# Patient Record
Sex: FEMALE | Race: WHITE | Employment: OTHER | URBAN - METROPOLITAN AREA
[De-identification: names, ages, dates, MRNs, and addresses within clinical notes are randomized per-mention and may not be internally consistent; named-entity substitution may affect disease eponyms.]

---

## 2017-02-28 PROBLEM — Z90.12 H/O TOTAL MASTECTOMY OF LEFT BREAST: Status: ACTIVE | Noted: 2017-02-28

## 2019-08-12 ENCOUNTER — HOSPITAL ENCOUNTER (OUTPATIENT)
Dept: CT IMAGING | Age: 59
Discharge: HOME | End: 2019-08-12
Payer: COMMERCIAL

## 2019-08-12 ENCOUNTER — HOSPITAL ENCOUNTER (OUTPATIENT)
Age: 59
Discharge: HOME | End: 2019-08-12
Payer: COMMERCIAL

## 2019-08-12 VITALS
DIASTOLIC BLOOD PRESSURE: 72 MMHG | OXYGEN SATURATION: 96 % | SYSTOLIC BLOOD PRESSURE: 156 MMHG | RESPIRATION RATE: 20 BRPM | HEART RATE: 70 BPM

## 2019-08-12 DIAGNOSIS — R29.91 MUSCULOSKELETAL ABNORMAL FINDING ON EXAMINATION: ICD-10-CM

## 2019-08-12 DIAGNOSIS — C50.012 CARCINOMA OF NIPPLE AND AREOLA OF FEMALE BREAST, LEFT (HCC): ICD-10-CM

## 2019-08-12 LAB
INR BLD: 0.98 (ref 0.86–1.14)
PLATELET # BLD: 154 K/UL (ref 135–450)
PROTHROMBIN TIME: 11.2 SEC (ref 9.8–13)

## 2019-08-12 PROCEDURE — 88342 IMHCHEM/IMCYTCHM 1ST ANTB: CPT

## 2019-08-12 PROCEDURE — 88311 DECALCIFY TISSUE: CPT

## 2019-08-12 PROCEDURE — 85049 AUTOMATED PLATELET COUNT: CPT

## 2019-08-12 PROCEDURE — 6360000002 HC RX W HCPCS: Performed by: RADIOLOGY

## 2019-08-12 PROCEDURE — 2709999900 CT BIOPSY DEEP BONE PERCUTANEOUS

## 2019-08-12 PROCEDURE — 88307 TISSUE EXAM BY PATHOLOGIST: CPT

## 2019-08-12 PROCEDURE — 77012 CT SCAN FOR NEEDLE BIOPSY: CPT

## 2019-08-12 PROCEDURE — 85610 PROTHROMBIN TIME: CPT

## 2019-08-12 PROCEDURE — 88360 TUMOR IMMUNOHISTOCHEM/MANUAL: CPT

## 2019-08-12 PROCEDURE — 88341 IMHCHEM/IMCYTCHM EA ADD ANTB: CPT

## 2019-08-12 PROCEDURE — 36415 COLL VENOUS BLD VENIPUNCTURE: CPT

## 2019-08-12 RX ORDER — MIDAZOLAM HYDROCHLORIDE 5 MG/ML
INJECTION INTRAMUSCULAR; INTRAVENOUS
Status: COMPLETED | OUTPATIENT
Start: 2019-08-12 | End: 2019-08-12

## 2019-08-12 RX ORDER — FENTANYL CITRATE 50 UG/ML
INJECTION, SOLUTION INTRAMUSCULAR; INTRAVENOUS
Status: COMPLETED | OUTPATIENT
Start: 2019-08-12 | End: 2019-08-12

## 2019-08-12 RX ADMIN — MIDAZOLAM HYDROCHLORIDE 1 MG: 5 INJECTION, SOLUTION INTRAMUSCULAR; INTRAVENOUS at 09:15

## 2019-08-12 RX ADMIN — FENTANYL CITRATE 50 MCG: 50 INJECTION INTRAMUSCULAR; INTRAVENOUS at 09:15

## 2019-08-12 RX ADMIN — MIDAZOLAM HYDROCHLORIDE 1 MG: 5 INJECTION, SOLUTION INTRAMUSCULAR; INTRAVENOUS at 09:28

## 2022-01-31 ENCOUNTER — HOSPITAL ENCOUNTER (OUTPATIENT)
Dept: CT IMAGING | Age: 62
Discharge: HOME | End: 2022-01-31
Payer: COMMERCIAL

## 2022-01-31 DIAGNOSIS — C50.012 MALIGNANT NEOPLASM OF NIPPLE OF LEFT BREAST IN FEMALE, UNSPECIFIED ESTROGEN RECEPTOR STATUS (HCC): ICD-10-CM

## 2022-01-31 PROCEDURE — 74177 CT ABD & PELVIS W/CONTRAST: CPT

## 2022-01-31 PROCEDURE — 6360000004 HC RX CONTRAST MEDICATION: Performed by: NURSE PRACTITIONER

## 2022-01-31 RX ADMIN — IOPAMIDOL 75 ML: 755 INJECTION, SOLUTION INTRAVENOUS at 12:08

## 2022-01-31 RX ADMIN — IOHEXOL 50 ML: 240 INJECTION, SOLUTION INTRATHECAL; INTRAVASCULAR; INTRAVENOUS; ORAL at 12:23

## 2022-05-23 ENCOUNTER — PRE-ADMISSION TESTING (OUTPATIENT)
Dept: PREADMISSION TESTING | Facility: HOSPITAL | Age: 62
End: 2022-05-23

## 2022-05-23 ENCOUNTER — HOSPITAL ENCOUNTER (OUTPATIENT)
Dept: GENERAL RADIOLOGY | Facility: HOSPITAL | Age: 62
Discharge: HOME | End: 2022-05-23

## 2022-05-23 VITALS — BODY MASS INDEX: 29.17 KG/M2 | WEIGHT: 185.85 LBS | HEIGHT: 67 IN

## 2022-05-23 LAB
ANION GAP SERPL CALCULATED.3IONS-SCNC: 11 MMOL/L (ref 5–15)
BUN SERPL-MCNC: 14 MG/DL (ref 8–23)
BUN/CREAT SERPL: 13.5 (ref 7–25)
CALCIUM SPEC-SCNC: 8.5 MG/DL (ref 8.6–10.5)
CHLORIDE SERPL-SCNC: 104 MMOL/L (ref 98–107)
CO2 SERPL-SCNC: 23 MMOL/L (ref 22–29)
CREAT SERPL-MCNC: 1.04 MG/DL (ref 0.57–1)
DEPRECATED RDW RBC AUTO: 49.7 FL (ref 37–54)
EGFRCR SERPLBLD CKD-EPI 2021: 61.7 ML/MIN/1.73
ERYTHROCYTE [DISTWIDTH] IN BLOOD BY AUTOMATED COUNT: 13 % (ref 12.3–15.4)
GLUCOSE SERPL-MCNC: 102 MG/DL (ref 65–99)
HBA1C MFR BLD: 4.5 % (ref 4.8–5.6)
HCT VFR BLD AUTO: 31 % (ref 34–46.6)
HGB BLD-MCNC: 11.1 G/DL (ref 12–15.9)
MCH RBC QN AUTO: 38.1 PG (ref 26.6–33)
MCHC RBC AUTO-ENTMCNC: 35.8 G/DL (ref 31.5–35.7)
MCV RBC AUTO: 106.5 FL (ref 79–97)
PLATELET # BLD AUTO: 134 10*3/MM3 (ref 140–450)
PMV BLD AUTO: 10 FL (ref 6–12)
POTASSIUM SERPL-SCNC: 4.2 MMOL/L (ref 3.5–5.2)
RBC # BLD AUTO: 2.91 10*6/MM3 (ref 3.77–5.28)
SARS-COV-2 RNA PNL SPEC NAA+PROBE: NOT DETECTED
SODIUM SERPL-SCNC: 138 MMOL/L (ref 136–145)
WBC NRBC COR # BLD: 2.4 10*3/MM3 (ref 3.4–10.8)

## 2022-05-23 PROCEDURE — 85027 COMPLETE CBC AUTOMATED: CPT

## 2022-05-23 PROCEDURE — U0004 COV-19 TEST NON-CDC HGH THRU: HCPCS

## 2022-05-23 PROCEDURE — C9803 HOPD COVID-19 SPEC COLLECT: HCPCS

## 2022-05-23 PROCEDURE — 71046 X-RAY EXAM CHEST 2 VIEWS: CPT

## 2022-05-23 PROCEDURE — 80048 BASIC METABOLIC PNL TOTAL CA: CPT

## 2022-05-23 PROCEDURE — 83036 HEMOGLOBIN GLYCOSYLATED A1C: CPT

## 2022-05-23 PROCEDURE — 36415 COLL VENOUS BLD VENIPUNCTURE: CPT

## 2022-05-23 RX ORDER — CITALOPRAM 40 MG/1
40 TABLET ORAL DAILY
COMMUNITY

## 2022-05-23 RX ORDER — LETROZOLE 2.5 MG/1
2.5 TABLET, FILM COATED ORAL DAILY
COMMUNITY

## 2022-05-23 RX ORDER — METOPROLOL SUCCINATE 100 MG/1
100 TABLET, EXTENDED RELEASE ORAL DAILY
COMMUNITY

## 2022-05-23 RX ORDER — LOSARTAN POTASSIUM 50 MG/1
50 TABLET ORAL DAILY
COMMUNITY

## 2022-05-23 RX ORDER — CHOLECALCIFEROL (VITAMIN D3) 125 MCG
1 CAPSULE ORAL DAILY
COMMUNITY

## 2022-05-25 ENCOUNTER — ANESTHESIA EVENT (OUTPATIENT)
Dept: PERIOP | Facility: HOSPITAL | Age: 62
End: 2022-05-25

## 2022-05-25 ENCOUNTER — ANESTHESIA (OUTPATIENT)
Dept: PERIOP | Facility: HOSPITAL | Age: 62
End: 2022-05-25

## 2022-05-25 ENCOUNTER — ANESTHESIA EVENT CONVERTED (OUTPATIENT)
Dept: ANESTHESIOLOGY | Facility: HOSPITAL | Age: 62
End: 2022-05-25

## 2022-05-25 ENCOUNTER — APPOINTMENT (OUTPATIENT)
Dept: GENERAL RADIOLOGY | Facility: HOSPITAL | Age: 62
End: 2022-05-25

## 2022-05-25 ENCOUNTER — HOSPITAL ENCOUNTER (OUTPATIENT)
Facility: HOSPITAL | Age: 62
Discharge: HOME | End: 2022-05-26
Attending: ORTHOPAEDIC SURGERY | Admitting: ORTHOPAEDIC SURGERY

## 2022-05-25 DIAGNOSIS — Z85.3 HISTORY OF BREAST CANCER: ICD-10-CM

## 2022-05-25 PROBLEM — S42.402A CLOSED FRACTURE OF LEFT DISTAL HUMERUS: Status: ACTIVE | Noted: 2022-05-25

## 2022-05-25 PROCEDURE — 73070 X-RAY EXAM OF ELBOW: CPT

## 2022-05-25 PROCEDURE — 25010000002 VANCOMYCIN 1 G RECONSTITUTED SOLUTION: Performed by: ORTHOPAEDIC SURGERY

## 2022-05-25 PROCEDURE — 88311 DECALCIFY TISSUE: CPT | Performed by: ORTHOPAEDIC SURGERY

## 2022-05-25 PROCEDURE — 24363 REPLACE ELBOW JOINT: CPT | Performed by: PHYSICIAN ASSISTANT

## 2022-05-25 PROCEDURE — 88342 IMHCHEM/IMCYTCHM 1ST ANTB: CPT | Performed by: ORTHOPAEDIC SURGERY

## 2022-05-25 PROCEDURE — 88307 TISSUE EXAM BY PATHOLOGIST: CPT | Performed by: ORTHOPAEDIC SURGERY

## 2022-05-25 PROCEDURE — 25010000002 ROPIVACAINE PER 1 MG

## 2022-05-25 PROCEDURE — 25010000002 PROPOFOL 10 MG/ML EMULSION: Performed by: NURSE ANESTHETIST, CERTIFIED REGISTERED

## 2022-05-25 PROCEDURE — C1776 JOINT DEVICE (IMPLANTABLE): HCPCS | Performed by: ORTHOPAEDIC SURGERY

## 2022-05-25 PROCEDURE — 25010000002 CEFAZOLIN PER 500 MG: Performed by: ORTHOPAEDIC SURGERY

## 2022-05-25 PROCEDURE — C1713 ANCHOR/SCREW BN/BN,TIS/BN: HCPCS | Performed by: ORTHOPAEDIC SURGERY

## 2022-05-25 PROCEDURE — 25010000002 FENTANYL CITRATE (PF) 50 MCG/ML SOLUTION: Performed by: NURSE ANESTHETIST, CERTIFIED REGISTERED

## 2022-05-25 PROCEDURE — 25010000002 VANCOMYCIN 10 G RECONSTITUTED SOLUTION: Performed by: ORTHOPAEDIC SURGERY

## 2022-05-25 PROCEDURE — 25010000002 ONDANSETRON PER 1 MG: Performed by: NURSE ANESTHETIST, CERTIFIED REGISTERED

## 2022-05-25 PROCEDURE — 76942 ECHO GUIDE FOR BIOPSY: CPT | Performed by: ORTHOPAEDIC SURGERY

## 2022-05-25 PROCEDURE — 25010000002 NEOSTIGMINE 10 MG/10ML SOLUTION: Performed by: NURSE ANESTHETIST, CERTIFIED REGISTERED

## 2022-05-25 PROCEDURE — 25010000002 DEXAMETHASONE PER 1 MG: Performed by: NURSE ANESTHETIST, CERTIFIED REGISTERED

## 2022-05-25 PROCEDURE — 25010000002 FENTANYL CITRATE (PF) 50 MCG/ML SOLUTION

## 2022-05-25 PROCEDURE — 88360 TUMOR IMMUNOHISTOCHEM/MANUAL: CPT | Performed by: ORTHOPAEDIC SURGERY

## 2022-05-25 DEVICE — IMPLANTABLE DEVICE: Type: IMPLANTABLE DEVICE | Site: ELBOW | Status: FUNCTIONAL

## 2022-05-25 DEVICE — SMARTSET HIGH PERFORMANCE MV MEDIUM VISCOSITY BONE CEMENT 40G
Type: IMPLANTABLE DEVICE | Site: ELBOW | Status: FUNCTIONAL
Brand: SMARTSET

## 2022-05-25 RX ORDER — MIDAZOLAM HYDROCHLORIDE 1 MG/ML
1 INJECTION INTRAMUSCULAR; INTRAVENOUS
Status: DISCONTINUED | OUTPATIENT
Start: 2022-05-25 | End: 2022-05-25 | Stop reason: HOSPADM

## 2022-05-25 RX ORDER — ROCURONIUM BROMIDE 10 MG/ML
INJECTION, SOLUTION INTRAVENOUS AS NEEDED
Status: DISCONTINUED | OUTPATIENT
Start: 2022-05-25 | End: 2022-05-25 | Stop reason: SURG

## 2022-05-25 RX ORDER — ACETAMINOPHEN 500 MG
1000 TABLET ORAL ONCE
Status: COMPLETED | OUTPATIENT
Start: 2022-05-25 | End: 2022-05-25

## 2022-05-25 RX ORDER — FAMOTIDINE 20 MG/1
20 TABLET, FILM COATED ORAL ONCE
Status: COMPLETED | OUTPATIENT
Start: 2022-05-25 | End: 2022-05-25

## 2022-05-25 RX ORDER — ACETAMINOPHEN 500 MG
1000 TABLET ORAL ONCE
Status: DISCONTINUED | OUTPATIENT
Start: 2022-05-25 | End: 2022-05-25 | Stop reason: HOSPADM

## 2022-05-25 RX ORDER — SODIUM CHLORIDE 0.9 % (FLUSH) 0.9 %
10 SYRINGE (ML) INJECTION AS NEEDED
Status: CANCELLED | OUTPATIENT
Start: 2022-05-25

## 2022-05-25 RX ORDER — SODIUM CHLORIDE 0.9 % (FLUSH) 0.9 %
10 SYRINGE (ML) INJECTION EVERY 12 HOURS SCHEDULED
Status: CANCELLED | OUTPATIENT
Start: 2022-05-25

## 2022-05-25 RX ORDER — ROPIVACAINE HYDROCHLORIDE 2 MG/ML
6 INJECTION, SOLUTION EPIDURAL; INFILTRATION CONTINUOUS
Status: DISCONTINUED | OUTPATIENT
Start: 2022-05-25 | End: 2022-05-26

## 2022-05-25 RX ORDER — DEXAMETHASONE SODIUM PHOSPHATE 4 MG/ML
INJECTION, SOLUTION INTRA-ARTICULAR; INTRALESIONAL; INTRAMUSCULAR; INTRAVENOUS; SOFT TISSUE AS NEEDED
Status: DISCONTINUED | OUTPATIENT
Start: 2022-05-25 | End: 2022-05-25 | Stop reason: SURG

## 2022-05-25 RX ORDER — ONDANSETRON 4 MG/1
4 TABLET, FILM COATED ORAL EVERY 6 HOURS PRN
Status: DISCONTINUED | OUTPATIENT
Start: 2022-05-25 | End: 2022-05-26 | Stop reason: HOSPADM

## 2022-05-25 RX ORDER — ONDANSETRON 2 MG/ML
4 INJECTION INTRAMUSCULAR; INTRAVENOUS ONCE AS NEEDED
Status: DISCONTINUED | OUTPATIENT
Start: 2022-05-25 | End: 2022-05-25 | Stop reason: HOSPADM

## 2022-05-25 RX ORDER — TRANEXAMIC ACID 10 MG/ML
1000 INJECTION, SOLUTION INTRAVENOUS ONCE
Status: DISCONTINUED | OUTPATIENT
Start: 2022-05-25 | End: 2022-05-25 | Stop reason: HOSPADM

## 2022-05-25 RX ORDER — GLYCOPYRROLATE 0.2 MG/ML
INJECTION INTRAMUSCULAR; INTRAVENOUS AS NEEDED
Status: DISCONTINUED | OUTPATIENT
Start: 2022-05-25 | End: 2022-05-25 | Stop reason: SURG

## 2022-05-25 RX ORDER — HYDROMORPHONE HYDROCHLORIDE 1 MG/ML
0.5 INJECTION, SOLUTION INTRAMUSCULAR; INTRAVENOUS; SUBCUTANEOUS
Status: DISCONTINUED | OUTPATIENT
Start: 2022-05-25 | End: 2022-05-25 | Stop reason: HOSPADM

## 2022-05-25 RX ORDER — FENTANYL CITRATE 50 UG/ML
INJECTION, SOLUTION INTRAMUSCULAR; INTRAVENOUS
Status: COMPLETED
Start: 2022-05-25 | End: 2022-05-25

## 2022-05-25 RX ORDER — ONDANSETRON 2 MG/ML
INJECTION INTRAMUSCULAR; INTRAVENOUS AS NEEDED
Status: DISCONTINUED | OUTPATIENT
Start: 2022-05-25 | End: 2022-05-25 | Stop reason: SURG

## 2022-05-25 RX ORDER — HYDRALAZINE HYDROCHLORIDE 20 MG/ML
5 INJECTION INTRAMUSCULAR; INTRAVENOUS
Status: DISCONTINUED | OUTPATIENT
Start: 2022-05-25 | End: 2022-05-25 | Stop reason: HOSPADM

## 2022-05-25 RX ORDER — FENTANYL CITRATE 50 UG/ML
50 INJECTION, SOLUTION INTRAMUSCULAR; INTRAVENOUS
Status: DISCONTINUED | OUTPATIENT
Start: 2022-05-25 | End: 2022-05-25 | Stop reason: HOSPADM

## 2022-05-25 RX ORDER — EPHEDRINE SULFATE 50 MG/ML
INJECTION, SOLUTION INTRAVENOUS AS NEEDED
Status: DISCONTINUED | OUTPATIENT
Start: 2022-05-25 | End: 2022-05-25 | Stop reason: SURG

## 2022-05-25 RX ORDER — SODIUM CHLORIDE, SODIUM LACTATE, POTASSIUM CHLORIDE, CALCIUM CHLORIDE 600; 310; 30; 20 MG/100ML; MG/100ML; MG/100ML; MG/100ML
9 INJECTION, SOLUTION INTRAVENOUS CONTINUOUS
Status: DISCONTINUED | OUTPATIENT
Start: 2022-05-25 | End: 2022-05-26 | Stop reason: HOSPADM

## 2022-05-25 RX ORDER — VANCOMYCIN HYDROCHLORIDE 1 G/20ML
INJECTION, POWDER, LYOPHILIZED, FOR SOLUTION INTRAVENOUS AS NEEDED
Status: DISCONTINUED | OUTPATIENT
Start: 2022-05-25 | End: 2022-05-25 | Stop reason: HOSPADM

## 2022-05-25 RX ORDER — HYDROMORPHONE HYDROCHLORIDE 1 MG/ML
0.5 INJECTION, SOLUTION INTRAMUSCULAR; INTRAVENOUS; SUBCUTANEOUS
Status: DISCONTINUED | OUTPATIENT
Start: 2022-05-25 | End: 2022-05-26 | Stop reason: HOSPADM

## 2022-05-25 RX ORDER — LABETALOL HYDROCHLORIDE 5 MG/ML
5 INJECTION, SOLUTION INTRAVENOUS
Status: DISCONTINUED | OUTPATIENT
Start: 2022-05-25 | End: 2022-05-25 | Stop reason: HOSPADM

## 2022-05-25 RX ORDER — FAMOTIDINE 10 MG/ML
20 INJECTION, SOLUTION INTRAVENOUS ONCE
Status: CANCELLED | OUTPATIENT
Start: 2022-05-25 | End: 2022-05-25

## 2022-05-25 RX ORDER — BUPIVACAINE HYDROCHLORIDE 2.5 MG/ML
INJECTION, SOLUTION EPIDURAL; INFILTRATION; INTRACAUDAL
Status: COMPLETED | OUTPATIENT
Start: 2022-05-25 | End: 2022-05-25

## 2022-05-25 RX ORDER — ROPIVACAINE HYDROCHLORIDE 2 MG/ML
INJECTION, SOLUTION EPIDURAL; INFILTRATION; PERINEURAL
Status: COMPLETED
Start: 2022-05-25 | End: 2022-05-25

## 2022-05-25 RX ORDER — LIDOCAINE HYDROCHLORIDE 10 MG/ML
0.5 INJECTION, SOLUTION EPIDURAL; INFILTRATION; INTRACAUDAL; PERINEURAL ONCE AS NEEDED
Status: COMPLETED | OUTPATIENT
Start: 2022-05-25 | End: 2022-05-25

## 2022-05-25 RX ORDER — ONDANSETRON 2 MG/ML
4 INJECTION INTRAMUSCULAR; INTRAVENOUS EVERY 6 HOURS PRN
Status: DISCONTINUED | OUTPATIENT
Start: 2022-05-25 | End: 2022-05-26 | Stop reason: HOSPADM

## 2022-05-25 RX ORDER — NEOSTIGMINE METHYLSULFATE 1 MG/ML
INJECTION, SOLUTION INTRAVENOUS AS NEEDED
Status: DISCONTINUED | OUTPATIENT
Start: 2022-05-25 | End: 2022-05-25 | Stop reason: SURG

## 2022-05-25 RX ORDER — CEFAZOLIN SODIUM IN 0.9 % NACL 2 G/100 ML
2 PLASTIC BAG, INJECTION (ML) INTRAVENOUS ONCE
Status: COMPLETED | OUTPATIENT
Start: 2022-05-25 | End: 2022-05-25

## 2022-05-25 RX ORDER — NALOXONE HCL 0.4 MG/ML
0.1 VIAL (ML) INJECTION
Status: DISCONTINUED | OUTPATIENT
Start: 2022-05-25 | End: 2022-05-26 | Stop reason: HOSPADM

## 2022-05-25 RX ORDER — LIDOCAINE HYDROCHLORIDE 10 MG/ML
INJECTION, SOLUTION EPIDURAL; INFILTRATION; INTRACAUDAL; PERINEURAL AS NEEDED
Status: DISCONTINUED | OUTPATIENT
Start: 2022-05-25 | End: 2022-05-25 | Stop reason: SURG

## 2022-05-25 RX ORDER — PROPOFOL 10 MG/ML
VIAL (ML) INTRAVENOUS AS NEEDED
Status: DISCONTINUED | OUTPATIENT
Start: 2022-05-25 | End: 2022-05-25 | Stop reason: SURG

## 2022-05-25 RX ORDER — OXYCODONE HYDROCHLORIDE 5 MG/1
5 TABLET ORAL EVERY 4 HOURS PRN
Status: DISCONTINUED | OUTPATIENT
Start: 2022-05-25 | End: 2022-05-26 | Stop reason: HOSPADM

## 2022-05-25 RX ORDER — TRANEXAMIC ACID 10 MG/ML
1000 INJECTION, SOLUTION INTRAVENOUS ONCE
Status: COMPLETED | OUTPATIENT
Start: 2022-05-25 | End: 2022-05-25

## 2022-05-25 RX ORDER — ALBUTEROL SULFATE 2.5 MG/3ML
2.5 SOLUTION RESPIRATORY (INHALATION) ONCE AS NEEDED
Status: DISCONTINUED | OUTPATIENT
Start: 2022-05-25 | End: 2022-05-25 | Stop reason: HOSPADM

## 2022-05-25 RX ORDER — FENTANYL CITRATE 50 UG/ML
INJECTION, SOLUTION INTRAMUSCULAR; INTRAVENOUS
Status: COMPLETED | OUTPATIENT
Start: 2022-05-25 | End: 2022-05-25

## 2022-05-25 RX ADMIN — ONDANSETRON 4 MG: 2 INJECTION INTRAMUSCULAR; INTRAVENOUS at 17:50

## 2022-05-25 RX ADMIN — BUPIVACAINE HYDROCHLORIDE 20 ML: 2.5 INJECTION, SOLUTION EPIDURAL; INFILTRATION; INTRACAUDAL at 12:49

## 2022-05-25 RX ADMIN — LIDOCAINE HYDROCHLORIDE 0.5 ML: 10 INJECTION, SOLUTION EPIDURAL; INFILTRATION; INTRACAUDAL; PERINEURAL at 12:26

## 2022-05-25 RX ADMIN — Medication 1250 MG: at 16:20

## 2022-05-25 RX ADMIN — PROPOFOL 20 MCG/KG/MIN: 10 INJECTION, EMULSION INTRAVENOUS at 17:14

## 2022-05-25 RX ADMIN — FENTANYL CITRATE 50 MCG: 50 INJECTION, SOLUTION INTRAMUSCULAR; INTRAVENOUS at 19:00

## 2022-05-25 RX ADMIN — FAMOTIDINE 20 MG: 20 TABLET ORAL at 12:26

## 2022-05-25 RX ADMIN — SODIUM CHLORIDE, POTASSIUM CHLORIDE, SODIUM LACTATE AND CALCIUM CHLORIDE 9 ML/HR: 600; 310; 30; 20 INJECTION, SOLUTION INTRAVENOUS at 12:26

## 2022-05-25 RX ADMIN — LIDOCAINE HYDROCHLORIDE 50 MG: 10 INJECTION, SOLUTION EPIDURAL; INFILTRATION; INTRACAUDAL; PERINEURAL at 16:09

## 2022-05-25 RX ADMIN — TRANEXAMIC ACID 1000 MG: 10 INJECTION, SOLUTION INTRAVENOUS at 17:39

## 2022-05-25 RX ADMIN — FENTANYL CITRATE 50 MCG: 50 INJECTION, SOLUTION INTRAMUSCULAR; INTRAVENOUS at 18:37

## 2022-05-25 RX ADMIN — EPHEDRINE SULFATE 10 MG: 50 INJECTION, SOLUTION INTRAVENOUS at 16:58

## 2022-05-25 RX ADMIN — FENTANYL CITRATE 100 MCG: 50 INJECTION, SOLUTION INTRAMUSCULAR; INTRAVENOUS at 12:49

## 2022-05-25 RX ADMIN — GLYCOPYRROLATE 0.8 MG: 0.2 INJECTION INTRAMUSCULAR; INTRAVENOUS at 17:55

## 2022-05-25 RX ADMIN — ACETAMINOPHEN 1000 MG: 500 TABLET ORAL at 12:26

## 2022-05-25 RX ADMIN — SODIUM CHLORIDE, POTASSIUM CHLORIDE, SODIUM LACTATE AND CALCIUM CHLORIDE: 600; 310; 30; 20 INJECTION, SOLUTION INTRAVENOUS at 17:54

## 2022-05-25 RX ADMIN — PROPOFOL 200 MG: 10 INJECTION, EMULSION INTRAVENOUS at 16:09

## 2022-05-25 RX ADMIN — ROPIVACAINE HYDROCHLORIDE 6 ML/HR: 2 INJECTION, SOLUTION EPIDURAL; INFILTRATION at 18:50

## 2022-05-25 RX ADMIN — ROCURONIUM BROMIDE 50 MG: 10 INJECTION INTRAVENOUS at 16:09

## 2022-05-25 RX ADMIN — NEOSTIGMINE METHYLSULFATE 4 MG: 0.5 INJECTION INTRAVENOUS at 17:55

## 2022-05-25 RX ADMIN — TRANEXAMIC ACID 1000 MG: 10 INJECTION, SOLUTION INTRAVENOUS at 16:20

## 2022-05-25 RX ADMIN — DEXAMETHASONE SODIUM PHOSPHATE 8 MG: 4 INJECTION INTRA-ARTICULAR; INTRALESIONAL; INTRAMUSCULAR; INTRAVENOUS; SOFT TISSUE at 17:10

## 2022-05-25 RX ADMIN — CEFAZOLIN 2 G: 10 INJECTION, POWDER, FOR SOLUTION INTRAVENOUS at 15:56

## 2022-05-26 VITALS
RESPIRATION RATE: 16 BRPM | BODY MASS INDEX: 29.03 KG/M2 | HEIGHT: 67 IN | HEART RATE: 83 BPM | OXYGEN SATURATION: 97 % | DIASTOLIC BLOOD PRESSURE: 68 MMHG | SYSTOLIC BLOOD PRESSURE: 134 MMHG | TEMPERATURE: 98 F | WEIGHT: 185 LBS

## 2022-05-26 PROBLEM — F39 MOOD DISORDER: Status: ACTIVE | Noted: 2022-05-26

## 2022-05-26 PROBLEM — R79.89 ELEVATED SERUM CREATININE: Status: ACTIVE | Noted: 2022-05-26

## 2022-05-26 PROBLEM — C50.919 BREAST CANCER: Status: ACTIVE | Noted: 2022-05-26

## 2022-05-26 PROBLEM — K21.9 GERD (GASTROESOPHAGEAL REFLUX DISEASE): Status: ACTIVE | Noted: 2022-05-26

## 2022-05-26 PROBLEM — I10 ESSENTIAL HYPERTENSION: Status: ACTIVE | Noted: 2022-05-26

## 2022-05-26 PROBLEM — M81.0 OSTEOPOROSIS: Status: ACTIVE | Noted: 2022-05-26

## 2022-05-26 PROBLEM — D61.818 PANCYTOPENIA: Status: ACTIVE | Noted: 2022-05-26

## 2022-05-26 LAB
ANION GAP SERPL CALCULATED.3IONS-SCNC: 14 MMOL/L (ref 5–15)
BASOPHILS # BLD AUTO: 0 10*3/MM3 (ref 0–0.2)
BASOPHILS NFR BLD AUTO: 0 % (ref 0–1.5)
BUN SERPL-MCNC: 10 MG/DL (ref 8–23)
BUN/CREAT SERPL: 10 (ref 7–25)
CA-I SERPL ISE-MCNC: 1.2 MMOL/L (ref 1.12–1.32)
CALCIUM SPEC-SCNC: 8.3 MG/DL (ref 8.6–10.5)
CHLORIDE SERPL-SCNC: 103 MMOL/L (ref 98–107)
CO2 SERPL-SCNC: 20 MMOL/L (ref 22–29)
CREAT SERPL-MCNC: 1 MG/DL (ref 0.57–1)
CREAT UR-MCNC: 72.6 MG/DL
DEPRECATED RDW RBC AUTO: 49.5 FL (ref 37–54)
EGFRCR SERPLBLD CKD-EPI 2021: 64.6 ML/MIN/1.73
EOSINOPHIL # BLD AUTO: 0 10*3/MM3 (ref 0–0.4)
EOSINOPHIL NFR BLD AUTO: 0 % (ref 0.3–6.2)
ERYTHROCYTE [DISTWIDTH] IN BLOOD BY AUTOMATED COUNT: 12.9 % (ref 12.3–15.4)
GLUCOSE SERPL-MCNC: 140 MG/DL (ref 65–99)
HCT VFR BLD AUTO: 26.9 % (ref 34–46.6)
HGB BLD-MCNC: 9.7 G/DL (ref 12–15.9)
IMM GRANULOCYTES # BLD AUTO: 0.02 10*3/MM3 (ref 0–0.05)
IMM GRANULOCYTES NFR BLD AUTO: 0.9 % (ref 0–0.5)
LYMPHOCYTES # BLD AUTO: 0.27 10*3/MM3 (ref 0.7–3.1)
LYMPHOCYTES NFR BLD AUTO: 11.5 % (ref 19.6–45.3)
MCH RBC QN AUTO: 38.2 PG (ref 26.6–33)
MCHC RBC AUTO-ENTMCNC: 36.1 G/DL (ref 31.5–35.7)
MCV RBC AUTO: 105.9 FL (ref 79–97)
MONOCYTES # BLD AUTO: 0.1 10*3/MM3 (ref 0.1–0.9)
MONOCYTES NFR BLD AUTO: 4.3 % (ref 5–12)
NEUTROPHILS NFR BLD AUTO: 1.96 10*3/MM3 (ref 1.7–7)
NEUTROPHILS NFR BLD AUTO: 83.3 % (ref 42.7–76)
NRBC BLD AUTO-RTO: 0 /100 WBC (ref 0–0.2)
PLATELET # BLD AUTO: 88 10*3/MM3 (ref 140–450)
PMV BLD AUTO: 9.5 FL (ref 6–12)
POTASSIUM SERPL-SCNC: 4 MMOL/L (ref 3.5–5.2)
RBC # BLD AUTO: 2.54 10*6/MM3 (ref 3.77–5.28)
SODIUM SERPL-SCNC: 137 MMOL/L (ref 136–145)
WBC NRBC COR # BLD: 2.35 10*3/MM3 (ref 3.4–10.8)

## 2022-05-26 PROCEDURE — 97530 THERAPEUTIC ACTIVITIES: CPT | Performed by: OCCUPATIONAL THERAPIST

## 2022-05-26 PROCEDURE — 97535 SELF CARE MNGMENT TRAINING: CPT | Performed by: OCCUPATIONAL THERAPIST

## 2022-05-26 PROCEDURE — 63710000001 OXYCODONE 5 MG TABLET: Performed by: ORTHOPAEDIC SURGERY

## 2022-05-26 PROCEDURE — 25010000002 VANCOMYCIN 10 G RECONSTITUTED SOLUTION: Performed by: ORTHOPAEDIC SURGERY

## 2022-05-26 PROCEDURE — A9270 NON-COVERED ITEM OR SERVICE: HCPCS | Performed by: NURSE PRACTITIONER

## 2022-05-26 PROCEDURE — A9270 NON-COVERED ITEM OR SERVICE: HCPCS | Performed by: ORTHOPAEDIC SURGERY

## 2022-05-26 PROCEDURE — 63710000001 LETROZOLE 2.5 MG TABLET: Performed by: NURSE PRACTITIONER

## 2022-05-26 PROCEDURE — 80048 BASIC METABOLIC PNL TOTAL CA: CPT | Performed by: ORTHOPAEDIC SURGERY

## 2022-05-26 PROCEDURE — 85025 COMPLETE CBC W/AUTO DIFF WBC: CPT | Performed by: ORTHOPAEDIC SURGERY

## 2022-05-26 PROCEDURE — 82330 ASSAY OF CALCIUM: CPT | Performed by: NURSE PRACTITIONER

## 2022-05-26 PROCEDURE — 25010000002 ROPIVACAINE PER 1 MG: Performed by: NURSE ANESTHETIST, CERTIFIED REGISTERED

## 2022-05-26 PROCEDURE — 63710000001 METOPROLOL SUCCINATE XL 100 MG TABLET SUSTAINED-RELEASE 24 HOUR: Performed by: NURSE PRACTITIONER

## 2022-05-26 PROCEDURE — 63710000001 CITALOPRAM 20 MG TABLET: Performed by: NURSE PRACTITIONER

## 2022-05-26 PROCEDURE — 82570 ASSAY OF URINE CREATININE: CPT | Performed by: NURSE PRACTITIONER

## 2022-05-26 PROCEDURE — 99213 OFFICE O/P EST LOW 20 MIN: CPT | Performed by: INTERNAL MEDICINE

## 2022-05-26 PROCEDURE — 63710000001 LOSARTAN 50 MG TABLET: Performed by: NURSE PRACTITIONER

## 2022-05-26 PROCEDURE — 97165 OT EVAL LOW COMPLEX 30 MIN: CPT | Performed by: OCCUPATIONAL THERAPIST

## 2022-05-26 PROCEDURE — 99204 OFFICE O/P NEW MOD 45 MIN: CPT | Performed by: NURSE PRACTITIONER

## 2022-05-26 PROCEDURE — 86335 IMMUNFIX E-PHORSIS/URINE/CSF: CPT | Performed by: NURSE PRACTITIONER

## 2022-05-26 RX ORDER — LETROZOLE 2.5 MG/1
2.5 TABLET, FILM COATED ORAL DAILY
Status: DISCONTINUED | OUTPATIENT
Start: 2022-05-26 | End: 2022-05-26 | Stop reason: HOSPADM

## 2022-05-26 RX ORDER — METOPROLOL SUCCINATE 100 MG/1
100 TABLET, EXTENDED RELEASE ORAL DAILY
Status: DISCONTINUED | OUTPATIENT
Start: 2022-05-26 | End: 2022-05-26 | Stop reason: HOSPADM

## 2022-05-26 RX ORDER — LOSARTAN POTASSIUM 50 MG/1
50 TABLET ORAL DAILY
Status: DISCONTINUED | OUTPATIENT
Start: 2022-05-26 | End: 2022-05-26 | Stop reason: HOSPADM

## 2022-05-26 RX ORDER — OXYCODONE HYDROCHLORIDE 5 MG/1
5 TABLET ORAL EVERY 4 HOURS PRN
Qty: 25 TABLET | Refills: 0 | Status: SHIPPED | OUTPATIENT
Start: 2022-05-26 | End: 2022-06-01

## 2022-05-26 RX ORDER — CITALOPRAM 20 MG/1
40 TABLET ORAL DAILY
Status: DISCONTINUED | OUTPATIENT
Start: 2022-05-26 | End: 2022-05-26 | Stop reason: HOSPADM

## 2022-05-26 RX ADMIN — VANCOMYCIN HYDROCHLORIDE 1250 MG: 10 INJECTION, POWDER, LYOPHILIZED, FOR SOLUTION INTRAVENOUS at 05:13

## 2022-05-26 RX ADMIN — METOPROLOL SUCCINATE 100 MG: 100 TABLET, EXTENDED RELEASE ORAL at 08:23

## 2022-05-26 RX ADMIN — LETROZOLE 2.5 MG: 2.5 TABLET ORAL at 08:23

## 2022-05-26 RX ADMIN — OXYCODONE 5 MG: 5 TABLET ORAL at 05:13

## 2022-05-26 RX ADMIN — LOSARTAN POTASSIUM 50 MG: 50 TABLET, FILM COATED ORAL at 08:23

## 2022-05-26 RX ADMIN — CITALOPRAM HYDROBROMIDE 40 MG: 20 TABLET ORAL at 08:23

## 2022-05-26 RX ADMIN — ROPIVACAINE HYDROCHLORIDE 6 ML/HR: 5 INJECTION, SOLUTION EPIDURAL; INFILTRATION; PERINEURAL at 11:21

## 2022-05-26 RX ADMIN — OXYCODONE 5 MG: 5 TABLET ORAL at 11:35

## 2022-05-31 LAB — INTERPRETATION UR IFE-IMP: NORMAL

## 2022-06-02 LAB
CYTO UR: NORMAL
LAB AP CASE REPORT: NORMAL
LAB AP CLINICAL INFORMATION: NORMAL
LAB AP DIAGNOSIS COMMENT: NORMAL
LAB AP SPECIAL STAINS: NORMAL
PATH REPORT.FINAL DX SPEC: NORMAL
PATH REPORT.GROSS SPEC: NORMAL

## 2022-08-30 ENCOUNTER — HOSPITAL ENCOUNTER (OUTPATIENT)
Dept: NUCLEAR MEDICINE | Age: 62
Discharge: HOME | End: 2022-08-30
Payer: COMMERCIAL

## 2022-08-30 DIAGNOSIS — C50.012 MALIGNANT NEOPLASM OF NIPPLE OR AREOLA OF FEMALE BREAST, LEFT (HCC): ICD-10-CM

## 2022-08-30 PROCEDURE — 78306 BONE IMAGING WHOLE BODY: CPT | Performed by: INTERNAL MEDICINE

## 2022-08-30 PROCEDURE — 3430000000 HC RX DIAGNOSTIC RADIOPHARMACEUTICAL: Performed by: INTERNAL MEDICINE

## 2022-08-30 PROCEDURE — A9503 TC99M MEDRONATE: HCPCS | Performed by: INTERNAL MEDICINE

## 2022-08-30 RX ORDER — TC 99M MEDRONATE 20 MG/10ML
26.1 INJECTION, POWDER, LYOPHILIZED, FOR SOLUTION INTRAVENOUS
Status: COMPLETED | OUTPATIENT
Start: 2022-08-30 | End: 2022-08-30

## 2022-08-30 RX ADMIN — TC 99M MEDRONATE 26.1 MILLICURIE: 20 INJECTION, POWDER, LYOPHILIZED, FOR SOLUTION INTRAVENOUS at 10:36

## 2023-01-17 ENCOUNTER — HOSPITAL ENCOUNTER (OUTPATIENT)
Dept: NUCLEAR MEDICINE | Age: 63
Discharge: HOME | End: 2023-01-17

## 2023-01-17 ENCOUNTER — HOSPITAL ENCOUNTER (OUTPATIENT)
Dept: NUCLEAR MEDICINE | Age: 63
Discharge: HOME | End: 2023-01-17
Payer: MEDICARE

## 2023-01-17 ENCOUNTER — HOSPITAL ENCOUNTER (OUTPATIENT)
Dept: CT IMAGING | Age: 63
Discharge: HOME | End: 2023-01-17
Payer: MEDICARE

## 2023-01-17 ENCOUNTER — HOSPITAL ENCOUNTER (OUTPATIENT)
Age: 63
Discharge: HOME | End: 2023-01-17
Payer: MEDICARE

## 2023-01-17 DIAGNOSIS — C79.51 BONE METASTASIS (HCC): ICD-10-CM

## 2023-01-17 DIAGNOSIS — C50.012 MALIGNANT NEOPLASM OF NIPPLE AND AREOLA OF FEMALE BREAST, LEFT (HCC): ICD-10-CM

## 2023-01-17 DIAGNOSIS — C50.311 MALIGNANT NEOPLASM OF LOWER-INNER QUADRANT OF RIGHT FEMALE BREAST, UNSPECIFIED ESTROGEN RECEPTOR STATUS (HCC): ICD-10-CM

## 2023-01-17 DIAGNOSIS — C50.012 CARCINOMA OF NIPPLE AND AREOLA OF FEMALE BREAST, LEFT (HCC): ICD-10-CM

## 2023-01-17 DIAGNOSIS — C50.311: ICD-10-CM

## 2023-01-17 LAB
CREAT SERPL-MCNC: 1.6 MG/DL (ref 0.6–1.2)
GFR SERPL CREATININE-BSD FRML MDRD: 36 ML/MIN/{1.73_M2}

## 2023-01-17 PROCEDURE — 82565 ASSAY OF CREATININE: CPT

## 2023-01-17 PROCEDURE — 74176 CT ABD & PELVIS W/O CONTRAST: CPT

## 2023-01-17 PROCEDURE — 36415 COLL VENOUS BLD VENIPUNCTURE: CPT

## 2023-01-17 PROCEDURE — 3430000000 HC RX DIAGNOSTIC RADIOPHARMACEUTICAL: Performed by: NURSE PRACTITIONER

## 2023-01-17 PROCEDURE — 78306 BONE IMAGING WHOLE BODY: CPT | Performed by: NURSE PRACTITIONER

## 2023-01-17 PROCEDURE — A9503 TC99M MEDRONATE: HCPCS | Performed by: NURSE PRACTITIONER

## 2023-01-17 RX ORDER — TC 99M MEDRONATE 20 MG/10ML
27 INJECTION, POWDER, LYOPHILIZED, FOR SOLUTION INTRAVENOUS
Status: COMPLETED | OUTPATIENT
Start: 2023-01-17 | End: 2023-01-17

## 2023-01-17 RX ADMIN — TC 99M MEDRONATE 27 MILLICURIE: 20 INJECTION, POWDER, LYOPHILIZED, FOR SOLUTION INTRAVENOUS at 10:42

## 2023-02-02 ENCOUNTER — HOSPITAL ENCOUNTER (OUTPATIENT)
Dept: OCCUPATIONAL THERAPY | Age: 63
Setting detail: THERAPIES SERIES
Discharge: HOME | End: 2023-02-02
Payer: COMMERCIAL

## 2023-02-02 PROCEDURE — 97165 OT EVAL LOW COMPLEX 30 MIN: CPT

## 2023-02-02 PROCEDURE — 97110 THERAPEUTIC EXERCISES: CPT

## 2023-02-02 PROCEDURE — 97140 MANUAL THERAPY 1/> REGIONS: CPT

## 2023-02-02 NOTE — PROGRESS NOTES
The following patient has been evaluated for occupational therapy services. Please review the attached evaluation and/or summary of the patient's plan of care, and verify that you agree by signing below and sending it back to our office.  Thank you for this referral.    Physician signature_______________________ Date________________    Fax to:   [facility] phone: [phone] Fax: [phone]      LYMPHEDEMA EVALUATION  Evaluation Date:  2/2/2023         Patient Name:  Scarlet Herron       YOB: 1960       Medical Diagnosis:         ICD 10:      Treatment Diagnosis:        Onset Date:      Referral Date:       Referring Physician:         Visits Allowed/Insurance/Certification Information:       Precautions/ Contra-indications:   Latex Allergy:  [x]NO      []YES      SUBJECTIVE FINDINGS      Preferred Language for Healthcare:   [x]English       []other:    Pt Occupation/Job Duties:      Social support/Environment:       Health History reviewed with pt:   []   Yes   []   No        Medical Hx:    Surgeries:     Medications:    C-SSRS Triggered by Intake questionnaire (Past 2 wk assessment):   [x] No, Questionnaire did not trigger screening.   [] Yes, Patient intake triggered further evaluation      [] C-SSRS Screening completed  [] PCP notified via Plan of Care  [] Emergency services notified    History of Present Illness:      Triggering event and start date of swelling/lymphedema:    Chief complaint:     Date of last physical:    Cellulitis: [] Yes [] No [] Current  Notes:_________    Family history of Lymphedema or swelling?:  [] Yes [] No  Notes: _________       Chemotherapy:  [] Yes [] No [] Current  [] Completed Date:_________    Radiation:  [] Yes [] No [] Current  [] Completed Date:_________       Reconstructive Surgery: [] Yes [] No     Axillary Node Dissection: [] Yes [] No  # of Nodes Removed: Left:    / Right:    [place] Node Dissection:  [] Yes [] No # of Nodes Removed: Left:    / Right:    Inguinal Node Dissection [] Yes [] No # of Nodes Removed: Left:    / Right:    Previous treatment for swelling/lymphedema? [] Yes [] No    [] Manual Lymph Drainage (MLD)    [] Compression pump  [] Compression garments  [] Compression bandaging    [] Flexitouch  [] Lymphedema exercise    [] low level laser  Notes:     Does Pt. Currently wear a compression sleeve or stocking:  [] Yes [] No    Does Pt. Exercise regularly: [] Yes [] No       Current Functional Limitations:   []   Yes   []   No  Functional Complaints:      PLOF:   []   No functional limitations   []   Pt with previous functional limitations    Difficulties with any of the following?:  [] Walking   [] Reaching feet and toes  []  Preparing meals  [] Dressing  [] Bathing/showering   [] sleeping in bed  [] Other:     Pt's sleep is affected?    []   Yes   []   No      Patient goal for therapy:  \"****\"    OBJECTIVE FINDINGS:    Pain       Patient describes pain to be   Patient reports pain is  /10 pain at present and  /10 pain at its worst.  Worsened by    Improved by    Vitals: Blood pressure______  O2 sat______  Pulse______    Pitting  {pittin}  Color  {color:48615}  Skin Texture  {skin texture:92184}  Skin Temperature  {skin temperature:42880}  Edema Rebound*:  {Edema rebound:19004}  *pressure applied x10 seconds    Signs of Constriction:  Condition of Nailbeds {condition of nailbeds:10050}     Skin Breakdown (indicate size & number-also note location on body drawings):  Tinea (fungus):  Papilloma-- benign tumor arising from an epithelial layer:  Fibrotic areas:  Warts-- a local growth of the outer layer of skin:  Ulcers:  SCARS:    Definitions:  Papilloma=benign tumor arising from an epithelial layer  Wart=a local growth of the outer layer of skin  Brawny=does not indent    STEMMER SIGN; (positive/negative)    Stage of Lymphedema (Golematis)  Mild:  (less than 2 cm difference  Moderate (2-4 cm difference  Moderate/Severe ( 4-5cm difference  Severe (greater than 5 cm difference)    [] Lymphedema:   [] Lipedema:  [] CVI  [] Cardiac edema      UE Range of motion_____________________________________________________    []  WNL  [] WFL      LE Range of motion_____________________________________________________    []  WNL  [] WFL     GIRTH MEASUREMENT FLOW SHEET    Upper/ Extremity L   Date 2/2        6   Inches above bend of elbow                           34.4         3  Inches above bend of elbow 32.3           Bend of elbow 29.3      3     Inches below bend of elbow 28.5       6    Inches below bend of elbow 22.4   Wrist-Styloids                                   19.5   Distal Palmar Crease 17.5   Thumbs Proximal Phalanx 6.4   Base of 3rd digit 5.9       TOTAL GIRTH           Functional Outcome Measure:       Functional Scale/Score:    UEFI  Date     2/2/2023   Intake Score:     61/80      ASSESSMENT       Impairments:  []   Unable to manage lymphedema  []   Difficulties with ADLs: reaching, grooming  []   Other:    Problems          []   Decreased cervical ROM  []   Decreased UE/LE ROM limited to ***, reducing tolerance for ***  []   Increased pain  []   Decreased flexibility  []   Increased risk for fall due to   []   Standing tolerance *** minutes  []   Increased swelling  []   Poor posture  []   Limited or no knowledge of lymphedema treatment and/or precautions  []   Limited or no knowledge of skin care and infection prevention  []   Patient has no / inadequate compression garment to control lymphedema  []   Other    Rehabilitation Potential:  Good for goals listed below.     Strengths for achieving goals include:  []   Pt motivated   []   PLOF   []   Family support   Limitations for achieving goals include:  []   None   []   Severity of condition     []   Cognitive   []   Lack of family support   []   Lack of resources  []   Co-morbidities     []   Other:         Prognosis:   []   Good   []   Fair   [] Poor    GOALS    Short-Term Goals at ___________ weeks:   []  Patient and/or care giver will understand lymphedema precautions to decrease the risk of infection and acerbation of the lymphedema. []  Patient will develop a tolerance for wearing multi-layer, short-stretch bandages between treatment sessions to facilitate limb decongestion. []  Patient will experience a decrease in pitting edema which will improve tissue health and decrease the risk of infection. []  Body weight will be compared to changes in edema to monitor for appropriate elimination of fluid to reduce the risk of cardiac overload (in cases of severe LE edema). [] Patient will perform HEP with minimal assistance to help improve lymphatic flow and venous return. [] Patient will perform a self-MLD protocol with minimal assistance to help reduce swelling and thus improve ROM and mobility. Long-Term Goals at ____________ weeks:   [] Patient and/or care giver will be independent with short-stretch compression bandaging for continued volume reduction and prevention of re-accumulation of edema fluid. [] Patient will experience increased range of motion and mobility to enable improved transfer (in and out of bed, car, etc.). [] Patient will be independent with donning and doffing of compression garments which will enable regular daily garment wear.    [] Patient will be independent with nighttime compression application/s in order to prevent the re-accumulation of edema fluid. [] Treatment will achieve maximum edema and/or lymphedema reduction to enable functional improvements such as fitting into standard-size clothing and shoes, a return to a prior level of function, improved balance, and reduced risk of falling. [] Patient and/or care giver will be independent with HEP and lymphedema management to help prevent edema relapse and reduce risk of infection.     PLAN OF CARE     To see patient   x/week for   weeks for the following treatment interventions:    []   Therapeutic Exercise  []   Modalities of Choice:   []   Vasopneumatic pump    [] Lymphatouch     []   Manual Therapy  []   Neuromuscular Re-Education  []   Gait Training   []   Therapeutic Activity  []   Lymphedema precautions and prevention  []   Use of compression garment  []   Other     Treatment Performed this visit :   0 minutes     Pt response to Tx:      Plan for Next Visit:           Timed Code Treatment Minutes:     minutes   Total Treatment Time:    minutes    Plan of care sent to provider:      [x]Faxed  []Co-signature    (attempts: 1[x] 2 []3[])         Medicare Cap total YTD:    [x]N/A  Workers Comp Time Stamp  [x]N/A   Time In:   Time Out:    Thank you for the referral of this patient.       Neo Cardenas, OT PT  License #

## 2023-05-26 ENCOUNTER — HOSPITAL ENCOUNTER (OUTPATIENT)
Dept: CT IMAGING | Age: 63
Discharge: HOME | End: 2023-05-26
Payer: COMMERCIAL

## 2023-05-26 ENCOUNTER — HOSPITAL ENCOUNTER (OUTPATIENT)
Dept: NUCLEAR MEDICINE | Age: 63
Discharge: HOME | End: 2023-05-26
Payer: COMMERCIAL

## 2023-05-26 ENCOUNTER — HOSPITAL ENCOUNTER (OUTPATIENT)
Age: 63
Discharge: HOME | End: 2023-05-26
Payer: COMMERCIAL

## 2023-05-26 DIAGNOSIS — C79.51 SECONDARY MALIGNANT NEOPLASM OF BONE AND BONE MARROW (HCC): ICD-10-CM

## 2023-05-26 DIAGNOSIS — C78.02 SECONDARY MALIGNANT NEOPLASM OF LEFT LUNG (HCC): ICD-10-CM

## 2023-05-26 DIAGNOSIS — C78.01 SECONDARY MALIGNANT NEOPLASM OF RIGHT LUNG (HCC): ICD-10-CM

## 2023-05-26 DIAGNOSIS — C50.012 CARCINOMA OF NIPPLE AND AREOLA OF FEMALE BREAST, LEFT (HCC): ICD-10-CM

## 2023-05-26 DIAGNOSIS — C79.52 SECONDARY MALIGNANT NEOPLASM OF BONE AND BONE MARROW (HCC): ICD-10-CM

## 2023-05-26 DIAGNOSIS — C78.7 SECONDARY MALIGNANT NEOPLASM OF LIVER (HCC): ICD-10-CM

## 2023-05-26 LAB
CREAT SERPL-MCNC: 1.5 MG/DL (ref 0.6–1.2)
GFR SERPLBLD CREATININE-BSD FMLA CKD-EPI: 39 ML/MIN/{1.73_M2}

## 2023-05-26 PROCEDURE — 78306 BONE IMAGING WHOLE BODY: CPT

## 2023-05-26 PROCEDURE — 6360000004 HC RX CONTRAST MEDICATION: Performed by: NURSE PRACTITIONER

## 2023-05-26 PROCEDURE — 82565 ASSAY OF CREATININE: CPT

## 2023-05-26 PROCEDURE — 36415 COLL VENOUS BLD VENIPUNCTURE: CPT

## 2023-05-26 PROCEDURE — 70470 CT HEAD/BRAIN W/O & W/DYE: CPT

## 2023-05-26 PROCEDURE — 71260 CT THORAX DX C+: CPT

## 2023-05-26 PROCEDURE — A9503 TC99M MEDRONATE: HCPCS | Performed by: NURSE PRACTITIONER

## 2023-05-26 PROCEDURE — 3430000000 HC RX DIAGNOSTIC RADIOPHARMACEUTICAL: Performed by: NURSE PRACTITIONER

## 2023-05-26 RX ORDER — TC 99M MEDRONATE 20 MG/10ML
26.3 INJECTION, POWDER, LYOPHILIZED, FOR SOLUTION INTRAVENOUS ONCE
Status: COMPLETED | OUTPATIENT
Start: 2023-05-26 | End: 2023-05-26

## 2023-05-26 RX ADMIN — IOPAMIDOL 75 ML: 755 INJECTION, SOLUTION INTRAVENOUS at 09:47

## 2023-05-26 RX ADMIN — TC 99M MEDRONATE 26.3 MILLICURIE: 20 INJECTION, POWDER, LYOPHILIZED, FOR SOLUTION INTRAVENOUS at 09:15

## 2023-10-27 ENCOUNTER — HOSPITAL ENCOUNTER (OUTPATIENT)
Dept: NUCLEAR MEDICINE | Age: 63
Discharge: HOME | End: 2023-10-27
Attending: INTERNAL MEDICINE
Payer: MEDICARE

## 2023-10-27 ENCOUNTER — APPOINTMENT (OUTPATIENT)
Dept: NUCLEAR MEDICINE | Age: 63
End: 2023-10-27
Attending: INTERNAL MEDICINE
Payer: MEDICARE

## 2023-10-27 ENCOUNTER — HOSPITAL ENCOUNTER (OUTPATIENT)
Dept: CT IMAGING | Age: 63
Discharge: HOME | End: 2023-10-27
Attending: INTERNAL MEDICINE
Payer: MEDICARE

## 2023-10-27 DIAGNOSIS — C50.012 CARCINOMA OF NIPPLE AND AREOLA OF FEMALE BREAST, LEFT (HCC): ICD-10-CM

## 2023-10-27 PROCEDURE — 74176 CT ABD & PELVIS W/O CONTRAST: CPT

## 2023-10-27 PROCEDURE — A9503 TC99M MEDRONATE: HCPCS | Performed by: INTERNAL MEDICINE

## 2023-10-27 PROCEDURE — 3430000000 HC RX DIAGNOSTIC RADIOPHARMACEUTICAL: Performed by: INTERNAL MEDICINE

## 2023-10-27 PROCEDURE — 78306 BONE IMAGING WHOLE BODY: CPT | Performed by: INTERNAL MEDICINE

## 2023-10-27 RX ORDER — TC 99M MEDRONATE 20 MG/10ML
24.5 INJECTION, POWDER, LYOPHILIZED, FOR SOLUTION INTRAVENOUS
Status: COMPLETED | OUTPATIENT
Start: 2023-10-27 | End: 2023-10-27

## 2023-10-27 RX ADMIN — TC 99M MEDRONATE 24.5 MILLICURIE: 20 INJECTION, POWDER, LYOPHILIZED, FOR SOLUTION INTRAVENOUS at 08:55

## 2024-03-15 ENCOUNTER — HOSPITAL ENCOUNTER (OUTPATIENT)
Dept: CT IMAGING | Age: 64
Discharge: HOME | End: 2024-03-15
Payer: COMMERCIAL

## 2024-03-15 DIAGNOSIS — C50.012 MALIGNANT NEOPLASM OF NIPPLE OF LEFT BREAST IN FEMALE, UNSPECIFIED ESTROGEN RECEPTOR STATUS (HCC): ICD-10-CM

## 2024-03-15 PROCEDURE — 74176 CT ABD & PELVIS W/O CONTRAST: CPT

## 2024-05-08 ENCOUNTER — HOSPITAL ENCOUNTER (OUTPATIENT)
Dept: CT IMAGING | Age: 64
Discharge: HOME | End: 2024-05-08
Payer: COMMERCIAL

## 2024-05-08 DIAGNOSIS — R29.91 MUSCULOSKELETAL SYMPTOM: ICD-10-CM

## 2024-05-08 DIAGNOSIS — C50.012 CARCINOMA OF NIPPLE AND AREOLA OF FEMALE BREAST, LEFT (HCC): ICD-10-CM

## 2024-05-08 PROCEDURE — 73200 CT UPPER EXTREMITY W/O DYE: CPT

## 2024-05-30 ENCOUNTER — HOSPITAL ENCOUNTER (OUTPATIENT)
Dept: CT IMAGING | Age: 64
Discharge: HOME | End: 2024-05-30
Payer: MEDICARE

## 2024-05-30 ENCOUNTER — HOSPITAL ENCOUNTER (OUTPATIENT)
Dept: CT IMAGING | Age: 64
Discharge: HOME | End: 2024-05-30

## 2024-05-30 DIAGNOSIS — C50.012 CARCINOMA OF NIPPLE AND AREOLA OF FEMALE BREAST, LEFT (HCC): ICD-10-CM

## 2024-05-30 PROCEDURE — 73200 CT UPPER EXTREMITY W/O DYE: CPT

## 2024-10-08 ENCOUNTER — HOSPITAL ENCOUNTER (OUTPATIENT)
Dept: CT IMAGING | Age: 64
Discharge: HOME | End: 2024-10-08
Attending: INTERNAL MEDICINE
Payer: COMMERCIAL

## 2024-10-08 DIAGNOSIS — C50.012 MALIGNANT NEOPLASM OF NIPPLE OF LEFT BREAST IN FEMALE, UNSPECIFIED ESTROGEN RECEPTOR STATUS (HCC): ICD-10-CM

## 2024-10-08 LAB
PERFORMED ON: ABNORMAL
POC CREATININE: 1.4 MG/DL (ref 0.6–1.2)
POC SAMPLE TYPE: ABNORMAL

## 2024-10-08 PROCEDURE — 6360000004 HC RX CONTRAST MEDICATION: Performed by: INTERNAL MEDICINE

## 2024-10-08 PROCEDURE — 74177 CT ABD & PELVIS W/CONTRAST: CPT

## 2024-10-08 PROCEDURE — 82565 ASSAY OF CREATININE: CPT

## 2024-10-08 RX ORDER — DIATRIZOATE MEGLUMINE AND DIATRIZOATE SODIUM 660; 100 MG/ML; MG/ML
12 SOLUTION ORAL; RECTAL
Status: DISCONTINUED | OUTPATIENT
Start: 2024-10-08 | End: 2024-10-09 | Stop reason: HOSPADM

## 2024-10-08 RX ORDER — IOPAMIDOL 755 MG/ML
75 INJECTION, SOLUTION INTRAVASCULAR
Status: COMPLETED | OUTPATIENT
Start: 2024-10-08 | End: 2024-10-08

## 2024-10-08 RX ADMIN — IOPAMIDOL 75 ML: 755 INJECTION, SOLUTION INTRAVENOUS at 16:40

## 2024-10-08 RX ADMIN — DIATRIZOATE MEGLUMINE AND DIATRIZOATE SODIUM 12 ML: 660; 100 LIQUID ORAL; RECTAL at 16:41

## 2025-01-28 ENCOUNTER — HOSPITAL ENCOUNTER (OUTPATIENT)
Dept: CT IMAGING | Age: 65
Discharge: HOME | End: 2025-01-28
Attending: INTERNAL MEDICINE
Payer: COMMERCIAL

## 2025-01-28 DIAGNOSIS — C50.012 MALIGNANT NEOPLASM OF NIPPLE OF LEFT BREAST IN FEMALE, UNSPECIFIED ESTROGEN RECEPTOR STATUS (HCC): ICD-10-CM

## 2025-01-28 PROCEDURE — 74176 CT ABD & PELVIS W/O CONTRAST: CPT

## 2025-04-06 ENCOUNTER — TELEPHONE (OUTPATIENT)
Dept: ORTHOPEDIC SURGERY | Facility: CLINIC | Age: 65
End: 2025-04-06
Payer: COMMERCIAL

## 2025-04-06 NOTE — TELEPHONE ENCOUNTER
Received phone call through [facility] while on practice call regarding this patient.  Patient has been having difficulty with her left elbow for 1 month.  Afebrile.  Nonseptic.  The ER physician, Dr. Palm, told me that the patient had elevated D-dimer and CRP and a CT scan had been performed as well.  I recommended that the ER doctor provide the patient with a copy of the labs and a disc of the CT scan and x-rays that may have been performed.  I advised him to tell the patient to call Dr. Sunshine's office first thing in the morning for an appointment for further evaluation and treatment.

## 2025-04-18 ENCOUNTER — LAB (OUTPATIENT)
Facility: HOSPITAL | Age: 65
End: 2025-04-18
Payer: MEDICARE

## 2025-04-18 ENCOUNTER — TRANSCRIBE ORDERS (OUTPATIENT)
Facility: HOSPITAL | Age: 65
End: 2025-04-18
Payer: COMMERCIAL

## 2025-04-18 DIAGNOSIS — C50.912 MALIGNANT NEOPLASM OF LEFT FEMALE BREAST, UNSPECIFIED ESTROGEN RECEPTOR STATUS, UNSPECIFIED SITE OF BREAST: Primary | ICD-10-CM

## 2025-04-18 DIAGNOSIS — M25.422 EFFUSION OF LEFT ELBOW: ICD-10-CM

## 2025-04-18 DIAGNOSIS — Z96.622 PRESENCE OF LEFT ARTIFICIAL ELBOW JOINT: Primary | ICD-10-CM

## 2025-04-18 DIAGNOSIS — Z96.622 PRESENCE OF LEFT ARTIFICIAL ELBOW JOINT: ICD-10-CM

## 2025-04-18 LAB
ANISOCYTOSIS BLD QL: NORMAL
BASOPHILS # BLD AUTO: 0.02 10*3/MM3 (ref 0–0.2)
BASOPHILS NFR BLD AUTO: 0.4 % (ref 0–1.5)
CRP SERPL-MCNC: 14.5 MG/DL (ref 0–0.5)
DEPRECATED RDW RBC AUTO: 52.6 FL (ref 37–54)
EOSINOPHIL # BLD AUTO: 0.15 10*3/MM3 (ref 0–0.4)
EOSINOPHIL NFR BLD AUTO: 3.3 % (ref 0.3–6.2)
ERYTHROCYTE [DISTWIDTH] IN BLOOD BY AUTOMATED COUNT: 20.3 % (ref 12.3–15.4)
ERYTHROCYTE [SEDIMENTATION RATE] IN BLOOD: 74 MM/HR (ref 0–30)
HCT VFR BLD AUTO: 28.9 % (ref 34–46.6)
HGB BLD-MCNC: 8.5 G/DL (ref 12–15.9)
IMM GRANULOCYTES # BLD AUTO: 0.02 10*3/MM3 (ref 0–0.05)
IMM GRANULOCYTES NFR BLD AUTO: 0.4 % (ref 0–0.5)
LYMPHOCYTES # BLD AUTO: 0.52 10*3/MM3 (ref 0.7–3.1)
LYMPHOCYTES NFR BLD AUTO: 11.5 % (ref 19.6–45.3)
MCH RBC QN AUTO: 20.6 PG (ref 26.6–33)
MCHC RBC AUTO-ENTMCNC: 29.4 G/DL (ref 31.5–35.7)
MCV RBC AUTO: 70.1 FL (ref 79–97)
MICROCYTES BLD QL: NORMAL
MONOCYTES # BLD AUTO: 0.47 10*3/MM3 (ref 0.1–0.9)
MONOCYTES NFR BLD AUTO: 10.4 % (ref 5–12)
NEUTROPHILS NFR BLD AUTO: 3.33 10*3/MM3 (ref 1.7–7)
NEUTROPHILS NFR BLD AUTO: 74 % (ref 42.7–76)
OVALOCYTES BLD QL SMEAR: NORMAL
PLATELET # BLD AUTO: 184 10*3/MM3 (ref 140–450)
PMV BLD AUTO: 8.6 FL (ref 6–12)
RBC # BLD AUTO: 4.12 10*6/MM3 (ref 3.77–5.28)
SMALL PLATELETS BLD QL SMEAR: ADEQUATE
WBC MORPH BLD: NORMAL
WBC NRBC COR # BLD AUTO: 4.51 10*3/MM3 (ref 3.4–10.8)

## 2025-04-18 PROCEDURE — 87040 BLOOD CULTURE FOR BACTERIA: CPT

## 2025-04-18 PROCEDURE — 85025 COMPLETE CBC W/AUTO DIFF WBC: CPT

## 2025-04-18 PROCEDURE — 36415 COLL VENOUS BLD VENIPUNCTURE: CPT

## 2025-04-18 PROCEDURE — 86140 C-REACTIVE PROTEIN: CPT

## 2025-04-18 PROCEDURE — 85652 RBC SED RATE AUTOMATED: CPT

## 2025-04-18 PROCEDURE — 85007 BL SMEAR W/DIFF WBC COUNT: CPT

## 2025-04-23 LAB — BACTERIA SPEC AEROBE CULT: NORMAL

## 2025-04-25 ENCOUNTER — ANESTHESIA EVENT (OUTPATIENT)
Dept: PERIOP | Facility: HOSPITAL | Age: 65
End: 2025-04-25
Payer: COMMERCIAL

## 2025-04-27 RX ORDER — FAMOTIDINE 10 MG/ML
20 INJECTION, SOLUTION INTRAVENOUS ONCE
Status: CANCELLED | OUTPATIENT
Start: 2025-04-27 | End: 2025-04-27

## 2025-04-28 ENCOUNTER — ANESTHESIA (OUTPATIENT)
Dept: PERIOP | Facility: HOSPITAL | Age: 65
End: 2025-04-28
Payer: COMMERCIAL

## 2025-04-28 ENCOUNTER — ANESTHESIA EVENT CONVERTED (OUTPATIENT)
Dept: ANESTHESIOLOGY | Facility: HOSPITAL | Age: 65
End: 2025-04-28
Payer: COMMERCIAL

## 2025-04-28 ENCOUNTER — APPOINTMENT (OUTPATIENT)
Dept: GENERAL RADIOLOGY | Facility: HOSPITAL | Age: 65
End: 2025-04-28
Payer: COMMERCIAL

## 2025-04-28 ENCOUNTER — HOSPITAL ENCOUNTER (OUTPATIENT)
Facility: HOSPITAL | Age: 65
Discharge: HOME | End: 2025-05-01
Attending: ORTHOPAEDIC SURGERY | Admitting: ORTHOPAEDIC SURGERY
Payer: COMMERCIAL

## 2025-04-28 DIAGNOSIS — Z96.619 INFECTION OF PROSTHETIC SHOULDER JOINT, INITIAL ENCOUNTER: Primary | ICD-10-CM

## 2025-04-28 DIAGNOSIS — T84.50XA PROSTHETIC JOINT INFECTION: ICD-10-CM

## 2025-04-28 DIAGNOSIS — T84.59XA INFECTION OF PROSTHETIC SHOULDER JOINT, INITIAL ENCOUNTER: Primary | ICD-10-CM

## 2025-04-28 PROCEDURE — 25010000002 ONDANSETRON PER 1 MG

## 2025-04-28 PROCEDURE — 87070 CULTURE OTHR SPECIMN AEROBIC: CPT | Performed by: ORTHOPAEDIC SURGERY

## 2025-04-28 PROCEDURE — 87015 SPECIMEN INFECT AGNT CONCNTJ: CPT | Performed by: ORTHOPAEDIC SURGERY

## 2025-04-28 PROCEDURE — 25810000003 SODIUM CHLORIDE 0.9 % SOLUTION 250 ML FLEX CONT: Performed by: ORTHOPAEDIC SURGERY

## 2025-04-28 PROCEDURE — 25010000002 VANCOMYCIN 1 G RECONSTITUTED SOLUTION 1 EACH VIAL: Performed by: ORTHOPAEDIC SURGERY

## 2025-04-28 PROCEDURE — 87206 SMEAR FLUORESCENT/ACID STAI: CPT | Performed by: ORTHOPAEDIC SURGERY

## 2025-04-28 PROCEDURE — C1776 JOINT DEVICE (IMPLANTABLE): HCPCS | Performed by: ORTHOPAEDIC SURGERY

## 2025-04-28 PROCEDURE — 87102 FUNGUS ISOLATION CULTURE: CPT | Performed by: ORTHOPAEDIC SURGERY

## 2025-04-28 PROCEDURE — 93005 ELECTROCARDIOGRAM TRACING: CPT | Performed by: ANESTHESIOLOGY

## 2025-04-28 PROCEDURE — 25010000002 SUGAMMADEX 200 MG/2ML SOLUTION

## 2025-04-28 PROCEDURE — 25010000002 BUPIVACAINE (PF) 0.25 % SOLUTION: Performed by: NURSE ANESTHETIST, CERTIFIED REGISTERED

## 2025-04-28 PROCEDURE — 87205 SMEAR GRAM STAIN: CPT | Performed by: ORTHOPAEDIC SURGERY

## 2025-04-28 PROCEDURE — C1755 CATHETER, INTRASPINAL: HCPCS | Performed by: ORTHOPAEDIC SURGERY

## 2025-04-28 PROCEDURE — 87116 MYCOBACTERIA CULTURE: CPT | Performed by: ORTHOPAEDIC SURGERY

## 2025-04-28 PROCEDURE — 25010000002 PROPOFOL 10 MG/ML EMULSION: Performed by: NURSE ANESTHETIST, CERTIFIED REGISTERED

## 2025-04-28 PROCEDURE — 25010000002 CEFAZOLIN PER 500 MG: Performed by: ORTHOPAEDIC SURGERY

## 2025-04-28 PROCEDURE — 87176 TISSUE HOMOGENIZATION CULTR: CPT | Performed by: ORTHOPAEDIC SURGERY

## 2025-04-28 PROCEDURE — 25010000002 DEXAMETHASONE PER 1 MG: Performed by: NURSE ANESTHETIST, CERTIFIED REGISTERED

## 2025-04-28 PROCEDURE — 25010000002 FENTANYL CITRATE (PF) 50 MCG/ML SOLUTION: Performed by: NURSE ANESTHETIST, CERTIFIED REGISTERED

## 2025-04-28 PROCEDURE — 25810000003 LACTATED RINGERS PER 1000 ML: Performed by: ANESTHESIOLOGY

## 2025-04-28 PROCEDURE — 73080 X-RAY EXAM OF ELBOW: CPT

## 2025-04-28 PROCEDURE — 25010000002 CEFTRIAXONE PER 250 MG: Performed by: INTERNAL MEDICINE

## 2025-04-28 PROCEDURE — 25010000002 LIDOCAINE PF 1% 1 % SOLUTION: Performed by: ANESTHESIOLOGY

## 2025-04-28 PROCEDURE — 25010000002 ROPIVACAINE HCL-NACL 0.2-0.9 % SOLUTION: Performed by: NURSE ANESTHETIST, CERTIFIED REGISTERED

## 2025-04-28 PROCEDURE — 25010000002 FENTANYL CITRATE (PF) 50 MCG/ML SOLUTION

## 2025-04-28 PROCEDURE — 87075 CULTR BACTERIA EXCEPT BLOOD: CPT | Performed by: ORTHOPAEDIC SURGERY

## 2025-04-28 PROCEDURE — 25810000003 SODIUM CHLORIDE 0.9 % SOLUTION: Performed by: ORTHOPAEDIC SURGERY

## 2025-04-28 DEVICE — IMPLANTABLE DEVICE: Type: IMPLANTABLE DEVICE | Site: ELBOW | Status: FUNCTIONAL

## 2025-04-28 RX ORDER — SODIUM CHLORIDE 0.9 % (FLUSH) 0.9 %
10 SYRINGE (ML) INJECTION AS NEEDED
Status: DISCONTINUED | OUTPATIENT
Start: 2025-04-28 | End: 2025-05-01 | Stop reason: HOSPADM

## 2025-04-28 RX ORDER — ROPIVACAINE HYDROCHLORIDE 2 MG/ML
INJECTION, SOLUTION EPIDURAL; INFILTRATION; PERINEURAL CONTINUOUS
Status: DISCONTINUED | OUTPATIENT
Start: 2025-04-28 | End: 2025-05-01 | Stop reason: HOSPADM

## 2025-04-28 RX ORDER — LABETALOL HYDROCHLORIDE 5 MG/ML
10 INJECTION, SOLUTION INTRAVENOUS EVERY 4 HOURS PRN
Status: DISCONTINUED | OUTPATIENT
Start: 2025-04-28 | End: 2025-05-01 | Stop reason: HOSPADM

## 2025-04-28 RX ORDER — FENTANYL CITRATE 50 UG/ML
50 INJECTION, SOLUTION INTRAMUSCULAR; INTRAVENOUS
Status: DISCONTINUED | OUTPATIENT
Start: 2025-04-28 | End: 2025-04-28 | Stop reason: HOSPADM

## 2025-04-28 RX ORDER — EXEMESTANE 25 MG/1
25 TABLET ORAL DAILY
COMMUNITY

## 2025-04-28 RX ORDER — SODIUM CHLORIDE 9 MG/ML
40 INJECTION, SOLUTION INTRAVENOUS AS NEEDED
Status: DISCONTINUED | OUTPATIENT
Start: 2025-04-28 | End: 2025-05-01 | Stop reason: HOSPADM

## 2025-04-28 RX ORDER — SODIUM CHLORIDE 0.9 % (FLUSH) 0.9 %
10 SYRINGE (ML) INJECTION EVERY 12 HOURS SCHEDULED
Status: DISCONTINUED | OUTPATIENT
Start: 2025-04-28 | End: 2025-05-01 | Stop reason: HOSPADM

## 2025-04-28 RX ORDER — TRANEXAMIC ACID 10 MG/ML
1000 INJECTION, SOLUTION INTRAVENOUS ONCE
Status: COMPLETED | OUTPATIENT
Start: 2025-04-28 | End: 2025-04-28

## 2025-04-28 RX ORDER — METOPROLOL SUCCINATE 100 MG/1
100 TABLET, EXTENDED RELEASE ORAL DAILY
Status: DISCONTINUED | OUTPATIENT
Start: 2025-04-29 | End: 2025-05-01 | Stop reason: HOSPADM

## 2025-04-28 RX ORDER — ONDANSETRON 2 MG/ML
INJECTION INTRAMUSCULAR; INTRAVENOUS AS NEEDED
Status: DISCONTINUED | OUTPATIENT
Start: 2025-04-28 | End: 2025-04-28 | Stop reason: SURG

## 2025-04-28 RX ORDER — FENTANYL CITRATE 50 UG/ML
INJECTION, SOLUTION INTRAMUSCULAR; INTRAVENOUS
Status: COMPLETED
Start: 2025-04-28 | End: 2025-04-28

## 2025-04-28 RX ORDER — MAGNESIUM HYDROXIDE 1200 MG/15ML
LIQUID ORAL AS NEEDED
Status: DISCONTINUED | OUTPATIENT
Start: 2025-04-28 | End: 2025-04-28 | Stop reason: HOSPADM

## 2025-04-28 RX ORDER — SODIUM CHLORIDE, SODIUM LACTATE, POTASSIUM CHLORIDE, CALCIUM CHLORIDE 600; 310; 30; 20 MG/100ML; MG/100ML; MG/100ML; MG/100ML
9 INJECTION, SOLUTION INTRAVENOUS CONTINUOUS
Status: ACTIVE | OUTPATIENT
Start: 2025-04-29 | End: 2025-04-29

## 2025-04-28 RX ORDER — SODIUM CHLORIDE 9 MG/ML
9 INJECTION, SOLUTION INTRAVENOUS CONTINUOUS
Status: DISCONTINUED | OUTPATIENT
Start: 2025-04-28 | End: 2025-04-29

## 2025-04-28 RX ORDER — LIDOCAINE HYDROCHLORIDE 10 MG/ML
0.5 INJECTION, SOLUTION EPIDURAL; INFILTRATION; INTRACAUDAL; PERINEURAL ONCE AS NEEDED
Status: COMPLETED | OUTPATIENT
Start: 2025-04-28 | End: 2025-04-28

## 2025-04-28 RX ORDER — PROPOFOL 10 MG/ML
VIAL (ML) INTRAVENOUS AS NEEDED
Status: DISCONTINUED | OUTPATIENT
Start: 2025-04-28 | End: 2025-04-28 | Stop reason: SURG

## 2025-04-28 RX ORDER — CITALOPRAM HYDROBROMIDE 40 MG/1
40 TABLET ORAL DAILY
Status: DISCONTINUED | OUTPATIENT
Start: 2025-04-29 | End: 2025-05-01 | Stop reason: HOSPADM

## 2025-04-28 RX ORDER — EVEROLIMUS 5 MG/1
5 TABLET ORAL DAILY
COMMUNITY

## 2025-04-28 RX ORDER — NALOXONE HCL 0.4 MG/ML
0.1 VIAL (ML) INJECTION
Status: DISCONTINUED | OUTPATIENT
Start: 2025-04-28 | End: 2025-05-01 | Stop reason: HOSPADM

## 2025-04-28 RX ORDER — ONDANSETRON 2 MG/ML
4 INJECTION INTRAMUSCULAR; INTRAVENOUS ONCE AS NEEDED
Status: DISCONTINUED | OUTPATIENT
Start: 2025-04-28 | End: 2025-04-28 | Stop reason: HOSPADM

## 2025-04-28 RX ORDER — SODIUM CHLORIDE 0.9 % (FLUSH) 0.9 %
10 SYRINGE (ML) INJECTION EVERY 12 HOURS SCHEDULED
Status: DISCONTINUED | OUTPATIENT
Start: 2025-04-28 | End: 2025-04-28 | Stop reason: HOSPADM

## 2025-04-28 RX ORDER — BUPIVACAINE HYDROCHLORIDE 2.5 MG/ML
INJECTION, SOLUTION EPIDURAL; INFILTRATION; INTRACAUDAL; PERINEURAL
Status: COMPLETED | OUTPATIENT
Start: 2025-04-28 | End: 2025-04-28

## 2025-04-28 RX ORDER — ROCURONIUM BROMIDE 10 MG/ML
INJECTION, SOLUTION INTRAVENOUS AS NEEDED
Status: DISCONTINUED | OUTPATIENT
Start: 2025-04-28 | End: 2025-04-28 | Stop reason: SURG

## 2025-04-28 RX ORDER — MIDAZOLAM HYDROCHLORIDE 1 MG/ML
1 INJECTION, SOLUTION INTRAMUSCULAR; INTRAVENOUS
Status: DISCONTINUED | OUTPATIENT
Start: 2025-04-28 | End: 2025-04-28 | Stop reason: HOSPADM

## 2025-04-28 RX ORDER — HYDROMORPHONE HYDROCHLORIDE 1 MG/ML
0.5 INJECTION, SOLUTION INTRAMUSCULAR; INTRAVENOUS; SUBCUTANEOUS
Status: DISCONTINUED | OUTPATIENT
Start: 2025-04-28 | End: 2025-04-28 | Stop reason: HOSPADM

## 2025-04-28 RX ORDER — SODIUM CHLORIDE 0.9 % (FLUSH) 0.9 %
10 SYRINGE (ML) INJECTION AS NEEDED
Status: DISCONTINUED | OUTPATIENT
Start: 2025-04-28 | End: 2025-04-28 | Stop reason: HOSPADM

## 2025-04-28 RX ORDER — FAMOTIDINE 20 MG/1
20 TABLET, FILM COATED ORAL ONCE
Status: COMPLETED | OUTPATIENT
Start: 2025-04-28 | End: 2025-04-28

## 2025-04-28 RX ORDER — FENTANYL CITRATE 50 UG/ML
INJECTION, SOLUTION INTRAMUSCULAR; INTRAVENOUS
Status: COMPLETED | OUTPATIENT
Start: 2025-04-28 | End: 2025-04-28

## 2025-04-28 RX ORDER — HYDROMORPHONE HYDROCHLORIDE 2 MG/ML
0.5 INJECTION, SOLUTION INTRAMUSCULAR; INTRAVENOUS; SUBCUTANEOUS
Status: DISCONTINUED | OUTPATIENT
Start: 2025-04-28 | End: 2025-05-01 | Stop reason: HOSPADM

## 2025-04-28 RX ORDER — DEXAMETHASONE SODIUM PHOSPHATE 4 MG/ML
INJECTION, SOLUTION INTRA-ARTICULAR; INTRALESIONAL; INTRAMUSCULAR; INTRAVENOUS; SOFT TISSUE AS NEEDED
Status: DISCONTINUED | OUTPATIENT
Start: 2025-04-28 | End: 2025-04-28 | Stop reason: SURG

## 2025-04-28 RX ADMIN — PROPOFOL 200 MG: 10 INJECTION, EMULSION INTRAVENOUS at 13:33

## 2025-04-28 RX ADMIN — SODIUM CHLORIDE, POTASSIUM CHLORIDE, SODIUM LACTATE AND CALCIUM CHLORIDE 9 ML/HR: 600; 310; 30; 20 INJECTION, SOLUTION INTRAVENOUS at 12:07

## 2025-04-28 RX ADMIN — FENTANYL CITRATE 50 MCG: 50 INJECTION, SOLUTION INTRAMUSCULAR; INTRAVENOUS at 16:11

## 2025-04-28 RX ADMIN — TRANEXAMIC ACID 1000 MG: 10 INJECTION, SOLUTION INTRAVENOUS at 13:38

## 2025-04-28 RX ADMIN — DEXAMETHASONE SODIUM PHOSPHATE 8 MG: 4 INJECTION INTRA-ARTICULAR; INTRALESIONAL; INTRAMUSCULAR; INTRAVENOUS; SOFT TISSUE at 13:39

## 2025-04-28 RX ADMIN — SODIUM CHLORIDE, POTASSIUM CHLORIDE, SODIUM LACTATE AND CALCIUM CHLORIDE: 600; 310; 30; 20 INJECTION, SOLUTION INTRAVENOUS at 14:55

## 2025-04-28 RX ADMIN — ONDANSETRON 4 MG: 2 INJECTION INTRAMUSCULAR; INTRAVENOUS at 15:44

## 2025-04-28 RX ADMIN — Medication 1000 MG: at 15:57

## 2025-04-28 RX ADMIN — BUPIVACAINE HYDROCHLORIDE 30 ML: 2.5 INJECTION, SOLUTION EPIDURAL; INFILTRATION; INTRACAUDAL; PERINEURAL at 12:12

## 2025-04-28 RX ADMIN — VANCOMYCIN HYDROCHLORIDE 1000 MG: 1 INJECTION, POWDER, LYOPHILIZED, FOR SOLUTION INTRAVENOUS at 13:18

## 2025-04-28 RX ADMIN — SUGAMMADEX 200 MG: 100 INJECTION, SOLUTION INTRAVENOUS at 15:44

## 2025-04-28 RX ADMIN — LIDOCAINE HYDROCHLORIDE 0.5 ML: 10 INJECTION, SOLUTION EPIDURAL; INFILTRATION; INTRACAUDAL; PERINEURAL at 12:07

## 2025-04-28 RX ADMIN — SODIUM CHLORIDE 9 ML/HR: 9 INJECTION, SOLUTION INTRAVENOUS at 13:19

## 2025-04-28 RX ADMIN — TRANEXAMIC ACID 1000 MG: 10 INJECTION, SOLUTION INTRAVENOUS at 15:44

## 2025-04-28 RX ADMIN — FAMOTIDINE 20 MG: 20 TABLET ORAL at 12:08

## 2025-04-28 RX ADMIN — SODIUM CHLORIDE 2000 MG: 900 INJECTION INTRAVENOUS at 12:41

## 2025-04-28 RX ADMIN — CEFTRIAXONE 2000 MG: 2 INJECTION, POWDER, FOR SOLUTION INTRAMUSCULAR; INTRAVENOUS at 23:33

## 2025-04-28 RX ADMIN — ROCURONIUM BROMIDE 50 MG: 10 INJECTION INTRAVENOUS at 13:33

## 2025-04-28 RX ADMIN — Medication 10 ML: at 21:24

## 2025-04-28 RX ADMIN — FENTANYL CITRATE 100 MCG: 50 INJECTION, SOLUTION INTRAMUSCULAR; INTRAVENOUS at 12:12

## 2025-04-28 RX ADMIN — ROCURONIUM BROMIDE 10 MG: 10 INJECTION INTRAVENOUS at 15:00

## 2025-04-28 NOTE — ANESTHESIA PREPROCEDURE EVALUATION
Anesthesia Evaluation     Patient summary reviewed and Nursing notes reviewed   NPO Solid Status: > 8 hours  NPO Liquid Status: > 2 hours           Airway   Mallampati: II  TM distance: >3 FB  Neck ROM: full  Possible difficult intubation and Narrow palate  Dental      Pulmonary     breath sounds clear to auscultation  Cardiovascular     ECG reviewed  Rhythm: regular  Rate: normal    (+) hypertension      Neuro/Psych  GI/Hepatic/Renal/Endo    (+) renal disease- CRI    Musculoskeletal     Abdominal    Substance History      OB/GYN          Other   arthritis, blood dyscrasia anemia,   history of cancer    ROS/Med Hx Other: Breast cancer; chronic anemia after chemo      Phys Exam Other: Prominent upper incisors            Anesthesia Plan    ASA 3     general with block     intravenous induction     Anesthetic plan, risks, benefits, and alternatives have been provided, discussed and informed consent has been obtained with: patient.    Plan discussed with CRNA.    CODE STATUS:

## 2025-04-28 NOTE — ANESTHESIA POSTPROCEDURE EVALUATION
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, initial encounter [[id number]])    Surgeons: Juan David Sunshine MD Provider: Bogdan Law Jr., MD    Anesthesia Type: general with block ASA Status: 3            Anesthesia Type: general with block    Vitals  Vitals Value Taken Time   /67 04/28/25 15:55   Temp 97.5 °F (36.4 °C) 04/28/25 15:55   Pulse 87 04/28/25 15:55   Resp 16 04/28/25 15:55   SpO2 100 % 04/28/25 15:55           Post Anesthesia Care and Evaluation    Patient location during evaluation: PACU  Patient participation: complete - patient participated  Level of consciousness: awake and alert  Pain management: adequate    Airway patency: patent  Anesthetic complications: No anesthetic complications  PONV Status: none  Cardiovascular status: hemodynamically stable and acceptable  Respiratory status: nonlabored ventilation, acceptable, nasal cannula and spontaneous ventilation  Hydration status: acceptable

## 2025-04-28 NOTE — H&P
Patient Name: Britney Raman  MRN: [id number]  : 1960  DOS: 2025    Attending: Juan David Sunshine MD    Primary Care Provider: Fatou Vera MD      Chief complaint: Left elbow prosthetic joint infection    Subjective   Patient is a pleasant 64 y.o. female presented for scheduled procedure by .    Patient underwent left total elbow replacement 3 years ago along with ulnar nerve transposition.  She has done well postoperatively with no complications early on.    She presented in April of this year with progressive discomfort in her left elbow and occasional episodes of this getting warm and hot.  She had hospitalizations at her local hospital.    Per 's note: ( This is a 64-year-old female who underwent a left total elbow arthroplasty for pathologic distal humerus fracture due to metastatic breast cancer. She was doing well until a few weeks ago when she began developing swelling and erythema about her elbow. She presented to the office with inflammatory findings and concern for septic arthritis. CT scan showed significant fluid. Risks and benefits of surgery as well as alternatives were discussed with plan for washout and bushing exchange with antibiotic suppression given her very well-fixed implants and comorbidities. She understood and wished to proceed.  )    Her workup was consistent with prosthetic joint infection and today she underwent revision left total elbow arthroplasty.    Surgery was done under general anesthesia on a block, was tolerated well, she is admitted for further management.    When seen in PACU, she is dealing with some postoperative pain.  She is drowsy due to pain medication but answers questions fairly well       Allergies   Allergen Reactions    Clindamycin GI Intolerance    Bactrim Ds [Sulfamethoxazole-Trimethoprim] Rash       Meds:  Medications Prior to Admission   Medication Sig Dispense Refill Last Dose/Taking    Acetaminophen (TYLENOL ARTHRITIS  PAIN PO) Take  by mouth As Needed.   4/27/2025    citalopram (CeleXA) 40 MG tablet Take 1 tablet by mouth Daily.   Past Week    exemestane (AROMASIN) 25 MG tablet Take 1 tablet by mouth Daily.   Past Week    metoprolol succinate XL (TOPROL-XL) 100 MG 24 hr tablet Take 1 tablet by mouth Daily.   4/27/2025    everolimus (AFINITOR) 5 MG tablet Take 1 tablet by mouth Daily.   4/25/2025          Past Medical History:   Diagnosis Date    Arthritis     Breast cancer 2018    Left - had mastectomy and subsequent chemo and radiation - new diagnosis (2022) of right breast CA and mets to spine    Cataract     MILD    GERD (gastroesophageal reflux disease)     Hx MRSA infection 2018    right arm x 2 and right leg; most recent treatment 2018    Knee pain     LEFT MAINLY    PONV (postoperative nausea and vomiting)     AFTER BREAST SURGERY- HAD WATER AND THREW IT UP IMMEDIATELY    Urinary incontinence in female     UTI (urinary tract infection)     Wears glasses      Past Surgical History:   Procedure Laterality Date    BREAST BIOPSY Bilateral     DIAGNOSTIC LAPAROSCOPY      ENDOSCOPY      EYE SURGERY Bilateral     AS CHILD    INJECTION OF MEDICATION      X7 LEFT KNEE    MASTECTOMY Left 2018    PORTACATH PLACEMENT      TOTAL ELBOW ARTHROPLASTY Left 5/25/2022    Procedure: TOTAL ELBOW ARTHROPLASTY FOR FRACTURE,  ULNAR NERVE TRANSPOSITION;  Surgeon: Juan David Sunshine MD;  Location: [facility];  Service: Orthopedics;  Laterality: Left;    TUBAL ABDOMINAL LIGATION       Family History   Problem Relation Age of Onset    Heart disease Mother     Heart disease Father      Social History     Tobacco Use    Smoking status: Former     Current packs/day: 0.50     Average packs/day: 0.5 packs/day for 5.0 years (2.5 ttl pk-yrs)     Types: Cigarettes    Smokeless tobacco: Never    Tobacco comments:     QUIT AGE 19   Vaping Use    Vaping status: Never Used   Substance Use Topics    Alcohol use: Never    Drug use: Never   .    Review of  "Systems  Pertinent items are noted in HPI    Vital Signs  /78   Pulse 93   Temp 98.7 °F (37.1 °C) (Oral)   Resp 18   Ht 170.2 cm (67\")   Wt 63.5 kg (140 lb)   SpO2 98%   BMI 21.93 kg/m²     Physical Exam:    General Appearance:    Alert, cooperative, in no acute distress   Head:    Normocephalic, without obvious abnormality, atraumatic   Eyes:            Lids and lashes normal, conjunctivae and sclerae normal, no   icterus, no pallor, corneas clear,    Ears:    Ears appear intact with no abnormalities noted   Throat:   No oral lesions, no thrush, oral mucosa moist   Neck:   No adenopathy, supple, trachea midline, no thyromegaly         Lungs:     Clear to auscultation,respirations regular, even and                   unlabored    Heart:    Regular rhythm and normal rate, normal S1 and S2, no            murmur, no gallop   Abdomen:     Normal bowel sounds, no masses, no organomegaly, soft,        nontender, nondistended, no guarding, no rebound                 tenderness   Genitalia:    Deferred   Extremities: Left upper extremity in a sling, dressing clean and intact.   Pulses:   Pulses palpable and equal bilaterally   Skin:   No bleeding, bruising or rash          I reviewed the patient's new clinical results.             Invalid input(s): \"NEUTOPHILPCT\"        Invalid input(s): \"LABALBU\", \"PROT\"  Lab Results   Component Value Date    HGBA1C 4.50 (L) 05/23/2022      Latest Reference Range & Units 04/18/25 13:08   C-Reactive Protein 0.00 - 0.50 mg/dL 14.50 (H)   WBC 3.40 - 10.80 10*3/mm3 4.51   RBC 3.77 - 5.28 10*6/mm3 4.12   Hemoglobin 12.0 - 15.9 g/dL 8.5 (L)   Hematocrit 34.0 - 46.6 % 28.9 (L)   Platelets 140 - 450 10*3/mm3 184   RDW 12.3 - 15.4 % 20.3 (H)   MCV 79.0 - 97.0 fL 70.1 (L)   MCH 26.6 - 33.0 pg 20.6 (L)   MCHC 31.5 - 35.7 g/dL 29.4 (L)   MPV 6.0 - 12.0 fL 8.6   RDW-SD 37.0 - 54.0 fl 52.6   Neutrophil Rel % 42.7 - 76.0 % 74.0   Lymphocyte Rel % 19.6 - 45.3 % 11.5 (L)   Monocyte Rel % 5.0 - " 12.0 % 10.4   Eosinophil Rel % 0.3 - 6.2 % 3.3   Basophil Rel % 0.0 - 1.5 % 0.4   Immature Granulocyte Rel % 0.0 - 0.5 % 0.4   Neutrophils Absolute 1.70 - 7.00 10*3/mm3 3.33   Lymphocytes Absolute 0.70 - 3.10 10*3/mm3 0.52 (L)   Monocytes Absolute 0.10 - 0.90 10*3/mm3 0.47   Eosinophils Absolute 0.00 - 0.40 10*3/mm3 0.15   (H): Data is abnormally high  (L): Data is abnormally low      Assessment and Plan:     Revision left total elbow arthroplasty.    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 PT/OT, left upper extremity weightbearing status per Dr. Sunshine  2. Pain control-prns, multimodal approach, peripheral nerve block catheter.  3. IS-encourage  4. DVT proph- mechanicals and aspirin  5. Bowel regimen  6. Resume home medications as appropriate  7. Monitor post-op labs  8. Discharge planning     - Hypertension:  Resume home medications as appropriate, formulary substitution when indicated.  Holding parameters.  PRN medications for elevated blood pressure.     -GERD:  Resume PPI.  Formulary substitution when indicated.    - Monitor operative cultures, perioperative antibiotics will be managed by Dr. Tavon Valentin with [facility] Jones disclaimer:  Part of this encounter note is an electronic transcription/translation of spoken language to printed text. The electronic translation of spoken language may permit erroneous, or at times, nonsensical words or phrases to be inadvertently transcribed; Although I have reviewed the note for such errors, some may still exist.    Freeman Perales MD  04/28/25  18:23 EDT

## 2025-04-28 NOTE — ANESTHESIA PROCEDURE NOTES
Airway  Reason: elective    Date/Time: 4/28/2025 1:34 PM  Airway not difficult    General Information and Staff    Patient location during procedure: OR  CRNA/CAA: Ernesto Saleem CRNA    Indications and Patient Condition  Indications for airway management: airway protection    Preoxygenated: yes  MILS not maintained throughout    Mask difficulty assessment: 1 - vent by mask    Final Airway Details    Final airway type: endotracheal airway      Successful airway: ETT  Cuffed: yes   Successful intubation technique: direct laryngoscopy  Adjuncts used in placement: intubating stylet and Bougie  Endotracheal tube insertion site: oral  Blade: Darryn  Blade size: 3  ETT size (mm): 7.0  Cormack-Lehane Classification: grade III - view of epiglottis only  Placement verified by: chest auscultation and capnometry   Measured from: lips  ETT/EBT  to lips (cm): 20  Number of attempts at approach: 1  Assessment: lips, teeth, and gum same as pre-op and atraumatic intubation    Additional Comments  Negative epigastric sounds, Breath sound equal bilaterally with symmetric chest rise and fall

## 2025-04-28 NOTE — CONSULTS
INFECTIOUS DISEASE CONSULT/INITIAL HOSPITAL VISIT    Britney Raman  1960  [id number]    Date of Consult: 4/28/2025    Admission Date: 4/28/2025      Requesting Provider: Juan David Sunshine MD  Evaluating Physician: Tavon Valentin MD    Reason for Consultation:  Left total elbow arthroplasty infection    History of present illness:    4/28/25: Patient is a 64 y.o. female  with metastatic breast cancer who is seen today for evaluation of a left total elbow arthroplasty infection.  She has a history of bilateral breast cancer with metastatic disease to her liver, spine, and lung.  She developed a pathologic distal humerus fracture and underwent left total elbow arthroplasty on 5/25/2022.   Approximately 2-3 months ago she noted the onset of slowly increasing left elbow pain.  2 weeks ago she noted the onset of left elbow swelling with worsening erythema.   CT scan revealed significant elbow fluid.  She has not been on antibiotic therapy and did  not undergo elbow aspiration.   She is scheduled for left elbow debridement with poly exchange today.  She denies fever, chills, sweats.  She has a history of prior MRSA axillary infection.  She states that she is allergic to clindamycin and Bactrim after developing a rash while on both of these medications.  She is uncertain  which antibiotic caused the rash.    she has received perioperative vancomycin and cefazolin prophylaxis.  I saw her in the preoperative area.  At the time of her surgery she was found to have purulent material with necrotic tissue.    Laboratory studies from 4/18 revealed a C-reactive protein of 14.5, sedimentation of 74, and white blood cell count of 4.5.   Past Medical History:   Diagnosis Date    Arthritis     Breast cancer 2018    Left - had mastectomy and subsequent chemo and radiation - new diagnosis (2022) of right breast CA and mets to spine    Cataract     MILD    GERD (gastroesophageal reflux disease)     Hx MRSA infection 2018     right arm x 2 and right leg; most recent treatment 2018    Knee pain     LEFT MAINLY    PONV (postoperative nausea and vomiting)     AFTER BREAST SURGERY- HAD WATER AND THREW IT UP IMMEDIATELY    Urinary incontinence in female     UTI (urinary tract infection)     Wears glasses        Past Surgical History:   Procedure Laterality Date    BREAST BIOPSY Bilateral     DIAGNOSTIC LAPAROSCOPY      ENDOSCOPY      EYE SURGERY Bilateral     AS CHILD    INJECTION OF MEDICATION      X7 LEFT KNEE    MASTECTOMY Left 2018    PORTACATH PLACEMENT      TOTAL ELBOW ARTHROPLASTY Left 5/25/2022    Procedure: TOTAL ELBOW ARTHROPLASTY FOR FRACTURE,  ULNAR NERVE TRANSPOSITION;  Surgeon: Juan David Sunshine MD;  Location: [facility];  Service: Orthopedics;  Laterality: Left;    TUBAL ABDOMINAL LIGATION         Family History   Problem Relation Age of Onset    Heart disease Mother     Heart disease Father        Social History     Socioeconomic History    Marital status:    Tobacco Use    Smoking status: Former     Current packs/day: 0.50     Average packs/day: 0.5 packs/day for 5.0 years (2.5 ttl pk-yrs)     Types: Cigarettes    Smokeless tobacco: Never    Tobacco comments:     QUIT AGE 19   Vaping Use    Vaping status: Never Used   Substance and Sexual Activity    Alcohol use: Never    Drug use: Never       Allergies   Allergen Reactions    Clindamycin GI Intolerance    Bactrim Ds [Sulfamethoxazole-Trimethoprim] Rash         Medication:  No current facility-administered medications for this encounter.    Antibiotics:  Anti-Infectives (From admission, onward)      None              Review of Systems:  Constitutional-- No Fever, chills or sweats.    HEENT--   No headache or sore throat  CV-- No chest pain,   Resp-- No SOB/cough  GI- No nausea, vomiting, or diarrhea  -- No dysuria, hematuria, or flank pain.   Heme- No active bruising or bleeding;   MS--  left elbow swelling and erythema.  Neuro-- No acute focal weakness or  "numbness in the arms or legs.   Skin--No rashes or lesions      Physical Exam:   Vital Signs  No data recorded.    No data recorded  No data recorded  No data recorded  No data recorded  No data recorded    GENERAL: Awake and alert, in no acute distress.   HEENT: Normocephalic, atraumatic.  PERRL. EOMI. No conjunctival injection. No icterus. Oropharynx clear without evidence of thrush or exudate.  NECK: Supple   HEART: RRR; No murmur, rubs, gallops.   LUNGS: Clear to auscultation bilaterally without wheezing, rales, rhonchi. Normal respiratory effort. Nonlabored. No dullness.  ABDOMEN: Soft, nontender, nondistended. No rebound or guarding. NO mass or HSM.  EXT:   left elbow swelling with posterior elbow erythema with nodularity and fluctuance.  :  Without Amaya catheter.  MSK: No joint effusions or erythema  SKIN: Warm and dry without cutaneous eruptions on Inspection/palpation.    NEURO: Oriented to PPT.  Motor 5/5 strength  PSYCHIATRIC: Normal insight and judgment. Cooperative with PE    Laboratory Data                                    CrCl cannot be calculated (Patient's most recent lab result is older than the maximum 30 days allowed.).      Microbiology:  No results found for: \"ACANTHNAEG\", \"AFBCX\", \"BPERTUSSISCX\", \"BLOODCX\"  No results found for: \"BCIDPCR\", \"CXREFLEX\", \"CSFCX\", \"CULTURETIS\"  No results found for: \"CULTURES\", \"HSVCX\", \"URCX\"  No results found for: \"EYECULTURE\", \"GCCX\", \"HSVCULTURE\", \"LABHSV\"  No results found for: \"LEGIONELLA\", \"MRSACX\", \"MUMPSCX\", \"MYCOPLASCX\"  No results found for: \"NOCARDIACX\", \"STOOLCX\"  No results found for: \"THROATCX\", \"UNSTIMCULT\", \"URINECX\", \"CULTURE\", \"VZVCULTUR\"  No results found for: \"VIRALCULTU\", \"WOUNDCX\"        Radiology:  Imaging Results (Last 72 Hours)       ** No results found for the last 72 hours. **              Impression:   1.  Left total elbow arthroplasty infection-scheduled for debridement with poly exchange today.  She will require at least 6 weeks " of intravenous antibiotic therapy followed by oral antibiotic suppression.  2.  Metastatic breast cancer  3.  History of Bactrim/clindamycin allergies  4.   History of MRSA axillary infection-remote    PLAN/RECOMMENDATIONS:   Thank you for asking us to see Britney Raman, I recommend the following:  --  Left total elbow arthroplasty debridement with poly exchange.  --  Left elbow cultures  --  Intravenous daptomycin  --  Intravenous Rocephin  --  PICC line placement  --  CBC, CMP, CPK, and CRP    I discussed her complex situation with Dr. Sunshine and Dr. Perales today.  I discussed her complex situation in detail with her and her  today.  She will be at high risk for persistent infection/relapse.  She will likely require 6 weeks of intravenous antibiotic therapy followed by chronic oral antibiotic suppression.  Her oral antibiotic suppression will be complicated by her potential Bactrim/clindamycin allergies.    This visit included the following complex service elements:  Complex medical decision-making associated with antimicrobial prescribing.  In-depth chart review with high level synthesis for complex diagnoses.  Managed infection treatment protocol associated with transitions of care for this complex patient.       Tavon Valentin MD  4/28/2025  10:48 EDT

## 2025-04-28 NOTE — INTERVAL H&P NOTE
[facility] Pre-op    Full history and physical note from office is attached.    BP (!) 136/103 (BP Location: Right arm, Patient Position: Sitting)   Pulse 85   Temp 97.8 °F (36.6 °C) (Temporal)   Resp 16     Review of Systems:  Constitutional-- No fever, chills or sweats. No fatigue.  CV-- No chest pain, palpitation or syncope  Resp-- No SOB, cough, hemoptysis  Skin--No rashes or lesions    Physical Exam:  Heart:   Regular rate and rhythm, S1 and S2 normal  Lungs: Clear to auscultation bilaterally, respirations unlabored    LAB Results:  Lab Results   Component Value Date    WBC 4.51 04/18/2025    HGB 8.5 (L) 04/18/2025    HCT 28.9 (L) 04/18/2025    MCV 70.1 (L) 04/18/2025     04/18/2025    NEUTROABS 3.33 04/18/2025    GLUCOSE 140 (H) 05/26/2022    BUN 10 05/26/2022    CREATININE 1.00 05/26/2022     05/26/2022    K 4.0 05/26/2022     05/26/2022    CO2 20.0 (L) 05/26/2022    CALCIUM 8.3 (L) 05/26/2022    INR 0.98 08/12/2019       Cancer Staging (if applicable)  Cancer Patient: __ yes __no __unknown__N/A; If yes, clinical stage T:__ N:__M:__, stage group or __N/A      Impression: Hx left elbow joint replacement    Plan: TOTAL ELBOW REVISION, IRRIGATION AND DEBRIDEMENT LEFT       DANIELLE Barney   4/28/2025   11:27 EDT

## 2025-04-28 NOTE — ANESTHESIA PROCEDURE NOTES
LEFT Infraclav cath      Patient reassessed immediately prior to procedure    Patient location during procedure: pre-op  Start time: 4/28/2025 12:12 PM  Reason for block: at surgeon's request and post-op pain management  Performed by  CRNA/CAA: Black Villarreal CRNA  Assisted by: Aliya Beverly RN  Preanesthetic Checklist  Completed: patient identified, IV checked, site marked, risks and benefits discussed, surgical consent, monitors and equipment checked, pre-op evaluation and timeout performed  Prep:  Pt Position: supine  Sterile barriers:cap, gloves, mask and washed/disinfected hands  Prep: ChloraPrep  Patient monitoring: blood pressure monitoring, continuous pulse oximetry and EKG  Procedure    Sedation: yes  Performed under: local infiltration  Guidance:ultrasound guided    ULTRASOUND INTERPRETATION.  Using ultrasound guidance a 20 G gauge needle was placed in close proximity to the brachial plexus nerve, at which point, under ultrasound guidance anesthetic was injected in the area of the nerve and spread of the anesthesia was seen on ultrasound in close proximity thereto.  There were no abnormalities seen on ultrasound; a digital image was taken; and the patient tolerated the procedure with no complications. Images:still images obtained, printed/placed on chart    Laterality:left  Block Type:infraclavicular  Injection Technique:catheter  Needle Type:Tuohy and echogenic  Needle Gauge:18 G  Resistance on Injection: none  Catheter Size:20 G  Cath Depth at skin: 7 cm    Medications Used: fentaNYL citrate (PF) (SUBLIMAZE) injection - Intravenous   100 mcg - 4/28/2025 12:12:00 PM  bupivacaine PF (MARCAINE) 0.25 % injection - Injection   30 mL - 4/28/2025 12:12:00 PM      Post Assessment  Injection Assessment: negative aspiration for heme, no paresthesia on injection and incremental injection  Patient Tolerance:comfortable throughout block  Complications:no  Additional Notes  CATHETER  The affected upper extremity was  "abducted and rotated 90 degrees at the shoulder, within the patients range of motion. A high-frequency linear transducer, with sterile cover, was placed just medial to the coracoid process, distal third of the clavicle, and inferior to the clavicle. Keeping the transducer is in a parasagittal plane (cephalad to caudad), scanning medially to laterally. The Pectoralis major and minor, brachial plexus, axillary artery and vein, rib and pleura where visualized. The insertion site was prepped and draped in sterile fashion. Skin and cutaneous tissue was infiltrated with 2-5 ml of 1% Lidocaine. Using ultrasound-guidance, an 18-gauge Contiplex Ultra 360 Touhy needle was advanced in plane lateral to the axillary artery and lateral cord of the brachial plexus. Preservative-free normal saline was utilized for hydro-dissection of tissue, advancement of Touhy needle, and to confirm final needle placement posterior to the axillary artery and posterior cord of the brachial plexus. Local anesthetic injection spread, in incremental 3-5 ml injections, was confirmed. Aspiration every 5 ml to prevent intravascular injection. Injection was completed with negative aspiration of blood and negative intravascular injection. Injection pressures were normal with minimal resistance. A 20-gauge Contiplex Echo catheter was placed through the needle and advance out the tip of the Touhy 1-3 cm. The Touhy needle was then removed, and final catheter position verified at the 5-6 o'clock position to the axillary artery and posterior cord. The catheter was secured in usual fashion with skin glue, benzoin, steri-strips, CHG tegaderm and Label noting \"Nerve Block Catheter\". Jerk tape applied at yellow connector and catheter connection.    Performed by: Black Villarreal, ZAFAR            "

## 2025-04-28 NOTE — OP NOTE
DATE OF OPERATION: 04/28/25    PREOPERATIVE DIAGNOSIS: Infection associated with other internal joint prosthesis, initial encounter [[id number]]       POSTOPERATIVE DIAGNOSES:  1. 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 Revision left total elbow arthroplasty.     SURGEON: Juan David Sunshine MD      ASSISTANT: Salbador Rueda MD, PGY-5  Bentley White DO, PGY-6     ANESTHESIA: General plus block.     SURGICAL APPROACH: Lateral paraolecranon              ESTIMATED BLOOD LOSS: Minimal.      COMPLICATIONS: None.      DISPOSITION: To the recovery room in stable condition.       TOURNIQUET TIME: 110 minutes at 250 mmHg.     IMPLANT: Adam Coonrad-Morrey prosthesis, bushing exchange     INDICATIONS: This is a 64-year-old female who underwent a left total elbow arthroplasty for pathologic distal humerus fracture due to metastatic breast cancer.  She was doing well until a few weeks ago when she began developing swelling and erythema about her elbow.  She presented to the office with inflammatory findings and concern for septic arthritis.  CT scan showed significant fluid.  Risks and benefits of surgery as well as alternatives were discussed with plan for washout and bushing exchange with antibiotic suppression given her very well-fixed implants and comorbidities.  She understood and wished to proceed.     DESCRIPTION OF PROCEDURE: On the day of surgery, the patient identified the left elbow as the correct operative extremity. This was initialed by the surgeon with the patient's acknowledgment. The patient then underwent placement of a regional block and was taken to the operating room and placed in the supine position. On induction of adequate anesthesia, the left upper extremity was prepped and draped in the usual sterile fashion. A sterile tourniquet was applied. Timeout confirmed the correct patient and operative extremity, and that antibiotics were on  board. The arm was elevated to exsanguinate and the tourniquet inflated to 250 mmHg.  Her previous posterior incision was made around the elbow and was carried sharply through the skin and subcutaneous tissue. Medial and lateral flaps were developed over the triceps fascia.  There was a rent in the triceps medially and copious purulent material reported forth.  Multiple soft tissue samples were obtained for tissue cultures.  Blunt dissection was used to fully evacuate the purulent material and necrotic tissue was debrided using a rongeur.  The ulnar nerve had previously been transposed and was not identified.  A pair olecranon triceps opening was made laterally and release was carried out medially.  Capsular release was carried out directly off the humerus both from the medial and lateral sides and a nonunion of her medial condyle fracture was excised as it was contributing to blocking flexion.  The joint was thoroughly debrided and no further pockets of infection could be identified.  The locking pin was removed and the polyethylene was also removed.  The joint was copiously irrigated with saline mixed with Betadine as well as plain saline using a Pulsavac irrigation.  Flexion is significantly improved to 130 degrees and extension was improved to approximately 30 degrees.  New locking pin in bushing was inserted and secured with a click.  Range of motion was secure and stable.  The para tricipital window was closed with 0 PDS, the subcutaneous tissue with 2-0 PDS and the skin with nylon suture. A sterile dressing was placed. The patient was placed into a Zapata dressing and the tourniquet was released. Anesthesia was reversed and the patient was taken to the recovery room in stable condition. All instrument, needle and sponge counts were correct. Therapy will see the patient tomorrow and we will begin gentle range of motion. She will be discharged home when comfortable and antibiotics arranged. All instrument,  needle and sponge counts were correct.         Juan David Sunshine MD

## 2025-04-29 PROBLEM — Z96.619 INFECTION OF PROSTHETIC SHOULDER JOINT: Status: ACTIVE | Noted: 2025-04-29

## 2025-04-29 PROBLEM — T84.59XA INFECTION OF PROSTHETIC SHOULDER JOINT: Status: ACTIVE | Noted: 2025-04-29

## 2025-04-29 LAB
ALBUMIN SERPL-MCNC: 3.8 G/DL (ref 3.5–5.2)
ALBUMIN/GLOB SERPL: 1.5 G/DL
ALP SERPL-CCNC: 60 U/L (ref 39–117)
ALT SERPL W P-5'-P-CCNC: 8 U/L (ref 1–33)
ANION GAP SERPL CALCULATED.3IONS-SCNC: 18 MMOL/L (ref 5–15)
AST SERPL-CCNC: 18 U/L (ref 1–32)
BILIRUB SERPL-MCNC: 0.2 MG/DL (ref 0–1.2)
BUN SERPL-MCNC: 17 MG/DL (ref 8–23)
BUN/CREAT SERPL: 9.2 (ref 7–25)
CALCIUM SPEC-SCNC: 8.2 MG/DL (ref 8.6–10.5)
CHLORIDE SERPL-SCNC: 98 MMOL/L (ref 98–107)
CK SERPL-CCNC: 76 U/L (ref 20–180)
CO2 SERPL-SCNC: 16 MMOL/L (ref 22–29)
CREAT SERPL-MCNC: 1.85 MG/DL (ref 0.57–1)
CRP SERPL-MCNC: 5.28 MG/DL (ref 0–0.5)
EGFRCR SERPLBLD CKD-EPI 2021: 30.3 ML/MIN/1.73
GLOBULIN UR ELPH-MCNC: 2.6 GM/DL
GLUCOSE SERPL-MCNC: 160 MG/DL (ref 65–99)
POTASSIUM SERPL-SCNC: 3.6 MMOL/L (ref 3.5–5.2)
PROT SERPL-MCNC: 6.4 G/DL (ref 6–8.5)
QT INTERVAL: 406 MS
QTC INTERVAL: 496 MS
SODIUM SERPL-SCNC: 132 MMOL/L (ref 136–145)

## 2025-04-29 PROCEDURE — 80053 COMPREHEN METABOLIC PANEL: CPT | Performed by: INTERNAL MEDICINE

## 2025-04-29 PROCEDURE — 86140 C-REACTIVE PROTEIN: CPT | Performed by: INTERNAL MEDICINE

## 2025-04-29 PROCEDURE — 97535 SELF CARE MNGMENT TRAINING: CPT

## 2025-04-29 PROCEDURE — 97110 THERAPEUTIC EXERCISES: CPT

## 2025-04-29 PROCEDURE — 25010000002 CEFTRIAXONE PER 250 MG: Performed by: INTERNAL MEDICINE

## 2025-04-29 PROCEDURE — 97165 OT EVAL LOW COMPLEX 30 MIN: CPT

## 2025-04-29 PROCEDURE — 97162 PT EVAL MOD COMPLEX 30 MIN: CPT

## 2025-04-29 PROCEDURE — 82550 ASSAY OF CK (CPK): CPT | Performed by: INTERNAL MEDICINE

## 2025-04-29 PROCEDURE — 63710000001 SODIUM BICARBONATE 650 MG TABLET: Performed by: INTERNAL MEDICINE

## 2025-04-29 PROCEDURE — 25810000003 SODIUM CHLORIDE 0.9 % SOLUTION: Performed by: INTERNAL MEDICINE

## 2025-04-29 PROCEDURE — 63710000001 CITALOPRAM 40 MG TABLET: Performed by: INTERNAL MEDICINE

## 2025-04-29 PROCEDURE — 63710000001 METOPROLOL SUCCINATE XL 100 MG TABLET SUSTAINED-RELEASE 24 HOUR: Performed by: INTERNAL MEDICINE

## 2025-04-29 PROCEDURE — 25010000002 DAPTOMYCIN PER 1 MG: Performed by: INTERNAL MEDICINE

## 2025-04-29 PROCEDURE — A9270 NON-COVERED ITEM OR SERVICE: HCPCS | Performed by: INTERNAL MEDICINE

## 2025-04-29 RX ORDER — OXYCODONE HYDROCHLORIDE 5 MG/1
5 TABLET ORAL EVERY 4 HOURS PRN
Qty: 25 TABLET | Refills: 0 | Status: SHIPPED | OUTPATIENT
Start: 2025-04-29

## 2025-04-29 RX ORDER — EVEROLIMUS 5 MG/1
5 TABLET ORAL DAILY
Status: DISCONTINUED | OUTPATIENT
Start: 2025-04-29 | End: 2025-05-01 | Stop reason: HOSPADM

## 2025-04-29 RX ORDER — SODIUM BICARBONATE 650 MG/1
1300 TABLET ORAL 2 TIMES DAILY
Status: DISCONTINUED | OUTPATIENT
Start: 2025-04-29 | End: 2025-05-01 | Stop reason: HOSPADM

## 2025-04-29 RX ORDER — EXEMESTANE 25 MG/1
25 TABLET ORAL DAILY
Status: DISCONTINUED | OUTPATIENT
Start: 2025-04-29 | End: 2025-05-01 | Stop reason: HOSPADM

## 2025-04-29 RX ORDER — OXYCODONE HYDROCHLORIDE 10 MG/1
TABLET, COATED ORAL
COMMUNITY

## 2025-04-29 RX ORDER — SODIUM CHLORIDE 9 MG/ML
100 INJECTION, SOLUTION INTRAVENOUS CONTINUOUS
Status: DISCONTINUED | OUTPATIENT
Start: 2025-04-29 | End: 2025-05-01 | Stop reason: HOSPADM

## 2025-04-29 RX ADMIN — EXEMESTANE 25 MG: 25 TABLET ORAL at 14:03

## 2025-04-29 RX ADMIN — EVEROLIMUS 5 MG: 5 TABLET ORAL at 14:03

## 2025-04-29 RX ADMIN — SODIUM BICARBONATE 650 MG TABLET 1300 MG: at 23:01

## 2025-04-29 RX ADMIN — DAPTOMYCIN 500 MG: 500 INJECTION, POWDER, LYOPHILIZED, FOR SOLUTION INTRAVENOUS at 01:07

## 2025-04-29 RX ADMIN — CEFTRIAXONE 2000 MG: 2 INJECTION, POWDER, FOR SOLUTION INTRAMUSCULAR; INTRAVENOUS at 23:02

## 2025-04-29 RX ADMIN — CITALOPRAM HYDROBROMIDE 40 MG: 40 TABLET ORAL at 09:08

## 2025-04-29 RX ADMIN — Medication 10 ML: at 23:01

## 2025-04-29 RX ADMIN — METOPROLOL SUCCINATE 100 MG: 100 TABLET, EXTENDED RELEASE ORAL at 09:08

## 2025-04-29 RX ADMIN — SODIUM CHLORIDE 100 ML/HR: 9 INJECTION, SOLUTION INTRAVENOUS at 16:03

## 2025-04-29 NOTE — THERAPY EVALUATION
Patient Name: Britney Raman  : 1960    MRN: [id number]                              Today's Date: 2025       Admit Date: 2025    Visit Dx:     ICD-10-CM ICD-9-CM   1. Infection of prosthetic shoulder joint, initial encounter  T84.59XA 996.66    Z96.619 V43.61   2. 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, initial encounter    Infection of prosthetic shoulder joint     Past Medical History:   Diagnosis Date    Arthritis     Breast cancer 2018    Left - had mastectomy and subsequent chemo and radiation - new diagnosis () of right breast CA and mets to spine    Cataract     MILD    GERD (gastroesophageal reflux disease)     Hx MRSA infection 2018    right arm x 2 and right leg; most recent treatment 2018    Knee pain     LEFT MAINLY    PONV (postoperative nausea and vomiting)     AFTER BREAST SURGERY- HAD WATER AND THREW IT UP IMMEDIATELY    Urinary incontinence in female     UTI (urinary tract infection)     Wears glasses      Past Surgical History:   Procedure Laterality Date    BREAST BIOPSY Bilateral     DIAGNOSTIC LAPAROSCOPY      ENDOSCOPY      EYE SURGERY Bilateral     AS CHILD    INJECTION OF MEDICATION      X7 LEFT KNEE    MASTECTOMY Left 2018    PORTACATH PLACEMENT      TOTAL ELBOW ARTHROPLASTY Left 2022    Procedure: TOTAL ELBOW ARTHROPLASTY FOR FRACTURE,  ULNAR NERVE TRANSPOSITION;  Surgeon: Juan David Sunshine MD;  Location: [facility] OR;  Service: Orthopedics;  Laterality: Left;    TOTAL ELBOW ARTHROPLASTY Left 2025    Procedure: TOTAL ELBOW REVISION, IRRIGATION AND DEBRIDEMENT LEFT;  Surgeon: Juan David Sunshine MD;  Location: [facility] OR;  Service: Orthopedics;   Laterality: Left;    TUBAL ABDOMINAL LIGATION        General Information       Row Name 04/29/25 0902          OT Time and Intention    Document Type evaluation  -     Mode of Treatment occupational therapy  -     Patient Effort good  -       Row Name 04/29/25 0902          General Information    Patient Profile Reviewed yes  -     Prior Level of Function independent:;feeding;grooming;dressing;transfer;all household mobility;min assist:;bathing  -     Existing Precautions/Restrictions fall;other (see comments)  Infraclavicular catheter, L UE NWB  -     Barriers to Rehab medically complex;previous functional deficit  -       Row Name 04/29/25 0902          Living Environment    Current Living Arrangements home  -     People in Home spouse  -       Row Name 04/29/25 0902          Home Main Entrance    Number of Stairs, Main Entrance none  -       Row Name 04/29/25 0902          Stairs Within Home, Primary    Number of Stairs, Within Home, Primary none  -       Row Name 04/29/25 0902          Cognition    Orientation Status (Cognition) oriented x 3  -       Row Name 04/29/25 0902          Safety Issues/Impairments Affecting Functional Mobility    Safety Issues Affecting Function (Mobility) awareness of need for assistance;insight into deficits/self-awareness;sequencing abilities  -     Impairments Affecting Function (Mobility) endurance/activity tolerance;balance;postural/trunk control;strength;sensation/sensory awareness  -               User Key  (r) = Recorded By, (t) = Taken By, (c) = Cosigned By      Initials Name Provider Type     Sita Sandra OT Occupational Therapist                     Mobility/ADL's       Row Name 04/29/25 0909          Bed Mobility    Bed Mobility scooting/bridging;supine-sit  -     Scooting/Bridging [place] (Bed Mobility) minimum assist (75% patient effort);verbal cues  -     Supine-Sit [place] (Bed Mobility) minimum assist (75% patient  effort);verbal cues  -[facility] Name 04/29/25 0909          Transfers    Transfers sit-stand transfer;bed-chair transfer;toilet transfer  -     Comment, (Transfers) VC's for R hand placement and sequencing. Educated pt. on infraclavicular catheter mgmt to prevent dislodgement.  -       Row Name 04/29/25 0909          Bed-Chair Transfer    Bed-Chair [place] (Transfers) contact guard;verbal cues  -     Assistive Device (Bed-Chair Transfers) other (see comments)  R UE support  -       Row Name 04/29/25 0909          Sit-Stand Transfer    Sit-Stand [place] (Transfers) contact guard;verbal cues  -[facility] Name 04/29/25 09          Toilet Transfer    Type (Toilet Transfer) sit-stand;stand-sit  -     [place] Level (Toilet Transfer) contact guard  -     Assistive Device (Toilet Transfer) commode;grab bars/safety frame  -LC       Row Name 04/29/25 0909          Functional Mobility    Functional Mobility- Ind. Level contact guard assist;verbal cues required  -     Functional Mobility-Distance (Feet) 30  -     Functional Mobility- Comment Ambulated in room to simulate household distances needed for ADL participation. CGA for safety  -[facility] Name 04/29/25 0909          Activities of Daily Living    BADL Assessment/Intervention bathing;grooming;toileting;upper body dressing  -[facility] Name 04/29/25 0909          Hygiene Care    Oral Care teeth brushed - regular toothbrush  -LC       Row Name 04/29/25 0909          Mobility    Extremity Weight-bearing Status left upper extremity  -     Left Upper Extremity (Weight-bearing Status) non weight-bearing (NWB)  -LC       Row Name 04/29/25 0909          Bathing Assessment/Intervention    Comment, (Bathing) educated pt. to not shower until infraclavicular nerve catheter is discontinued and cleared with MD. Verbalized understanding.  -[facility] Name 04/29/25 0909          Grooming Assessment/Training    [place] Level (Grooming)  hair care, combing/brushing;oral care regimen;wash face, hands;minimum assist (75% patient effort)  -     Position (Grooming) unsupported standing;sink side  -[facility] Name 04/29/25 0909          Toileting Assessment/Training    [place] Level (Toileting) adjust/manage clothing;perform perineal hygiene;contact guard assist  -     Assistive Devices (Toileting) commode;grab bar/safety frame  -     Position (Toileting) unsupported sitting;unsupported standing  -[facility] Name 04/29/25 0909          Upper Body Dressing Assessment/Training    [place] Level (Upper Body Dressing) maximum assist (25% patient effort);verbal cues  -     Position (Upper Body Dressing) unsupported sitting  -     Comment, (Upper Body Dressing) Educated pt. on brenda dressing technique, sling mgmt, and infraclavicular mgmt. to prevent dislodgement. Verbalized understanding. Don/Doffed to improve fit and positioning of L UE  -LC               User Key  (r) = Recorded By, (t) = Taken By, (c) = Cosigned By      Initials Name Provider Type     Sita Sandra OT Occupational Therapist                   Obj/Interventions       [facility] Name 04/29/25 1004          Sensory Assessment (Somatosensory)    Sensory Assessment (Somatosensory) left UE;right UE  -     Left UE Sensory Assessment general sensation;impaired  Numb from infraclavicular catheter  -     Right UE Sensory Assessment general sensation;intact  -[facility] Name 04/29/25 1004          Vision Assessment/Intervention    Visual Impairment/Limitations WFL  -[facility] Name 04/29/25 1004          Range of Motion Comprehensive    Comment, General Range of Motion R UE WFL, L UE deferred  -[facility] Name 04/29/25 1004          Strength Comprehensive (MMT)    Comment, General Manual Muscle Testing (MMT) Assessment R UE grossly 4/5, L UE deferred  -[facility] Name 04/29/25 1004          Elbow/Forearm (Therapeutic Exercise)    Elbow/Forearm (Therapeutic Exercise) PROM  (passive range of motion)  -     Elbow/Forearm PROM (Therapeutic Exercise) left;flexion;extension;10 repetitions  -       Row Name 04/29/25 1004          Wrist (Therapeutic Exercise)    Wrist (Therapeutic Exercise) PROM (passive range of motion)  -     Wrist PROM (Therapeutic Exercise) left;flexion;extension  -[facility] Name 04/29/25 1004          Hand (Therapeutic Exercise)    Hand (Therapeutic Exercise) PROM (passive range of motion)  -     Hand PROM (Therapeutic Exercise) left;finger flexion;finger extension;10 repetitions  -[facility] Name 04/29/25 1004          Motor Skills    Motor Skills functional endurance  -     Functional Endurance impaired activity tolerance  -     Therapeutic Exercise elbow/forearm;wrist;hand  Per MD parameters, L UE gentle AROM/PROM as tolerated  -[facility] Name 04/29/25 1004          Balance    Balance Assessment sitting static balance;sitting dynamic balance;standing static balance;standing dynamic balance  -     Static Sitting Balance standby assist  -     Dynamic Sitting Balance standby assist  -     Position, Sitting Balance unsupported;sitting in chair  -     Static Standing Balance contact guard  -     Dynamic Standing Balance contact guard;verbal cues  -     Position/Device Used, Standing Balance supported;other (see comments)  R UE support  -     Balance Interventions sitting;standing;sit to stand;supported;occupation based/functional task;weight shifting activity  -     Comment, Balance CGA to steady  -               User Key  (r) = Recorded By, (t) = Taken By, (c) = Cosigned By      Initials Name Provider Type     Sita Sandra OT Occupational Therapist                   Goals/Plan       [facility] Name 04/29/25 1010          Transfer Goal 1 (OT)    Activity/Assistive Device (Transfer Goal 1, OT) sit-to-stand/stand-to-sit;bed-to-chair/chair-to-bed;toilet  -     [place] Level/Cues Needed (Transfer Goal 1, OT) standby assist  -      Time Frame (Transfer Goal 1, OT) long term goal (LTG);5 days  -LC     Progress/Outcome (Transfer Goal 1, OT) goal ongoing  -       Row Name 04/29/25 1010          Dressing Goal 1 (OT)    Activity/Device (Dressing Goal 1, OT) upper body dressing  -     [place]/Cues Needed (Dressing Goal 1, OT) moderate assist (50-74% patient effort);verbal cues required  -     Time Frame (Dressing Goal 1, OT) short term goal (STG);3 days  -LC     Progress/Outcome (Dressing Goal 1, OT) goal ongoing  -[facility] Name 04/29/25 1010          Toileting Goal 1 (OT)    Activity/Device (Toileting Goal 1, OT) adjust/manage clothing;perform perineal hygiene;commode;grab bar/safety frame  -     [place] Level/Cues Needed (Toileting Goal 1, OT) standby assist  -     Time Frame (Toileting Goal 1, OT) long term goal (LTG);5 days  -LC     Progress/Outcome (Toileting Goal 1, OT) goal ongoing  -[facility] Name 04/29/25 1010          Therapy Assessment/Plan (OT)    Planned Therapy Interventions (OT) activity tolerance training;adaptive equipment training;BADL retraining;functional balance retraining;occupation/activity based interventions;patient/caregiver education/training;strengthening exercise;transfer/mobility retraining  -               User Key  (r) = Recorded By, (t) = Taken By, (c) = Cosigned By      Initials Name Provider Type     Sita Sandra, OT Occupational Therapist                   Clinical Impression       [facility] Name 04/29/25 1007          Pain Assessment    Pretreatment Pain Rating 0/10 - no pain  -     Posttreatment Pain Rating 0/10 - no pain  -LC       Row Name 04/29/25 1007          Plan of Care Review    Plan of Care Reviewed With patient  -     Progress no change  -     Outcome Evaluation Pt. educated on brenda dressing technique for UBD, Sling mgmt, and infraclavicular nerve catheter mgmt during ADLs and functional mobility. Family present for education. Demonstates bed mobility with Min A and  T/Fs with CGA. Completes toileting with CGA, UBD with Max A, and grooming with Min A. Skilled OT services warranted to promote return to PLOF. Recommend home with 24/7 assist at discharge.  -       Row Name 04/29/25 1007          Therapy Assessment/Plan (OT)    Therapy Frequency (OT) daily  -     Predicted Duration of Therapy Intervention (OT) 3 days  -       Row Name 04/29/25 1007          Therapy Plan Review/Discharge Plan (OT)    Anticipated Discharge Disposition (OT) home with 24/7 care  -       Row Name 04/29/25 1007          Positioning and Restraints    Pre-Treatment Position in bed  -LC     Post Treatment Position chair  -LC     In Chair notified nsg;reclined;call light within reach;encouraged to call for assist;with PT;waffle cushion;legs elevated  -               User Key  (r) = Recorded By, (t) = Taken By, (c) = Cosigned By      Initials Name Provider Type     Sita Sandra, OT Occupational Therapist                   Outcome Measures       Row Name 04/29/25 1010          How much help from another is currently needed...    Putting on and taking off regular lower body clothing? 2  -LC     Bathing (including washing, rinsing, and drying) 2  -LC     Toileting (which includes using toilet bed pan or urinal) 3  -LC     Putting on and taking off regular upper body clothing 2  -LC     Taking care of personal grooming (such as brushing teeth) 3  -LC     Eating meals 3  -LC     AM-PAC 6 Clicks Score (OT) 15  -       Row Name 04/29/25 0944          How much help from another person do you currently need...    Turning from your back to your side while in flat bed without using bedrails? 4  -LH     Moving from lying on back to sitting on the side of a flat bed without bedrails? 3  -LH     Moving to and from a bed to a chair (including a wheelchair)? 3  -LH     Standing up from a chair using your arms (e.g., wheelchair, bedside chair)? 3  -LH     Climbing 3-5 steps with a railing? 3  -LH     To walk in  hospital room? 3  -     AM-PAC 6 Clicks Score (PT) 19  -     Highest Level of Mobility Goal 6 --> Walk 10 steps or more  -       Row Name 04/29/25 1010 04/29/25 0944       Functional Assessment    Outcome Measure Options AM-PAC 6 Clicks Daily Activity (OT)  - AM-PAC 6 Clicks Basic Mobility (PT)  -              User Key  (r) = Recorded By, (t) = Taken By, (c) = Cosigned By      Initials Name Provider Type    LC Sita Sandra, OT Occupational Therapist     Carole Ruggiero, PT Physical Therapist                    Occupational Therapy Education       Title: PT OT SLP Therapies (In Progress)       Topic: Occupational Therapy (In Progress)       Point: ADL training (In Progress)       Learning Progress Summary            Patient Acceptance, E, NR by  at 4/29/2025 0802                      Point: Home exercise program (In Progress)       Learning Progress Summary            Patient Acceptance, E, NR by  at 4/29/2025 0802                      Point: Precautions (In Progress)       Learning Progress Summary            Patient Acceptance, E, NR by  at 4/29/2025 0802                      Point: Body mechanics (In Progress)       Learning Progress Summary            Patient Acceptance, E, NR by  at 4/29/2025 0802                                      User Key       Initials Effective Dates Name Provider Type Discipline     06/16/21 -  Sita Sandra, OT Occupational Therapist OT                  OT Recommendation and Plan  Planned Therapy Interventions (OT): activity tolerance training, adaptive equipment training, BADL retraining, functional balance retraining, occupation/activity based interventions, patient/caregiver education/training, strengthening exercise, transfer/mobility retraining  Therapy Frequency (OT): daily  Plan of Care Review  Plan of Care Reviewed With: patient  Progress: no change  Outcome Evaluation: Pt. educated on brenda dressing technique for UBD, Sling mgmt, and infraclavicular nerve  catheter mgmt during ADLs and functional mobility. Family present for education. Demonstates bed mobility with Min A and T/Fs with CGA. Completes toileting with CGA, UBD with Max A, and grooming with Min A. Skilled OT services warranted to promote return to PLOF. Recommend home with 24/7 assist at discharge.     Time Calculation:   Evaluation Complexity (OT)  Review Occupational Profile/Medical/Therapy History Complexity: brief/low complexity  Assessment, Occupational Performance/Identification of Deficit Complexity: 1-3 performance deficits  Clinical Decision Making Complexity (OT): problem focused assessment/low complexity  Overall Complexity of Evaluation (OT): low complexity     Time Calculation- OT       Row Name 04/29/25 1013             Time Calculation- OT    OT Start Time 0802  -      OT Received On 04/29/25  -      OT Goal Re-Cert Due Date 05/09/25  -         Timed Charges    23409 - OT Therapeutic Exercise Minutes 9  -LC      12466 - OT Self Care/Mgmt Minutes 15  -LC         Untimed Charges    OT Eval/Re-eval Minutes 46  -LC         Total Minutes    Timed Charges Total Minutes 24  -LC      Untimed Charges Total Minutes 46  -LC       Total Minutes 70  -LC                User Key  (r) = Recorded By, (t) = Taken By, (c) = Cosigned By      Initials Name Provider Type     Sita Sandra OT Occupational Therapist                  Therapy Charges for Today       Code Description Service Date Service Provider Modifiers Qty    16542444381 HC OT THER PROC EA 15 MIN 4/29/2025 Sita Sandra, OT GO 1    26524151347 HC OT SELF CARE/MGMT/TRAIN EA 15 MIN 4/29/2025 Sita Sandra OT GO 1    30806753785 HC OT EVAL LOW COMPLEXITY 4 4/29/2025 Sita Sandra, OT GO 1    18443848698 HC OT THER SUPP EA 15 MIN 4/29/2025 Sita Sandra OT GO 3                 Sita Sandra OT  4/29/2025

## 2025-04-29 NOTE — PROGRESS NOTES
INFECTIOUS DISEASE Progress Note    Britney Raman  1960  [id number]      Admission Date: 4/28/2025      Requesting Provider: Juan David Sunshine MD  Evaluating Physician: Tavon Valentin MD    Reason for Consultation:  Left total elbow arthroplasty infection    History of present illness:    4/28/25: Patient is a 64 y.o. female  with metastatic breast cancer who is seen today for evaluation of a left total elbow arthroplasty infection.  She has a history of bilateral breast cancer with metastatic disease to her liver, spine, and lung.  She developed a pathologic distal humerus fracture and underwent left total elbow arthroplasty on 5/25/2022.   Approximately 2-3 months ago she noted the onset of slowly increasing left elbow pain.  2 weeks ago she noted the onset of left elbow swelling with worsening erythema.   CT scan revealed significant elbow fluid.  She has not been on antibiotic therapy and did  not undergo elbow aspiration.   She is scheduled for left elbow debridement with poly exchange today.  She denies fever, chills, sweats.  She has a history of prior MRSA axillary infection.  She states that she is allergic to clindamycin and Bactrim after developing a rash while on both of these medications.  She is uncertain  which antibiotic caused the rash.    she has received perioperative vancomycin and cefazolin prophylaxis.  I saw her in the preoperative area.  At the time of her surgery she was found to have purulent material with necrotic tissue.    Laboratory studies from 4/18 revealed a C-reactive protein of 14.5, sedimentation of 74, and white blood cell count of 4.5.     4/29/25:    She has remained afebrile.  Left elbow fluid Gram stain revealed few white blood cells and few gram-positive cocci in pairs and clusters.   Left elbow cultures are no growth so far.   She has a right-sided Port-A-Cath in place.   Her creatinine today is 1.85 with an  EGFR of 30.      Past Medical History:    Diagnosis Date    Arthritis     Breast cancer 2018    Left - had mastectomy and subsequent chemo and radiation - new diagnosis (2022) of right breast CA and mets to spine    Cataract     MILD    GERD (gastroesophageal reflux disease)     Hx MRSA infection 2018    right arm x 2 and right leg; most recent treatment 2018    Knee pain     LEFT MAINLY    PONV (postoperative nausea and vomiting)     AFTER BREAST SURGERY- HAD WATER AND THREW IT UP IMMEDIATELY    Urinary incontinence in female     UTI (urinary tract infection)     Wears glasses        Past Surgical History:   Procedure Laterality Date    BREAST BIOPSY Bilateral     DIAGNOSTIC LAPAROSCOPY      ENDOSCOPY      EYE SURGERY Bilateral     AS CHILD    INJECTION OF MEDICATION      X7 LEFT KNEE    MASTECTOMY Left 2018    PORTACATH PLACEMENT      TOTAL ELBOW ARTHROPLASTY Left 5/25/2022    Procedure: TOTAL ELBOW ARTHROPLASTY FOR FRACTURE,  ULNAR NERVE TRANSPOSITION;  Surgeon: Juan David Sunshine MD;  Location: [facility];  Service: Orthopedics;  Laterality: Left;    TUBAL ABDOMINAL LIGATION         Family History   Problem Relation Age of Onset    Heart disease Mother     Heart disease Father        Social History     Socioeconomic History    Marital status:    Tobacco Use    Smoking status: Former     Current packs/day: 0.50     Average packs/day: 0.5 packs/day for 5.0 years (2.5 ttl pk-yrs)     Types: Cigarettes    Smokeless tobacco: Never    Tobacco comments:     QUIT AGE 19   Vaping Use    Vaping status: Never Used   Substance and Sexual Activity    Alcohol use: Never    Drug use: Never       Allergies   Allergen Reactions    Clindamycin GI Intolerance    Bactrim Ds [Sulfamethoxazole-Trimethoprim] Rash         Medication:    Current Facility-Administered Medications:     cefTRIAXone (ROCEPHIN) 2,000 mg in sodium chloride 0.9 % 100 mL MBP, 2,000 mg, Intravenous, Q24H, Tavon Valentin MD, Last Rate: 200 mL/hr at 04/28/25 2333, 2,000 mg at 04/28/25  2333    citalopram (CeleXA) tablet 40 mg, 40 mg, Oral, Daily, Freeman Perales MD    DAPTOmycin (CUBICIN) 500 mg in sodium chloride 0.9 % 50 mL IVPB, 8 mg/kg, Intravenous, Q24H, Tavon Valentin MD, Last Rate: 100 mL/hr at 04/29/25 0107, 500 mg at 04/29/25 0107    HYDROmorphone (DILAUDID) injection 0.5 mg, 0.5 mg, Intravenous, Q2H PRN **AND** naloxone (NARCAN) injection 0.1 mg, 0.1 mg, Intravenous, Q5 Min PRN, Juan David Sunshine MD    labetalol (NORMODYNE,TRANDATE) injection 10 mg, 10 mg, Intravenous, Q4H PRN, Freeman Perales MD    lactated ringers infusion, 9 mL/hr, Intravenous, Continuous, Caleb Murray MD, Last Rate: 9 mL/hr at 04/28/25 1328, New Bag at 04/28/25 1455    metoprolol succinate XL (TOPROL-XL) 24 hr tablet 100 mg, 100 mg, Oral, Daily, Freeman Perales MD    ropivacaine (NAROPIN) 0.2 % infusion (INFUSYSTEM), , Peripheral Nerve, Continuous, Black Villarreal CRNA, 1,000 mg at 04/28/25 1557    sodium chloride 0.9 % bolus 500 mL, 500 mL, Intravenous, TID PRN, Freeman Perales MD    sodium chloride 0.9 % flush 10 mL, 10 mL, Intravenous, Q12H, Juan David Sunshine MD, 10 mL at 04/28/25 2124    sodium chloride 0.9 % flush 10 mL, 10 mL, Intravenous, PRN, Juan David Sunshine MD    sodium chloride 0.9 % infusion 40 mL, 40 mL, Intravenous, PRN, Juan Daivd Sunshine MD    sodium chloride 0.9 % infusion, 9 mL/hr, Intravenous, Continuous, Juan David Sunshine MD, Last Rate: 9 mL/hr at 04/28/25 1319, 9 mL/hr at 04/28/25 1319    Antibiotics:  Anti-Infectives (From admission, onward)      Ordered     Dose/Rate Route Frequency Start Stop    04/28/25 2152  DAPTOmycin (CUBICIN) 500 mg in sodium chloride 0.9 % 50 mL IVPB        Ordering Provider: Tavon Valentin MD    8 mg/kg × 63.5 kg  100 mL/hr over 30 Minutes Intravenous Every 24 Hours 04/28/25 2300 05/28/25 2259    04/28/25 2152  cefTRIAXone (ROCEPHIN) 2,000 mg in sodium chloride 0.9 % 100 mL MBP        Ordering Provider: Tavon Valentin  "MD KENDRA    2,000 mg  200 mL/hr over 30 Minutes Intravenous Every 24 Hours 25 2300 25 2259    25 1146  vancomycin (VANCOCIN) 1,000 mg in sodium chloride 0.9 % 250 mL IVPB-VTB        Ordering Provider: Juan David Sunshine MD    15 mg/kg × 63.5 kg  250 mL/hr over 60 Minutes Intravenous Once 25 1148 25 1347    25 1123  ceFAZolin 2000 mg IVPB in 100 mL NS (MBP)        Ordering Provider: Juan David Sunshine MD    2,000 mg  over 30 Minutes Intravenous Once 25 1125 25 1311              Review of Systems:  See HPI      Physical Exam:   Vital Signs  Temp (24hrs), Av.9 °F (36.6 °C), Min:97.5 °F (36.4 °C), Max:98.7 °F (37.1 °C)    Temp  Min: 97.5 °F (36.4 °C)  Max: 98.7 °F (37.1 °C)  BP  Min: 92/73  Max: 139/78  Pulse  Min: 80  Max: 100  Resp  Min: 16  Max: 18  SpO2  Min: 97 %  Max: 100 %    GENERAL: Awake and alert, in no acute distress.   HEENT: Normocephalic, atraumatic.  PERRL. EOMI. No conjunctival injection. No icterus. Oropharynx clear without evidence of thrush or exudate.  NECK: Supple   HEART: RRR; No murmur, rubs, gallops.   LUNGS: Clear to auscultation bilaterally without wheezing, rales, rhonchi. Normal respiratory effort. Nonlabored. No dullness.  ABDOMEN: Soft, nontender, nondistended. No rebound or guarding. NO mass or HSM.  EXT: Left elbow is in a postoperative dressing  :  Without Amaya catheter.  MSK: No joint effusions or erythema  SKIN: Warm and dry without cutaneous eruptions on Inspection/palpation.    NEURO: Oriented to PPT.  Motor 5/5 strength  PSYCHIATRIC: Normal insight and judgment. Cooperative with PE    Laboratory Data                                    CrCl cannot be calculated (Patient's most recent lab result is older than the maximum 30 days allowed.).      Microbiology:  No results found for: \"ACANTHNAEG\", \"AFBCX\", \"BPERTUSSISCX\", \"BLOODCX\"  No results found for: \"BCIDPCR\", \"CXREFLEX\", \"CSFCX\", \"CULTURETIS\"  No results found for: " "\"CULTURES\", \"HSVCX\", \"URCX\"  No results found for: \"EYECULTURE\", \"GCCX\", \"HSVCULTURE\", \"LABHSV\"  No results found for: \"LEGIONELLA\", \"MRSACX\", \"MUMPSCX\", \"MYCOPLASCX\"  No results found for: \"NOCARDIACX\", \"STOOLCX\"  No results found for: \"THROATCX\", \"UNSTIMCULT\", \"URINECX\", \"CULTURE\", \"VZVCULTUR\"  No results found for: \"VIRALCULTU\", \"WOUNDCX\"        Radiology:  Imaging Results (Last 72 Hours)       Procedure Component Value Units Date/Time    XR Elbow 3+ View Left [[id number]] Collected: 04/28/25 2225     Updated: 04/28/25 2232    Narrative:      XR ELBOW 3+ VW LEFT    Date of Exam: 4/28/2025 5:12 PM EDT    Indication: postop revision TEA    Comparison: Left elbow series 5/25/2022    Findings:  Joint prosthesis remains in place.    Soft tissue air or gas is evident. Air or gas is seen in the elbow joint.    Lucency and sclerosis is seen in the distal humeral metaphysis, and in the proximal ulna.    No fractures are visualized.      Impression:      Impression:  AP and lateral views of the left elbow, as above.      Electronically Signed: Romel Nickerson MD    4/28/2025 10:29 PM EDT    Workstation ID: [id number]    IMAGING SCANNED [[id number]] Resulted: 04/28/25     Updated: 04/28/25 1256    IMAGING SCANNED [[id number]] Resulted: 04/28/25     Updated: 04/28/25 1242              Impression:   1.  Left total elbow arthroplasty infection-S/P debridement with poly exchange 4/28. She will require at least 6 weeks of intravenous antibiotic therapy followed by oral antibiotic suppression.  2.  Metastatic breast cancer  3.  History of Bactrim/clindamycin allergies  4.   History of MRSA axillary infection-remote  5.   Chronic kidney disease-versus acute kidney injury.  Her EGFR is only 30.  This is a relative contraindication for PICC line placement.  We may need to use her Port-A-Cath for vascular access with a Morales needle for ongoing intravenous antibiotic therapy.    PLAN/RECOMMENDATIONS:   --  S/P Left total elbow debridement " 4/28  --  Left elbow cultures  --  Intravenous daptomycin  --  Intravenous Rocephin  --  CBC, CMP, CPK, and CRP    She will be at high risk for persistent infection/relapse.  She will likely require 6 weeks of intravenous antibiotic therapy followed by chronic oral antibiotic suppression.  Her oral antibiotic suppression will be complicated by her potential Bactrim/clindamycin allergies.  I discussed her complex situation in detail with the patient and her  today.  I discussed her complex situation in detail with Dr. Fernandez today.  She will likely not be ready for discharge until Thursday or Friday.    This visit included the following complex service elements:  Complex medical decision-making associated with antimicrobial prescribing.  In-depth chart review with high level synthesis for complex diagnoses.  Managed infection treatment protocol associated with transitions of care for this complex patient.         Tavon Valentin MD  4/29/2025  06:59 EDT

## 2025-04-29 NOTE — PLAN OF CARE
Goal Outcome Evaluation:  Plan of Care Reviewed With: patient        Progress: no change  Outcome Evaluation: Pt. educated on brenda dressing technique for UBD, Sling mgmt, and infraclavicular nerve catheter mgmt during ADLs and functional mobility. Family present for education. Demonstates bed mobility with Min A and T/Fs with CGA. Completes toileting with CGA, UBD with Max A, and grooming with Min A. Skilled OT services warranted to promote return to PLOF. Recommend home with 24/7 assist at discharge.    Anticipated Discharge Disposition (OT): home with 24/7 care

## 2025-04-29 NOTE — THERAPY EVALUATION
Patient Name: Britney Raman  : 1960    MRN: [id number]                              Today's Date: 2025       Admit Date: 2025    Visit Dx:     ICD-10-CM ICD-9-CM   1. Infection of prosthetic shoulder joint, initial encounter  T84.59XA 996.66    Z96.619 V43.61   2. 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, initial encounter    Infection of prosthetic shoulder joint     Past Medical History:   Diagnosis Date    Arthritis     Breast cancer 2018    Left - had mastectomy and subsequent chemo and radiation - new diagnosis () of right breast CA and mets to spine    Cataract     MILD    GERD (gastroesophageal reflux disease)     Hx MRSA infection 2018    right arm x 2 and right leg; most recent treatment 2018    Knee pain     LEFT MAINLY    PONV (postoperative nausea and vomiting)     AFTER BREAST SURGERY- HAD WATER AND THREW IT UP IMMEDIATELY    Urinary incontinence in female     UTI (urinary tract infection)     Wears glasses      Past Surgical History:   Procedure Laterality Date    BREAST BIOPSY Bilateral     DIAGNOSTIC LAPAROSCOPY      ENDOSCOPY      EYE SURGERY Bilateral     AS CHILD    INJECTION OF MEDICATION      X7 LEFT KNEE    MASTECTOMY Left 2018    PORTACATH PLACEMENT      TOTAL ELBOW ARTHROPLASTY Left 2022    Procedure: TOTAL ELBOW ARTHROPLASTY FOR FRACTURE,  ULNAR NERVE TRANSPOSITION;  Surgeon: Juan David Sunshine MD;  Location: [facility];  Service: Orthopedics;  Laterality: Left;    TUBAL ABDOMINAL LIGATION        General Information       Row Name 25 0931          Physical Therapy Time and Intention    Document Type evaluation  -     Mode of Treatment physical  therapy  -[facility] Name 04/29/25 0931          General Information    Patient Profile Reviewed yes  -     Prior Level of Function independent:;all household mobility;gait;transfer;bed mobility;ADL's;min assist:;bathing  No AD used as pt unable to tolerate bearing weight through BUEs. Assist as needed for bathing, dressing, etc d/t fatigue and weakness. Endorses 2 acute falls  -     Existing Precautions/Restrictions fall;left;other (see comments)  Infraclavicular catheter, L UE NWB  -     Barriers to Rehab medically complex;previous functional deficit  -[facility] Name 04/29/25 0931          Living Environment    Current Living Arrangements home  -     People in Home spouse;other (see comments)  able to have 24/7 assist  -[facility] Name 04/29/25 0931          Home Main Entrance    Number of Stairs, Main Entrance none  -LH       Row Name 04/29/25 0931          Stairs Within Home, Primary    Number of Stairs, Within Home, Primary none  -[facility] Name 04/29/25 0931          Cognition    Orientation Status (Cognition) oriented x 3  -[facility] Name 04/29/25 0931          Safety Issues/Impairments Affecting Functional Mobility    Safety Issues Affecting Function (Mobility) awareness of need for assistance;insight into deficits/self-awareness;safety precaution awareness;safety precautions follow-through/compliance;sequencing abilities  -     Impairments Affecting Function (Mobility) endurance/activity tolerance;balance;postural/trunk control;strength;sensation/sensory awareness  -               User Key  (r) = Recorded By, (t) = Taken By, (c) = Cosigned By      Initials Name Provider Type     Carole Ruggiero, PT Physical Therapist                   Mobility       [facility] Name 04/29/25 0933          Bed Mobility    Comment, (Bed Mobility) UIC  -[facility] 04/29/25 0933          Transfers    Comment, (Transfers) VCs for hand placement and sequencing. Reviewed nerve catheter management to  prevent dislodgement  -[facility] Name 04/29/25 0933          Sit-Stand Transfer    Sit-Stand Gulf (Transfers) contact guard;verbal cues  -     Comment, (Sit-Stand Transfer) No AD used  -[facility] Name 04/29/25 0933          Gait/Stairs (Locomotion)    [place] Level (Gait) contact guard;verbal cues  -     Assistive Device (Gait) other (see comments)  R HHA  -     Patient was able to Ambulate yes  -     Distance in Feet (Gait) 130  -     Deviations/Abnormal Patterns (Gait) bilateral deviations;jenifer decreased;gait speed decreased;stride length decreased  -     Bilateral Gait Deviations forward flexed posture;heel strike decreased  -     Comment, (Gait/Stairs) Pt demo step through gait pattern w/ decreased step length and decreased jenifer. VCs for upright posture w/ forward gaze. Pt milldy unsteady w/o overt LOB. Distance limited by weakness and fatigue  -[facility] Name 04/29/25 0933          Mobility    Extremity Weight-bearing Status left upper extremity  -     Left Upper Extremity (Weight-bearing Status) non weight-bearing (NWB)  -               User Key  (r) = Recorded By, (t) = Taken By, (c) = Cosigned By      Initials Name Provider Type     Carole Ruggiero PT Physical Therapist                   Obj/Interventions       [facility] Name 04/29/25 0940          Range of Motion Comprehensive    General Range of Motion bilateral lower extremity ROM WFL  -[facility] Name 04/29/25 0940          Strength Comprehensive (MMT)    General Manual Muscle Testing (MMT) Assessment lower extremity strength deficits identified  -     Comment, General Manual Muscle Testing (MMT) Assessment BLEs grossly 4/5  -[facility] Name 04/29/25 0940          Balance    Balance Assessment sitting static balance;sitting dynamic balance;standing static balance;standing dynamic balance  -     Static Sitting Balance standby assist  -     Dynamic Sitting Balance standby assist  -     Position,  Sitting Balance unsupported;sitting in chair  -     Static Standing Balance contact guard  -     Dynamic Standing Balance contact guard;verbal cues  -     Position/Device Used, Standing Balance supported  -     Balance Interventions sitting;standing;sit to stand;supported;static;dynamic;occupation based/functional task  -     Comment, Balance mildly unsteady w/o overt LOB  -[facility] Name 04/29/25 0979          Sensory Assessment (Somatosensory)    Sensory Assessment (Somatosensory) LE sensation intact  -               User Key  (r) = Recorded By, (t) = Taken By, (c) = Cosigned By      Initials Name Provider Type     Carloe Ruggiero, PT Physical Therapist                   Goals/Plan       [facility] Name 04/29/25 0935          Bed Mobility Goal 1 (PT)    Activity/Assistive Device (Bed Mobility Goal 1, PT) bed mobility activities, all  -     [place] Level/Cues Needed (Bed Mobility Goal 1, PT) independent  -     Time Frame (Bed Mobility Goal 1, PT) long term goal (LTG);5 days  -[facility] Name 04/29/25 0952          Transfer Goal 1 (PT)    Activity/Assistive Device (Transfer Goal 1, PT) sit-to-stand/stand-to-sit;bed-to-chair/chair-to-bed  -     [place] Level/Cues Needed (Transfer Goal 1, PT) supervision required  -     Time Frame (Transfer Goal 1, PT) short term goal (STG);3 days  -[facility] Name 04/29/25 0948          Gait Training Goal 1 (PT)    Activity/Assistive Device (Gait Training Goal 1, PT) gait (walking locomotion);assistive device use  -     [place] Level (Gait Training Goal 1, PT) supervision required  -     Distance (Gait Training Goal 1, PT) 150 ft  -[facility] Name 04/29/25 0909          Therapy Assessment/Plan (PT)    Planned Therapy Interventions (PT) balance training;bed mobility training;gait training;home exercise program;neuromuscular re-education;patient/family education;postural re-education;ROM (range of motion);strengthening;stretching;transfer  training  -               User Key  (r) = Recorded By, (t) = Taken By, (c) = Cosigned By      Initials Name Provider Type     Carole Ruggiero, PT Physical Therapist                   Clinical Impression       [facility] Name 04/29/25 0941          Pain    Pretreatment Pain Rating 0/10 - no pain  -     Posttreatment Pain Rating 0/10 - no pain  -     Pain Management Interventions cold applied  -LH       Row Name 04/29/25 0941          Plan of Care Review    Plan of Care Reviewed With patient;spouse  -     Outcome Evaluation PT eval completed. Pt presents below baseline function d/t LUE NWB, generalized weakness, balance deficits, and decreased activity tolerance. Pt ambulated 130 ft w/ R HHA, CGA. She was mildly unsteady w/o overt LOB. Pt would benefit from IP PT services while hospitalized. Recommend home w/ 24/7 assist at d/c.  -[facility] 04/29/25 0941          Therapy Assessment/Plan (PT)    Patient/Family Therapy Goals Statement (PT) to go home  -     Rehab Potential (PT) good  -     Criteria for Skilled Interventions Met (PT) yes;meets criteria;skilled treatment is necessary  [facility]     Therapy Frequency (PT) daily  -     Predicted Duration of Therapy Intervention (PT) 5 days  -[facility] Name 04/29/25 0941          Positioning and Restraints    Pre-Treatment Position sitting in chair/recliner  -     Post Treatment Position chair  -     In Chair notified nsg;reclined;sitting;call light within reach;encouraged to call for assist;exit alarm on;with family/caregiver;waffle cushion;legs elevated;compression device;heels elevated;with nsg  -               User Key  (r) = Recorded By, (t) = Taken By, (c) = Cosigned By      Initials Name Provider Type     Carole Ruggiero, PT Physical Therapist                   Outcome Measures       [facility] Name 04/29/25 0944          How much help from another person do you currently need...    Turning from your back to your side while in flat bed without using  bedrails? 4  -LH     Moving from lying on back to sitting on the side of a flat bed without bedrails? 3  -LH     Moving to and from a bed to a chair (including a wheelchair)? 3  -LH     Standing up from a chair using your arms (e.g., wheelchair, bedside chair)? 3  -LH     Climbing 3-5 steps with a railing? 3  -LH     To walk in hospital room? 3  -     AM-PAC 6 Clicks Score (PT) 19  -     Highest Level of Mobility Goal 6 --> Walk 10 steps or more  -       Row Name 04/29/25 0944          Functional Assessment    Outcome Measure Options AM-PAC 6 Clicks Basic Mobility (PT)  -               User Key  (r) = Recorded By, (t) = Taken By, (c) = Cosigned By      Initials Name Provider Type     Carole Ruggiero PT Physical Therapist                                 Physical Therapy Education       Title: PT OT SLP Therapies (In Progress)       Topic: Physical Therapy (In Progress)       Point: Mobility training (Done)       Learning Progress Summary            Patient Acceptance, E, VU,NR by  at 4/29/2025 0944   Significant Other Acceptance, E, VU,NR by  at 4/29/2025 0944                      Point: Home exercise program (Not Started)       Learner Progress:  Not documented in this visit.              Point: Body mechanics (Done)       Learning Progress Summary            Patient Acceptance, E, VU,NR by  at 4/29/2025 0944   Significant Other Acceptance, E, VU,NR by  at 4/29/2025 0944                      Point: Precautions (Done)       Learning Progress Summary            Patient Acceptance, E, VU,NR by  at 4/29/2025 0944   Significant Other Acceptance, E, VU,NR by  at 4/29/2025 0944                                      User Key       Initials Effective Dates Name Provider Type Discipline     09/21/23 -  Carole Ruggiero, PT Physical Therapist PT                  PT Recommendation and Plan  Planned Therapy Interventions (PT): balance training, bed mobility training, gait training, home exercise program,  neuromuscular re-education, patient/family education, postural re-education, ROM (range of motion), strengthening, stretching, transfer training  Outcome Evaluation: PT eval completed. Pt presents below baseline function d/t LUE NWB, generalized weakness, balance deficits, and decreased activity tolerance. Pt ambulated 130 ft w/ R HHA, CGA. She was mildly unsteady w/o overt LOB. Pt would benefit from IP PT services while hospitalized. Recommend home w/ 24/7 assist at d/c.     Time Calculation:   PT Evaluation Complexity  History, PT Evaluation Complexity: 3 or more personal factors and/or comorbidities  Examination of Body Systems (PT Eval Complexity): total of 4 or more elements  Clinical Presentation (PT Evaluation Complexity): evolving  Clinical Decision Making (PT Evaluation Complexity): moderate complexity  Overall Complexity (PT Evaluation Complexity): moderate complexity     PT Charges       Row Name 04/29/25 0945             Time Calculation    Start Time 0844  -      PT Received On 04/29/25  -      PT Goal Re-Cert Due Date 05/09/25  -         Untimed Charges    PT Eval/Re-eval Minutes 46  -LH         Total Minutes    Untimed Charges Total Minutes 46  -       Total Minutes 46  -LH                User Key  (r) = Recorded By, (t) = Taken By, (c) = Cosigned By      Initials Name Provider Type     Carole Ruggiero, VIVI Physical Therapist                  Therapy Charges for Today       Code Description Service Date Service Provider Modifiers Qty    [id number]  PT EVAL MOD COMPLEXITY 4 4/29/2025 Carole Ruggiero, PT GP 1            PT G-Codes  Outcome Measure Options: AM-PAC 6 Clicks Basic Mobility (PT)  AM-PAC 6 Clicks Score (PT): 19  PT Discharge Summary  Anticipated Discharge Disposition (PT): home with 24/7 care    Carole Ruggiero, VIVI  4/29/2025

## 2025-04-29 NOTE — PLAN OF CARE
Goal Outcome Evaluation:  Plan of Care Reviewed With: patient, spouse           Outcome Evaluation: PT eval completed. Pt presents below baseline function d/t LUE NWB, generalized weakness, balance deficits, and decreased activity tolerance. Pt ambulated 130 ft w/ R HHA, CGA. She was mildly unsteady w/o overt LOB. Pt would benefit from IP PT services while hospitalized. Recommend home w/ 24/7 assist at d/c.    Anticipated Discharge Disposition (PT): home with 24/7 care

## 2025-04-29 NOTE — PROGRESS NOTES
"IM progress note      Britney Raman  [id number]  1960     LOS: 1 day     Attending: Juan David Sunshine MD    Primary Care Provider: Fatou Vera MD      Chief Complaint/Reason for visit:  No chief complaint on file.      Subjective   Feels okay with good pain control.  Participating with rehab services, no nausea, vomiting, or shortness of breath.  No fever or chills.    Objective        Visit Vitals  /70 (BP Location: Right arm, Patient Position: Lying)   Pulse 85   Temp 97 °F (36.1 °C) (Oral)   Resp 16   Ht 170.2 cm (67\")   Wt 63.5 kg (140 lb)   SpO2 98%   BMI 21.93 kg/m²     Temp (24hrs), Av.9 °F (36.6 °C), Min:97 °F (36.1 °C), Max:98.7 °F (37.1 °C)      Intake/Output:    Intake/Output Summary (Last 24 hours) at 2025 1408  Last data filed at 2025 1114  Gross per 24 hour   Intake 1610 ml   Output 1250 ml   Net 360 ml        Occupational therapy:  Sita Sandra OT   Occupational Therapist  Occupational Therapy     Plan of Care     Signed     Date of Service: 25 0802  Creation Time: 25 1013     Signed         Goal Outcome Evaluation:  Plan of Care Reviewed With: patient  Progress: no change  Outcome Evaluation: Pt. educated on brenda dressing technique for UBD, Sling mgmt, and infraclavicular nerve catheter mgmt during ADLs and functional mobility. Family present for education. Demonstates bed mobility with Min A and T/Fs with CGA. Completes toileting with CGA, UBD with Max A, and grooming with Min A. Skilled OT services warranted to promote return to PLOF. Recommend home with 24/7 assist at discharge.     Anticipated Discharge Disposition (OT): home with 24/7 care                 Physical Exam:     General Appearance:    Alert, cooperative, in no acute distress   Head:    Normocephalic, without obvious abnormality, atraumatic    Lungs:     Normal effort, symmetric chest rise,  clear to      auscultation bilaterally              Heart:    Regular rhythm and normal rate, " "normal S1 and S2    Abdomen:     Normal bowel sounds, no masses, no organomegaly, soft        nontender, nondistended, no guarding, no rebound                tenderness   Extremities: Left upper extremity in a sling.  Peripheral nerve block catheter present.  Dressing intact and clean.  Decreased movement and sensation in the hand due to block.  No clubbing, cyanosis or edema.  No deformities.    Pulses:   Pulses palpable and equal bilaterally   Skin:   No bleeding, bruising or rash          Results Review:     I reviewed the patient's new clinical results.         Invalid input(s): \"NEUTOPHILPCT\"  Results from last 7 days   Lab Units 04/29/25  0852   SODIUM mmol/L 132*   POTASSIUM mmol/L 3.6   CHLORIDE mmol/L 98   CO2 mmol/L 16.0*   BUN mg/dL 17   CREATININE mg/dL 1.85*   CALCIUM mg/dL 8.2*   BILIRUBIN mg/dL 0.2   ALK PHOS U/L 60   ALT (SGPT) U/L 8   AST (SGOT) U/L 18   GLUCOSE mg/dL 160*     I reviewed the patient's new imaging including images and reports.    All medications reviewed.   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       Plan  1.  Continue occupational therapy  2. Pain control-prns, multimodal approach including peripheral nerve block catheter.   3. IS-encouraged  4. DVT proph-mechanicals  5. Bowel regimen  6. " Monitor post-op labs, will try to obtain baseline labs from patient's PCP or oncologist  7. Discharge planning    - Hypertension:  Resume home medications as appropriate, formulary substitution when indicated.  Holding parameters.  PRN medications for elevated blood pressure.      -GERD:  Resume PPI.  Formulary substitution when indicated.     - Monitor operative cultures, perioperative antibiotics will be managed by Dr. Tavon Valentin with [facility], currently on ceftriaxone and daptomycin.    May not be a candidate for PICC line.  Her left upper extremity is not to be used due to status postmastectomy.  Likely has kidney disease with creatinine too high to proceed with a PICC line.    She does have a port that could be used for antibiotics if approved by her oncologist.    Complex medical decision making.  Discussed with Dr. Valentin and  yesterday.     Freeman Perales MD  04/29/25  14:08 EDT

## 2025-04-29 NOTE — PROGRESS NOTES
"Orthopaedic Surgery Progress Note      SUBJECTIVE:      No acute events overnight. Doing well. Pain controlled. Tolerating diet. No nausea, vomiting, fevers or chills.    OBJECTIVE:  /82 (BP Location: Right arm, Patient Position: Lying)   Pulse 100   Temp 97.9 °F (36.6 °C) (Oral)   Resp 18   Ht 170.2 cm (67\")   Wt 63.5 kg (140 lb)   SpO2 98%   BMI 21.93 kg/m²       PE:  No acute distress  Non labored breathing  Peripheral perfusion intact  Left upper extremity: Ace wrap clean dry and intact, blue sling in place  No motor function in the digits at this time, possibly related to nerve block  WWP digits                  ASSESSMENT/PLAN:  Britney is a 64 y.o. female  s/p left elbow irrigation and debridement and bushing exchange for total elbow prosthetic joint infection (4/28)    WB Status: NWB LUE  DVT PPX: ASA  Pain control and bowel regimen   Nutritional optimization   PT/OT   ID consulted: currently on daptomycin and rocephin  Follow up: 1 week wound check  Disposition: pending final ID/hospital medicine plan. Possible discharge today after PICC placed.    "

## 2025-04-29 NOTE — PROGRESS NOTES
[facility]    Acute pain service Inpatient Progress Note    Patient Name: Britney Raman  :  1960  MRN:  [id number]        Acute Pain  Service Inpatient Progress Note:    Analgesia:Good  Pain Score:0/10  LOC: alert and awake  Resp Status: room air  Cardiac: VS stable  Side Effects:None  Catheter Site:clean, dressing intact and dry  Cath type: peripheral nerve cath(InfuSystem)  Volume: 1mL,5ml, 5ml InfuSystem Pump.  Catheter Plan:Catheter to remain Insitu and Continue catheter infusion rate unchanged

## 2025-04-29 NOTE — PLAN OF CARE
Goal Outcome Evaluation:  Plan of Care Reviewed With: patient           Outcome Evaluation: VSS. Alert orineted x4. Voiding well. Ambulates assist x1. No complaints of pain.

## 2025-04-29 NOTE — CASE MANAGEMENT/SOCIAL WORK
Continued Stay Note   Kim     Patient Name: Britney Raman  MRN: [id number]  Today's Date: 4/29/2025    Admit Date: 4/28/2025    Plan: home   Discharge Plan       Row Name 04/29/25 1442       Plan    Plan home    Patient/Family in Agreement with Plan yes    Plan Comments Met with patient and her  at the bedside to initiate discharge planning. Patient lives with her  in a house in [facility] in [place]. Patient is independent with ADLs and has a transport chair and shower chair at home. Patient denies any home health services or home oxygen use. Patient has [employer] and Medicare with prescription benefits and prefers to fill scripts at the [facility] in [place], [place]. Patient's plan is home at discharge.  will transport. [facility] is following. CM will continue to follow and assist with discharge planning as recommendations become available.    Final Discharge Disposition Code 01 - home or self-care                   Discharge Codes    No documentation.                 Expected Discharge Date and Time       Expected Discharge Date Expected Discharge Time    Apr 29, 2025               Teja Brambila RN

## 2025-04-29 NOTE — PLAN OF CARE
Goal Outcome Evaluation:  Plan of Care Reviewed With: patient        Progress: no change     The patient is pleasant, alert and oriented x4. VSS on room air. Voiding frequently via assist x1 to the bathroom. Ambulating well. The patient was able to ambulate multiple times to the bathroom. The patient was also able to ambulate 200 feet in the hallway. No complaints of pain throughout the shift. The patient was unable to rest throughout the night. The patient's left hand remains numb. Nerve block in place. Current plan is to possibly discharge the patient home today.

## 2025-04-30 LAB
ANION GAP SERPL CALCULATED.3IONS-SCNC: 14 MMOL/L (ref 5–15)
BUN SERPL-MCNC: 13 MG/DL (ref 8–23)
BUN/CREAT SERPL: 8.3 (ref 7–25)
CALCIUM SPEC-SCNC: 7.8 MG/DL (ref 8.6–10.5)
CHLORIDE SERPL-SCNC: 102 MMOL/L (ref 98–107)
CO2 SERPL-SCNC: 17 MMOL/L (ref 22–29)
CREAT SERPL-MCNC: 1.57 MG/DL (ref 0.57–1)
DEPRECATED RDW RBC AUTO: 57.6 FL (ref 37–54)
EGFRCR SERPLBLD CKD-EPI 2021: 36.9 ML/MIN/1.73
ERYTHROCYTE [DISTWIDTH] IN BLOOD BY AUTOMATED COUNT: 21.7 % (ref 12.3–15.4)
GLUCOSE SERPL-MCNC: 123 MG/DL (ref 65–99)
HCT VFR BLD AUTO: 31.4 % (ref 34–46.6)
HGB BLD-MCNC: 8.7 G/DL (ref 12–15.9)
MCH RBC QN AUTO: 20.6 PG (ref 26.6–33)
MCHC RBC AUTO-ENTMCNC: 27.7 G/DL (ref 31.5–35.7)
MCV RBC AUTO: 74.2 FL (ref 79–97)
PLATELET # BLD AUTO: 247 10*3/MM3 (ref 140–450)
PMV BLD AUTO: 9 FL (ref 6–12)
POTASSIUM SERPL-SCNC: 3.3 MMOL/L (ref 3.5–5.2)
RBC # BLD AUTO: 4.23 10*6/MM3 (ref 3.77–5.28)
SODIUM SERPL-SCNC: 133 MMOL/L (ref 136–145)
WBC NRBC COR # BLD AUTO: 11.13 10*3/MM3 (ref 3.4–10.8)

## 2025-04-30 PROCEDURE — 25810000003 SODIUM CHLORIDE 0.9 % SOLUTION: Performed by: INTERNAL MEDICINE

## 2025-04-30 PROCEDURE — 97535 SELF CARE MNGMENT TRAINING: CPT | Performed by: OCCUPATIONAL THERAPIST

## 2025-04-30 PROCEDURE — A9270 NON-COVERED ITEM OR SERVICE: HCPCS | Performed by: INTERNAL MEDICINE

## 2025-04-30 PROCEDURE — 25010000002 CEFTRIAXONE PER 250 MG: Performed by: INTERNAL MEDICINE

## 2025-04-30 PROCEDURE — C1894 INTRO/SHEATH, NON-LASER: HCPCS

## 2025-04-30 PROCEDURE — 80048 BASIC METABOLIC PNL TOTAL CA: CPT | Performed by: INTERNAL MEDICINE

## 2025-04-30 PROCEDURE — 97530 THERAPEUTIC ACTIVITIES: CPT

## 2025-04-30 PROCEDURE — 97116 GAIT TRAINING THERAPY: CPT

## 2025-04-30 PROCEDURE — 63710000001 POTASSIUM CHLORIDE 20 MEQ TABLET CONTROLLED-RELEASE: Performed by: INTERNAL MEDICINE

## 2025-04-30 PROCEDURE — 25010000002 DAPTOMYCIN PER 1 MG: Performed by: INTERNAL MEDICINE

## 2025-04-30 PROCEDURE — 85027 COMPLETE CBC AUTOMATED: CPT | Performed by: NURSE PRACTITIONER

## 2025-04-30 PROCEDURE — 63710000001 SODIUM BICARBONATE 650 MG TABLET: Performed by: INTERNAL MEDICINE

## 2025-04-30 PROCEDURE — C1751 CATH, INF, PER/CENT/MIDLINE: HCPCS

## 2025-04-30 PROCEDURE — 63710000001 METOPROLOL SUCCINATE XL 100 MG TABLET SUSTAINED-RELEASE 24 HOUR: Performed by: INTERNAL MEDICINE

## 2025-04-30 PROCEDURE — 97110 THERAPEUTIC EXERCISES: CPT | Performed by: OCCUPATIONAL THERAPIST

## 2025-04-30 PROCEDURE — 63710000001 CITALOPRAM 40 MG TABLET: Performed by: INTERNAL MEDICINE

## 2025-04-30 RX ORDER — SODIUM CHLORIDE 0.9 % (FLUSH) 0.9 %
20 SYRINGE (ML) INJECTION AS NEEDED
Status: DISCONTINUED | OUTPATIENT
Start: 2025-04-30 | End: 2025-05-01 | Stop reason: HOSPADM

## 2025-04-30 RX ORDER — SODIUM CHLORIDE 0.9 % (FLUSH) 0.9 %
10 SYRINGE (ML) INJECTION EVERY 12 HOURS SCHEDULED
Status: DISCONTINUED | OUTPATIENT
Start: 2025-04-30 | End: 2025-05-01 | Stop reason: HOSPADM

## 2025-04-30 RX ORDER — POTASSIUM CHLORIDE 1500 MG/1
40 TABLET, EXTENDED RELEASE ORAL EVERY 4 HOURS
Status: COMPLETED | OUTPATIENT
Start: 2025-04-30 | End: 2025-04-30

## 2025-04-30 RX ORDER — SODIUM CHLORIDE 0.9 % (FLUSH) 0.9 %
10 SYRINGE (ML) INJECTION AS NEEDED
Status: DISCONTINUED | OUTPATIENT
Start: 2025-04-30 | End: 2025-05-01 | Stop reason: HOSPADM

## 2025-04-30 RX ORDER — SODIUM CHLORIDE 9 MG/ML
40 INJECTION, SOLUTION INTRAVENOUS AS NEEDED
Status: DISCONTINUED | OUTPATIENT
Start: 2025-04-30 | End: 2025-05-01 | Stop reason: HOSPADM

## 2025-04-30 RX ADMIN — EXEMESTANE 25 MG: 25 TABLET ORAL at 09:15

## 2025-04-30 RX ADMIN — DAPTOMYCIN 500 MG: 500 INJECTION, POWDER, LYOPHILIZED, FOR SOLUTION INTRAVENOUS at 00:32

## 2025-04-30 RX ADMIN — Medication 10 ML: at 21:45

## 2025-04-30 RX ADMIN — CEFTRIAXONE 2000 MG: 2 INJECTION, POWDER, FOR SOLUTION INTRAMUSCULAR; INTRAVENOUS at 22:48

## 2025-04-30 RX ADMIN — POTASSIUM CHLORIDE 40 MEQ: 20 TABLET, EXTENDED RELEASE ORAL at 15:25

## 2025-04-30 RX ADMIN — SODIUM BICARBONATE 650 MG TABLET 1300 MG: at 09:14

## 2025-04-30 RX ADMIN — EVEROLIMUS 5 MG: 5 TABLET ORAL at 09:14

## 2025-04-30 RX ADMIN — Medication 10 ML: at 09:17

## 2025-04-30 RX ADMIN — SODIUM BICARBONATE 650 MG TABLET 1300 MG: at 21:45

## 2025-04-30 RX ADMIN — Medication 10 ML: at 15:26

## 2025-04-30 RX ADMIN — SODIUM CHLORIDE 100 ML/HR: 9 INJECTION, SOLUTION INTRAVENOUS at 03:10

## 2025-04-30 RX ADMIN — METOPROLOL SUCCINATE 100 MG: 100 TABLET, EXTENDED RELEASE ORAL at 09:14

## 2025-04-30 RX ADMIN — POTASSIUM CHLORIDE 40 MEQ: 20 TABLET, EXTENDED RELEASE ORAL at 21:45

## 2025-04-30 RX ADMIN — CITALOPRAM HYDROBROMIDE 40 MG: 40 TABLET ORAL at 09:14

## 2025-04-30 NOTE — PROGRESS NOTES
[place]    Acute pain service Inpatient Progress Note    Patient Name: Britney Raman  :  1960  MRN:  [id number]        Acute Pain  Service Inpatient Progress Note:    Analgesia:Good  Pain Score:2/10  LOC: alert and awake  Side Effects:None  Catheter Site:clean, dry and dressing intact  Cath type: peripheral nerve cath(InfuSystem)  Infusion rate: Ext/Pop: Basal: 1ml/hr, PIB: 5ml q 2 h, PCA: 5 ml q 30 min  Catheter Plan:Catheter to remain Insitu and Continue catheter infusion rate unchanged

## 2025-04-30 NOTE — THERAPY TREATMENT NOTE
Patient Name: Britney Raman  : 1960    MRN: [id number]                              Today's Date: 2025       Admit Date: 2025    Visit Dx:     ICD-10-CM ICD-9-CM   1. Infection of prosthetic shoulder joint, initial encounter  T84.59XA 996.66    Z96.619 V43.61   2. 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, initial encounter    Infection of prosthetic shoulder joint     Past Medical History:   Diagnosis Date    Arthritis     Breast cancer 2018    Left - had mastectomy and subsequent chemo and radiation - new diagnosis () of right breast CA and mets to spine    Cataract     MILD    GERD (gastroesophageal reflux disease)     Hx MRSA infection 2018    right arm x 2 and right leg; most recent treatment 2018    Knee pain     LEFT MAINLY    PONV (postoperative nausea and vomiting)     AFTER BREAST SURGERY- HAD WATER AND THREW IT UP IMMEDIATELY    Urinary incontinence in female     UTI (urinary tract infection)     Wears glasses      Past Surgical History:   Procedure Laterality Date    BREAST BIOPSY Bilateral     DIAGNOSTIC LAPAROSCOPY      ENDOSCOPY      EYE SURGERY Bilateral     AS CHILD    INJECTION OF MEDICATION      X7 LEFT KNEE    MASTECTOMY Left 2018    PORTACATH PLACEMENT      TOTAL ELBOW ARTHROPLASTY Left 2022    Procedure: TOTAL ELBOW ARTHROPLASTY FOR FRACTURE,  ULNAR NERVE TRANSPOSITION;  Surgeon: Juan David Sunshine MD;  Location: [facility] OR;  Service: Orthopedics;  Laterality: Left;    TOTAL ELBOW ARTHROPLASTY Left 2025    Procedure: TOTAL ELBOW REVISION, IRRIGATION AND DEBRIDEMENT LEFT;  Surgeon: Juan David Sunshine MD;  Location: [facility] OR;  Service: Orthopedics;   Laterality: Left;    TUBAL ABDOMINAL LIGATION        General Information       Row Name 04/30/25 1124          OT Time and Intention    Document Type therapy note (daily note)  -AR     Mode of Treatment individual therapy;occupational therapy  -AR       Row Name 04/30/25 1124          General Information    Existing Precautions/Restrictions fall;left;other (see comments)  infraclavicular nerve catheter, NWB LUE, sling as needed  -AR       Row Name 04/30/25 1124          Cognition    Orientation Status (Cognition) oriented x 4  -AR       Row Name 04/30/25 1124          Safety Issues/Impairments Affecting Functional Mobility    Impairments Affecting Function (Mobility) pain;range of motion (ROM);sensation/sensory awareness;strength;endurance/activity tolerance  -AR               User Key  (r) = Recorded By, (t) = Taken By, (c) = Cosigned By      Initials Name Provider Type    AR Shae Merida, OT Occupational Therapist                     Mobility/ADL's       Row Name 04/30/25 [facility]          Transfers    Comment, (Transfers) Deferred d/t fatigue  -AR       Row Name 04/30/25 [facility]          Activities of Daily Living    BADL Assessment/Intervention bathing;upper body dressing  -AR       Row Name 04/30/25 [facility]          Mobility    Extremity Weight-bearing Status left upper extremity  -AR     Left Upper Extremity (Weight-bearing Status) non weight-bearing (NWB)  -AR       Row Name 04/30/25 [facility]          Upper Body Dressing Assessment/Training    [place] Level (Upper Body Dressing) dependent (less than 25% patient effort)  -AR     Position (Upper Body Dressing) supine  -AR     Comment, (Upper Body Dressing) Reviewed brenda-dressing technique and careof infraclavicular nerve catheter to avoid dislodgement. Educated pt and spouse on use of sling as needed for comfort and elevating on pillow as she tolerates. She requested to doff sling and elevate end of session.  -AR               User Key  (r) = Recorded By, (t) =  Taken By, (c) = Cosigned By      Initials Name Provider Type    Shae Jarquin OT Occupational Therapist                   Obj/Interventions       Row Name 04/30/25 1149          Sensory Assessment (Somatosensory)    Sensory Assessment (Somatosensory) left UE  -AR     Sensory Subjective Reports numbness  -AR       Row Name 04/30/25 1149          Shoulder (Therapeutic Exercise)    Shoulder (Therapeutic Exercise) AROM (active range of motion)  -AR     Shoulder AROM (Therapeutic Exercise) left;flexion;other (see comments)  2 reps d/t pain, deferred further  -AR       Row Name 04/30/25 1149          Elbow/Forearm (Therapeutic Exercise)    Elbow/Forearm (Therapeutic Exercise) AAROM (active assistive range of motion)  -AR     Elbow/Forearm AAROM (Therapeutic Exercise) left;flexion;extension;supination;pronation;supine;10 repetitions  -AR       Row Name 04/30/25 1149          Wrist (Therapeutic Exercise)    Wrist (Therapeutic Exercise) AAROM (active assistive range of motion)  -AR     Wrist AAROM (Therapeutic Exercise) left;flexion;extension;10 repetitions  -AR       Row Name 04/30/25 1149          Hand (Therapeutic Exercise)    Hand (Therapeutic Exercise) AROM (active range of motion)  -AR     Hand AROM/AAROM (Therapeutic Exercise) left;AAROM (active assistive range of motion);finger flexion;finger extension;10 repetitions  -AR       Row Name 04/30/25 1149          Motor Skills    Therapeutic Exercise elbow/forearm;wrist;hand;shoulder  -AR               User Key  (r) = Recorded By, (t) = Taken By, (c) = Cosigned By      Initials Name Provider Type    Shae Jarquin, JOSELITO Occupational Therapist                   Goals/Plan    No documentation.                  Clinical Impression       Row Name 04/30/25 1150          Pain Assessment    Pretreatment Pain Rating 1/10  -AR     Posttreatment Pain Rating 1/10  -AR     Pain Location other (see comments)  incision  -AR     Pain Side/Orientation left  -AR     Pain  Management Interventions activity modification encouraged;exercise or physical activity utilized;movement retraining implemented;positioning techniques utilized;other (see comments)  doffed sling and elevated per pt request  -AR     Response to Pain Interventions activity participation with tolerable pain  -AR       Row Name 04/30/25 1150          Plan of Care Review    Plan of Care Reviewed With patient;spouse  -AR     Outcome Evaluation OT educated pt and spouse on ADL retraining for comfort and HEP. She tolerated gentle AAROM LUE and declined out of bed activity d/t fatigue. Family will provide 24/7 assist, anticipate DC home with family assist.  -AR       Row Name 04/30/25 1150          Therapy Plan Review/Discharge Plan (OT)    Anticipated Discharge Disposition (OT) home with 24/7 care  -AR       Row Name 04/30/25 1150          Vital Signs    Pre Patient Position Supine  -AR     Intra Patient Position Supine  -AR     Post Patient Position Supine  -AR       Row Name 04/30/25 1150          Positioning and Restraints    Pre-Treatment Position in bed  -AR     Post Treatment Position bed  -AR     In Bed supine;call light within reach;encouraged to call for assist;exit alarm on;with family/caregiver;LUE elevated  pt declined cold pack  -AR               User Key  (r) = Recorded By, (t) = Taken By, (c) = Cosigned By      Initials Name Provider Type    Shae Jarquin, OT Occupational Therapist                   Outcome Measures       Row Name 04/30/25 4949          How much help from another is currently needed...    Putting on and taking off regular lower body clothing? 2  -AR     Bathing (including washing, rinsing, and drying) 2  -AR     Toileting (which includes using toilet bed pan or urinal) 2  -AR     Putting on and taking off regular upper body clothing 2  -AR     Taking care of personal grooming (such as brushing teeth) 3  -AR     Eating meals 3  -AR     AM-PAC 6 Clicks Score (OT) 14  -AR       Row  Name 04/30/25 1008          How much help from another person do you currently need...    Turning from your back to your side while in flat bed without using bedrails? 3  -LH     Moving from lying on back to sitting on the side of a flat bed without bedrails? 3  -LH     Moving to and from a bed to a chair (including a wheelchair)? 3  -LH     Standing up from a chair using your arms (e.g., wheelchair, bedside chair)? 3  -LH     Climbing 3-5 steps with a railing? 2  -LH     To walk in hospital room? 3  -LH     AM-PAC 6 Clicks Score (PT) 17  -LH     Highest Level of Mobility Goal 5 --> Static standing  -LH       Row Name 04/30/25 1509 04/30/25 1008       Functional Assessment    Outcome Measure Options AM-PAC 6 Clicks Daily Activity (OT)  -AR AM-PAC 6 Clicks Basic Mobility (PT)  -              User Key  (r) = Recorded By, (t) = Taken By, (c) = Cosigned By      Initials Name Provider Type    Shae Jarquin, OT Occupational Therapist     Carole Ruggiero, PT Physical Therapist                    Occupational Therapy Education       Title: PT OT SLP Therapies (Done)       Topic: Occupational Therapy (Done)       Point: ADL training (Done)       Learning Progress Summary            Patient Eager, E,TB,D,H, VU,DU by AR at 4/30/2025 1509    Acceptance, E, NR by  at 4/29/2025 0802   Family Eager, E,TB,D,H, VU,DU by AR at 4/30/2025 1509                      Point: Home exercise program (Done)       Learning Progress Summary            Patient Eager, E,TB,D,H, VU,DU by AR at 4/30/2025 1509    Acceptance, E, NR by  at 4/29/2025 0802   Family Eager, E,TB,D,H, VU,DU by AR at 4/30/2025 1509                      Point: Precautions (Done)       Learning Progress Summary            Patient Eager, E,TB,D,H, VU,DU by AR at 4/30/2025 1509    Acceptance, E, NR by  at 4/29/2025 0802   Family Eager, E,TB,D,H, VU,DU by AR at 4/30/2025 1509                      Point: Body mechanics (Done)       Learning Progress Summary             Patient Eager, E,TB,D,H, VU,DU by AR at 4/30/2025 1509    Acceptance, E, NR by  at 4/29/2025 0802   Family Eager, E,TB,D,H, VU,DU by AR at 4/30/2025 1509                                      User Key       Initials Effective Dates Name Provider Type Discipline    AR 07/11/23 -  Shae Merida OT Occupational Therapist OT    RASHMI 06/16/21 -  Sita Sandra OT Occupational Therapist OT                  OT Recommendation and Plan     Plan of Care Review  Plan of Care Reviewed With: patient, spouse  Outcome Evaluation: OT educated pt and spouse on ADL retraining for comfort and HEP. She tolerated gentle AAROM LUE and declined out of bed activity d/t fatigue. Family will provide 24/7 assist, anticipate DC home with family assist.     Time Calculation:         Time Calculation- OT       Row Name 04/30/25 1509 04/30/25 1011          Time Calculation- OT    OT Start Time 1033  -AR --     OT Received On 04/30/25  -AR --     OT Goal Re-Cert Due Date 05/09/25  -AR --        Timed Charges    59622 - OT Therapeutic Exercise Minutes 13  -AR --     78590 - Gait Training Minutes  -- 13  -LH     24454 - OT Self Care/Mgmt Minutes 14  -AR --        Total Minutes    Timed Charges Total Minutes 27  -AR 13  -      Total Minutes 27  -AR 13  -               User Key  (r) = Recorded By, (t) = Taken By, (c) = Cosigned By      Initials Name Provider Type    AR Shae Merida OT Occupational Therapist     Carole Ruggiero, PT Physical Therapist                  Therapy Charges for Today       Code Description Service Date Service Provider Modifiers Qty    52612402600  OT THER PROC EA 15 MIN 4/30/2025 Shae Merida OT GO 1    [id number] HC OT SELF CARE/MGMT/TRAIN EA 15 MIN 4/30/2025 Shae Merida OT GO 1                 Shae Merida OT  4/30/2025

## 2025-04-30 NOTE — PROGRESS NOTES
INFECTIOUS DISEASE Progress Note    Britney Raman  1960  [id number]      Admission Date: 4/28/2025      Requesting Provider: Juan David Sunshine MD  Evaluating Physician: Tavon Valentin MD    Reason for Consultation:  Left total elbow arthroplasty infection    History of present illness:    4/28/25: Patient is a 64 y.o. female  with metastatic breast cancer who is seen today for evaluation of a left total elbow arthroplasty infection.  She has a history of bilateral breast cancer with metastatic disease to her liver, spine, and lung.  She developed a pathologic distal humerus fracture and underwent left total elbow arthroplasty on 5/25/2022.   Approximately 2-3 months ago she noted the onset of slowly increasing left elbow pain.  2 weeks ago she noted the onset of left elbow swelling with worsening erythema.   CT scan revealed significant elbow fluid.  She has not been on antibiotic therapy and did  not undergo elbow aspiration.   She is scheduled for left elbow debridement with poly exchange today.  She denies fever, chills, sweats.  She has a history of prior MRSA axillary infection.  She states that she is allergic to clindamycin and Bactrim after developing a rash while on both of these medications.  She is uncertain  which antibiotic caused the rash.    she has received perioperative vancomycin and cefazolin prophylaxis.  I saw her in the preoperative area.  At the time of her surgery she was found to have purulent material with necrotic tissue.    Laboratory studies from 4/18 revealed a C-reactive protein of 14.5, sedimentation of 74, and white blood cell count of 4.5.     4/29/25:    She has remained afebrile.  Left elbow fluid Gram stain revealed few white blood cells and few gram-positive cocci in pairs and clusters.   Left elbow cultures are no growth so far.   She has a right-sided Port-A-Cath in place.   Her creatinine today is 1.85 with an  EGFR of 30.    4/30/25:   She remains afebrile.  Left elbow operative cultures from 4/28 are no growth so far.   Denies uncontrolled abdominal pain.  She denies nausea and vomiting.  She states that she would never undergo hemodialysis under any circumstances.       Past Medical History:   Diagnosis Date    Arthritis     Breast cancer 2018    Left - had mastectomy and subsequent chemo and radiation - new diagnosis (2022) of right breast CA and mets to spine    Cataract     MILD    GERD (gastroesophageal reflux disease)     Hx MRSA infection 2018    right arm x 2 and right leg; most recent treatment 2018    Knee pain     LEFT MAINLY    PONV (postoperative nausea and vomiting)     AFTER BREAST SURGERY- HAD WATER AND THREW IT UP IMMEDIATELY    Urinary incontinence in female     UTI (urinary tract infection)     Wears glasses        Past Surgical History:   Procedure Laterality Date    BREAST BIOPSY Bilateral     DIAGNOSTIC LAPAROSCOPY      ENDOSCOPY      EYE SURGERY Bilateral     AS CHILD    INJECTION OF MEDICATION      X7 LEFT KNEE    MASTECTOMY Left 2018    PORTACATH PLACEMENT      TOTAL ELBOW ARTHROPLASTY Left 5/25/2022    Procedure: TOTAL ELBOW ARTHROPLASTY FOR FRACTURE,  ULNAR NERVE TRANSPOSITION;  Surgeon: Juan David Sunshine MD;  Location:  [facility] OR;  Service: Orthopedics;  Laterality: Left;    TOTAL ELBOW ARTHROPLASTY Left 4/28/2025    Procedure: TOTAL ELBOW REVISION, IRRIGATION AND DEBRIDEMENT LEFT;  Surgeon: Juan David Sunshine MD;  Location:  [facility] OR;  Service: Orthopedics;  Laterality: Left;    TUBAL ABDOMINAL LIGATION         Family History   Problem Relation Age of Onset    Heart disease Mother     Heart disease Father        Social History     Socioeconomic History    Marital status:    Tobacco Use    Smoking status: Former     Current packs/day: 0.50     Average packs/day: 0.5 packs/day for 5.0 years (2.5 ttl pk-yrs)     Types: Cigarettes    Smokeless tobacco: Never    Tobacco comments:     QUIT AGE 19   Vaping Use    Vaping status: Never  Used   Substance and Sexual Activity    Alcohol use: Never    Drug use: Never       Allergies   Allergen Reactions    Clindamycin GI Intolerance    Bactrim Ds [Sulfamethoxazole-Trimethoprim] Rash         Medication:    Current Facility-Administered Medications:     cefTRIAXone (ROCEPHIN) 2,000 mg in sodium chloride 0.9 % 100 mL MBP, 2,000 mg, Intravenous, Q24H, Tavon Valentin MD, Last Rate: 200 mL/hr at 04/29/25 2302, 2,000 mg at 04/29/25 2302    citalopram (CeleXA) tablet 40 mg, 40 mg, Oral, Daily, Freeman Perales MD, 40 mg at 04/29/25 0908    DAPTOmycin (CUBICIN) 500 mg in sodium chloride 0.9 % 50 mL IVPB, 8 mg/kg, Intravenous, Q24H, Tavon Valentin MD, Last Rate: 100 mL/hr at 04/30/25 0032, 500 mg at 04/30/25 0032    everolimus (AFINITOR) tablet 5 mg **Patient supplied med**, 5 mg, Oral, Daily, Freeman Perales MD, 5 mg at 04/29/25 1403    exemestane (AROMASIN) tablet 25 mg **Patient Supplied Med**, 25 mg, Oral, Daily, Freeman Perales MD, 25 mg at 04/29/25 1403    HYDROmorphone (DILAUDID) injection 0.5 mg, 0.5 mg, Intravenous, Q2H PRN **AND** naloxone (NARCAN) injection 0.1 mg, 0.1 mg, Intravenous, Q5 Min PRN, Juan David Sunshine MD    labetalol (NORMODYNE,TRANDATE) injection 10 mg, 10 mg, Intravenous, Q4H PRN, Freeman Perales MD    metoprolol succinate XL (TOPROL-XL) 24 hr tablet 100 mg, 100 mg, Oral, Daily, Freeman Perales MD, 100 mg at 04/29/25 0908    ropivacaine (NAROPIN) 0.2 % infusion (INFUSYSTEM), , Peripheral Nerve, Continuous, Black Villarreal CRNA, 1,000 mg at 04/28/25 1557    sodium bicarbonate tablet 1,300 mg, 1,300 mg, Oral, BID, Freeman Perales MD, 1,300 mg at 04/29/25 2301    sodium chloride 0.9 % bolus 500 mL, 500 mL, Intravenous, TID PRN, Freeman Perales MD    sodium chloride 0.9 % flush 10 mL, 10 mL, Intravenous, Q12H, Juan David Sunshine MD, 10 mL at 04/29/25 2301    sodium chloride 0.9 % flush 10 mL, 10 mL, Intravenous, PRN, Juan David Sunshine MD     sodium chloride 0.9 % infusion 40 mL, 40 mL, Intravenous, PRN, Juan David Sunshine MD    sodium chloride 0.9 % infusion, 100 mL/hr, Intravenous, Continuous, Freeman Perales MD, Last Rate: 100 mL/hr at 25 0310, 100 mL/hr at 25 0310    Antibiotics:  Anti-Infectives (From admission, onward)      Ordered     Dose/Rate Route Frequency Start Stop    25  DAPTOmycin (CUBICIN) 500 mg in sodium chloride 0.9 % 50 mL IVPB        Ordering Provider: Tavon Valentin MD    8 mg/kg × 63.5 kg  100 mL/hr over 30 Minutes Intravenous Every 24 Hours 25 2300 25 2259    25 2152  cefTRIAXone (ROCEPHIN) 2,000 mg in sodium chloride 0.9 % 100 mL MBP        Ordering Provider: Tavon Valentin MD    2,000 mg  200 mL/hr over 30 Minutes Intravenous Every 24 Hours 25 2300 25 2259    25 1146  vancomycin (VANCOCIN) 1,000 mg in sodium chloride 0.9 % 250 mL IVPB-VTB        Ordering Provider: Jua nDavid Sunshine MD    15 mg/kg × 63.5 kg  250 mL/hr over 60 Minutes Intravenous Once 25 1148 25 1347    25 1123  ceFAZolin 2000 mg IVPB in 100 mL NS (MBP)        Ordering Provider: Juan David Sunshine MD    2,000 mg  over 30 Minutes Intravenous Once 25 1125 25 1311              Review of Systems:  See HPI      Physical Exam:   Vital Signs  Temp (24hrs), Av.8 °F (36.6 °C), Min:97 °F (36.1 °C), Max:98.7 °F (37.1 °C)    Temp  Min: 97 °F (36.1 °C)  Max: 98.7 °F (37.1 °C)  BP  Min: 125/71  Max: 146/76  Pulse  Min: 85  Max: 97  Resp  Min: 16  Max: 16  SpO2  Min: 97 %  Max: 98 %    GENERAL: Awake and alert, in no acute distress.   HEENT: Normocephalic, atraumatic.  PERRL. EOMI. No conjunctival injection. No icterus. Oropharynx clear without evidence of thrush or exudate.  NECK: Supple   HEART: RRR; No murmur, rubs, gallops.   LUNGS: Clear to auscultation bilaterally without wheezing, rales, rhonchi. Normal respiratory effort. Nonlabored. No dullness.  ABDOMEN:  "Soft, nontender, nondistended. No rebound or guarding. NO mass or HSM.  EXT: Left elbow is in a postoperative dressing  :  Without Amaya catheter.  MSK: No joint effusions or erythema  SKIN: Warm and dry without cutaneous eruptions on Inspection/palpation.    NEURO: Oriented to PPT.  Motor 5/5 strength  PSYCHIATRIC: Normal insight and judgment. Cooperative with PE    Laboratory Data        Results from last 7 days   Lab Units 04/29/25  0852   SODIUM mmol/L 132*   POTASSIUM mmol/L 3.6   CHLORIDE mmol/L 98   CO2 mmol/L 16.0*   BUN mg/dL 17   CREATININE mg/dL 1.85*   GLUCOSE mg/dL 160*   CALCIUM mg/dL 8.2*     Results from last 7 days   Lab Units 04/29/25  0852   ALK PHOS U/L 60   BILIRUBIN mg/dL 0.2   ALT (SGPT) U/L 8   AST (SGOT) U/L 18         Results from last 7 days   Lab Units 04/29/25  0852   CRP mg/dL 5.28*         Results from last 7 days   Lab Units 04/29/25  0852   CK TOTAL U/L 76         Estimated Creatinine Clearance: 31.2 mL/min (A) (by C-G formula based on SCr of 1.85 mg/dL (H)).      Microbiology:  No results found for: \"ACANTHNAEG\", \"AFBCX\", \"BPERTUSSISCX\", \"BLOODCX\"  No results found for: \"BCIDPCR\", \"CXREFLEX\", \"CSFCX\", \"CULTURETIS\"  No results found for: \"CULTURES\", \"HSVCX\", \"URCX\"  No results found for: \"EYECULTURE\", \"GCCX\", \"HSVCULTURE\", \"LABHSV\"  No results found for: \"LEGIONELLA\", \"MRSACX\", \"MUMPSCX\", \"MYCOPLASCX\"  No results found for: \"NOCARDIACX\", \"STOOLCX\"  No results found for: \"THROATCX\", \"UNSTIMCULT\", \"URINECX\", \"CULTURE\", \"VZVCULTUR\"  No results found for: \"VIRALCULTU\", \"WOUNDCX\"        Radiology:  Imaging Results (Last 72 Hours)       Procedure Component Value Units Date/Time    XR Elbow 3+ View Left [[id number]] Collected: 04/28/25 2225     Updated: 04/28/25 2232    Narrative:      XR ELBOW 3+ VW LEFT    Date of Exam: 4/28/2025 5:12 PM EDT    Indication: postop revision TEA    Comparison: Left elbow series 5/25/2022    Findings:  Joint prosthesis remains in place.    Soft tissue air or " gas is evident. Air or gas is seen in the elbow joint.    Lucency and sclerosis is seen in the distal humeral metaphysis, and in the proximal ulna.    No fractures are visualized.      Impression:      Impression:  AP and lateral views of the left elbow, as above.      Electronically Signed: Romel Nickerson MD    4/28/2025 10:29 PM EDT    Workstation ID: [id number]    IMAGING SCANNED [[id number]] Resulted: 04/28/25     Updated: 04/28/25 1256    IMAGING SCANNED [[id number]] Resulted: 04/28/25     Updated: 04/28/25 1242              Impression:   1.  Left total elbow arthroplasty infection-S/P debridement with poly exchange 4/28.  She will require at least 6 weeks of intravenous antibiotic therapy followed by oral antibiotic suppression. Staphylococcal  infection is unlikely since her cultures are no growth at 2 days.  I will discontinue daptomycin therapy.  I will leave her on intravenous ceftriaxone.  2.  Metastatic breast cancer  3.  History of Bactrim/clindamycin allergies  4.   History of MRSA axillary infection-remote  5.   Chronic kidney disease-versus acute kidney injury.  Her EGFR is only 30.      PLAN/RECOMMENDATIONS:   --  S/P Left total elbow debridement 4/28  --  Left elbow cultures  --  Stop intravenous daptomycin  --  Intravenous Rocephin  --  Right arm PICC line placement    She will be at high risk for persistent infection/relapse.  She will likely require 6 weeks of intravenous antibiotic therapy followed by chronic oral antibiotic suppression.  Her oral antibiotic suppression will be complicated by her potential Bactrim/clindamycin allergies.   I will plan to proceed with right arm PICC line placement. I think her risk of Port-A-Cath infection will be substantial with a long-term Morales needle in place for 6 weeks.    No dysfunction to have contraindication for PICC line placement but I do not think she will ever require hemodialysis fistula since she indicates that she would never agree to chronic  dialysis and since life expectancy is limited due to her metastatic breast cancer.    I discussed potential risks and benefits of PICC line placement in detail with her today.  She would like to proceed with PICC line placement.    This visit included the following complex service elements:  Complex medical decision-making associated with antimicrobial prescribing.  In-depth chart review with high level synthesis for complex diagnoses.  Managed infection treatment protocol associated with transitions of care for this complex patient.               Tavon Valentin MD  4/30/2025  06:51 EDT

## 2025-04-30 NOTE — PLAN OF CARE
Goal Outcome Evaluation:  Plan of Care Reviewed With: patient, spouse        Progress: improving  Outcome Evaluation: Pt increased ambulation distance to 280 ft w/ intermittent R HHA, CGA. She continues demo mild unsteadiness w/o overt LOB. Pt would continue to benefit from IP PT services while hospitalized to address deficits. Recommend home w/ 24/7 assist and HHPT at d/c.    Anticipated Discharge Disposition (PT): home with 24/7 care, home with home health

## 2025-04-30 NOTE — PLAN OF CARE
Goal Outcome Evaluation:   Pt was alert and oriented x 4. VSS on room air. PICC line placed today. Dressing clean, dry, and intact. No complaints of pain. Ambulated several times to bathroom during shift. Call light in reach.     Problem: Adult Inpatient Plan of Care  Goal: Absence of Hospital-Acquired Illness or Injury  Intervention: Identify and Manage Fall Risk  Recent Flowsheet Documentation  Taken 4/30/2025 1800 by Gavino Salcedo, LAMIN  Safety Promotion/Fall Prevention:   activity supervised   assistive device/personal items within reach   fall prevention program maintained   elopement precautions   clutter free environment maintained   lighting adjusted   gait belt   mobility aid in reach   muscle strengthening facilitated   nonskid shoes/slippers when out of bed   room organization consistent   safety round/check completed   toileting scheduled  Taken 4/30/2025 1600 by Gavino Salcedo, LAMIN  Safety Promotion/Fall Prevention:   activity supervised   assistive device/personal items within reach   elopement precautions   clutter free environment maintained   fall prevention program maintained   gait belt   lighting adjusted   mobility aid in reach   muscle strengthening facilitated   nonskid shoes/slippers when out of bed   room organization consistent   safety round/check completed   toileting scheduled  Taken 4/30/2025 1400 by Gavino Salcedo, LAMIN  Safety Promotion/Fall Prevention:   activity supervised   assistive device/personal items within reach   clutter free environment maintained   fall prevention program maintained   elopement precautions   muscle strengthening facilitated   nonskid shoes/slippers when out of bed   mobility aid in reach   lighting adjusted   gait belt   room organization consistent   safety round/check completed   toileting scheduled  Taken 4/30/2025 1200 by Gavino Salcedo, LAMIN  Safety Promotion/Fall Prevention:   activity supervised   clutter free environment maintained   assistive  device/personal items within reach   elopement precautions   fall prevention program maintained   gait belt   lighting adjusted   mobility aid in reach   muscle strengthening facilitated   nonskid shoes/slippers when out of bed   room organization consistent   safety round/check completed   toileting scheduled  Taken 4/30/2025 1000 by Gavino Salcedo RN  Safety Promotion/Fall Prevention:   activity supervised   assistive device/personal items within reach   clutter free environment maintained   elopement precautions   gait belt   fall prevention program maintained   lighting adjusted   mobility aid in reach   muscle strengthening facilitated   nonskid shoes/slippers when out of bed   room organization consistent   safety round/check completed   toileting scheduled  Taken 4/30/2025 0800 by Gavino Salcedo, LAMIN  Safety Promotion/Fall Prevention:   activity supervised   assistive device/personal items within reach   clutter free environment maintained   gait belt   elopement precautions   fall prevention program maintained   lighting adjusted   mobility aid in reach   muscle strengthening facilitated   nonskid shoes/slippers when out of bed   room organization consistent   toileting scheduled   safety round/check completed     Problem: Adult Inpatient Plan of Care  Goal: Absence of Hospital-Acquired Illness or Injury  Intervention: Prevent Infection  Recent Flowsheet Documentation  Taken 4/30/2025 1800 by Gavino Salcedo, LAMIN  Infection Prevention:   environmental surveillance performed   equipment surfaces disinfected   hand hygiene promoted   rest/sleep promoted   single patient room provided  Taken 4/30/2025 1600 by Gavino Salcedo RN  Infection Prevention:   environmental surveillance performed   equipment surfaces disinfected   hand hygiene promoted   single patient room provided   rest/sleep promoted  Taken 4/30/2025 1400 by Gavino Salcedo, LAMIN  Infection Prevention:   environmental surveillance performed   equipment  surfaces disinfected   hand hygiene promoted   rest/sleep promoted   single patient room provided  Taken 4/30/2025 1200 by Gavino Salcedo, RN  Infection Prevention:   environmental surveillance performed   hand hygiene promoted   equipment surfaces disinfected   rest/sleep promoted   single patient room provided  Taken 4/30/2025 1000 by Gavino Salcedo, RN  Infection Prevention:   environmental surveillance performed   equipment surfaces disinfected   hand hygiene promoted   single patient room provided   rest/sleep promoted  Taken 4/30/2025 0800 by Gavino Salcedo, RN  Infection Prevention:   environmental surveillance performed   equipment surfaces disinfected   hand hygiene promoted   single patient room provided   rest/sleep promoted

## 2025-04-30 NOTE — CASE MANAGEMENT/SOCIAL WORK
Continued Stay Note  [facility]     Patient Name: Britney Raman  MRN: [id number]  Today's Date: 4/30/2025    Admit Date: 4/28/2025    Plan: home   Discharge Plan       Row Name 04/30/25 1252       Plan    Plan home    Patient/Family in Agreement with Plan yes    Plan Comments Met with patient at the bedside to discuss discharge plan. Patient's plan is home.  will transport. ID is following. CM will continue to follow and assist with discharge planning as recommendations become available.    Final Discharge Disposition Code 01 - home or self-care                   Discharge Codes    No documentation.                 Expected Discharge Date and Time       Expected Discharge Date Expected Discharge Time    Apr 29, 2025               Teja Brambila RN

## 2025-04-30 NOTE — PLAN OF CARE
Problem: Adult Inpatient Plan of Care  Goal: Plan of Care Review  4/30/2025 0638 by Rosana Emmanuel RN  Outcome: Progressing  4/30/2025 0138 by Rosana Emmanuel RN  Outcome: Progressing  Goal: Patient-Specific Goal (Individualized)  4/30/2025 0638 by Rosana Emmanuel RN  Outcome: Progressing  4/30/2025 0138 by Rosana Emmanuel RN  Outcome: Progressing  Goal: Absence of Hospital-Acquired Illness or Injury  4/30/2025 0638 by Rosana Emmanuel RN  Outcome: Progressing  4/30/2025 0138 by Rosana Emmanuel RN  Outcome: Progressing  Intervention: Identify and Manage Fall Risk  Recent Flowsheet Documentation  Taken 4/30/2025 0600 by Rosana Emmanuel RN  Safety Promotion/Fall Prevention: safety round/check completed  Taken 4/30/2025 0400 by Rosana Emmanuel RN  Safety Promotion/Fall Prevention: safety round/check completed  Taken 4/30/2025 0200 by Rosana Emmanuel RN  Safety Promotion/Fall Prevention: safety round/check completed  Taken 4/30/2025 0000 by Rosana Emmanuel RN  Safety Promotion/Fall Prevention:   toileting scheduled   safety round/check completed   room organization consistent   nonskid shoes/slippers when out of bed  Taken 4/29/2025 2200 by Rosana Emmanuel RN  Safety Promotion/Fall Prevention:   toileting scheduled   safety round/check completed   room organization consistent   nonskid shoes/slippers when out of bed  Taken 4/29/2025 1941 by Rosana Emmanuel RN  Safety Promotion/Fall Prevention:   safety round/check completed   room organization consistent   nonskid shoes/slippers when out of bed   toileting scheduled  Intervention: Prevent Skin Injury  Recent Flowsheet Documentation  Taken 4/30/2025 0600 by Rosana Emmanuel RN  Body Position: position changed independently  Taken 4/30/2025 0400 by Rosana Emmanuel RN  Body Position: position changed independently  Taken  4/30/2025 0200 by Rosana Emmanuel RN  Body Position: position changed independently  Taken 4/30/2025 0000 by Rosana Emmanuel RN  Body Position: position changed independently  Taken 4/29/2025 2200 by Rosana Emmanuel RN  Body Position: position changed independently  Taken 4/29/2025 1941 by Rosana Emmanuel RN  Body Position: sitting up in bed  Intervention: Prevent Infection  Recent Flowsheet Documentation  Taken 4/30/2025 0600 by Rosana Emmanuel RN  Infection Prevention: environmental surveillance performed  Taken 4/30/2025 0400 by Rosana Emmanuel RN  Infection Prevention: environmental surveillance performed  Taken 4/30/2025 0200 by Rosana Emmanuel RN  Infection Prevention: environmental surveillance performed  Taken 4/30/2025 0000 by Rosana Emmanuel RN  Infection Prevention: environmental surveillance performed  Taken 4/29/2025 2200 by Rosana Emmanuel RN  Infection Prevention: environmental surveillance performed  Taken 4/29/2025 1941 by Rosana Emmanuel RN  Infection Prevention:   hand hygiene promoted   environmental surveillance performed  Goal: Optimal Comfort and Wellbeing  4/30/2025 0638 by Rosana Emmanuel RN  Outcome: Progressing  4/30/2025 0138 by Rosana Emmanuel RN  Outcome: Progressing  Intervention: Provide Person-Centered Care  Recent Flowsheet Documentation  Taken 4/30/2025 0600 by Rosana Emmanuel RN  Trust Relationship/Rapport:   care explained   choices provided   emotional support provided  Taken 4/30/2025 0400 by Rosana Emmanuel RN  Trust Relationship/Rapport:   care explained   choices provided   emotional support provided  Taken 4/30/2025 0200 by Rosana Emmanuel RN  Trust Relationship/Rapport:   care explained   choices provided   emotional support provided  Taken 4/30/2025 0000 by Rosana Emmanuel RN  Trust  Relationship/Rapport:   choices provided   care explained   emotional support provided  Taken 4/29/2025 2200 by Rosana Emmanuel RN  Trust Relationship/Rapport:   care explained   choices provided   emotional support provided  Taken 4/29/2025 1941 by Rosana Emmanuel RN  Trust Relationship/Rapport:   care explained   choices provided   emotional support provided   empathic listening provided  Goal: Readiness for Transition of Care  4/30/2025 0638 by Rosana Emmanuel RN  Outcome: Progressing  4/30/2025 0138 by Rosana Emmanuel RN  Outcome: Progressing     Problem: Fall Injury Risk  Goal: Absence of Fall and Fall-Related Injury  4/30/2025 0638 by Rosana Emmanuel RN  Outcome: Progressing  4/30/2025 0138 by Rosana Emmanuel RN  Outcome: Progressing  Intervention: Identify and Manage Contributors  Recent Flowsheet Documentation  Taken 4/29/2025 1941 by Rosana Emmanuel RN  Medication Review/Management: medications reviewed  Intervention: Promote Injury-Free Environment  Recent Flowsheet Documentation  Taken 4/30/2025 0600 by Rosana Emmanuel RN  Safety Promotion/Fall Prevention: safety round/check completed  Taken 4/30/2025 0400 by Rosana Emmanuel RN  Safety Promotion/Fall Prevention: safety round/check completed  Taken 4/30/2025 0200 by Rosana Emmanuel RN  Safety Promotion/Fall Prevention: safety round/check completed  Taken 4/30/2025 0000 by Rosana Emmanuel RN  Safety Promotion/Fall Prevention:   toileting scheduled   safety round/check completed   room organization consistent   nonskid shoes/slippers when out of bed  Taken 4/29/2025 2200 by Rosana Emmanuel RN  Safety Promotion/Fall Prevention:   toileting scheduled   safety round/check completed   room organization consistent   nonskid shoes/slippers when out of bed  Taken 4/29/2025 1941 by Rosana Emmanuel, RN  Safety Promotion/Fall  Prevention:   safety round/check completed   room organization consistent   nonskid shoes/slippers when out of bed   toileting scheduled     Problem: Surgery Nonspecified  Goal: Absence of Bleeding  4/30/2025 0638 by Rosana Emmanuel RN  Outcome: Progressing  4/30/2025 0138 by Rosana Emmanuel RN  Outcome: Progressing  Goal: Effective Bowel Elimination  4/30/2025 0638 by Rosana Emmanuel RN  Outcome: Progressing  4/30/2025 0138 by Rosana Emmanuel RN  Outcome: Progressing  Goal: Fluid and Electrolyte Balance  4/30/2025 0638 by Rosana Emmanuel RN  Outcome: Progressing  4/30/2025 0138 by Rosana Emmanuel RN  Outcome: Progressing  Goal: Blood Glucose Level Within Target Range  4/30/2025 0638 by Rosana Emmanuel RN  Outcome: Progressing  4/30/2025 0138 by Rosana Emmanuel RN  Outcome: Progressing  Goal: Absence of Infection Signs and Symptoms  4/30/2025 0638 by Rosana Emmanuel RN  Outcome: Progressing  4/30/2025 0138 by Rosana Emmanuel RN  Outcome: Progressing  Goal: Anesthesia/Sedation Recovery  4/30/2025 0638 by Rosana Emmanuel RN  Outcome: Progressing  4/30/2025 0138 by Rosana Emmanuel RN  Outcome: Progressing  Intervention: Optimize Anesthesia Recovery  Recent Flowsheet Documentation  Taken 4/30/2025 0600 by Rosana Emmanuel RN  Safety Promotion/Fall Prevention: safety round/check completed  Taken 4/30/2025 0400 by Rosana Emmanuel RN  Safety Promotion/Fall Prevention: safety round/check completed  Taken 4/30/2025 0200 by Rosana Emmanuel RN  Safety Promotion/Fall Prevention: safety round/check completed  Taken 4/30/2025 0000 by Rosana Emmanuel RN  Safety Promotion/Fall Prevention:   toileting scheduled   safety round/check completed   room organization consistent   nonskid shoes/slippers when out of bed  Taken 4/29/2025 2200 by Laureano Colindres  LAMIN Wayne  Safety Promotion/Fall Prevention:   toileting scheduled   safety round/check completed   room organization consistent   nonskid shoes/slippers when out of bed  Taken 4/29/2025 1941 by Rosana Emmanuel RN  Safety Promotion/Fall Prevention:   safety round/check completed   room organization consistent   nonskid shoes/slippers when out of bed   toileting scheduled  Goal: Optimal Pain Control and Function  4/30/2025 0638 by Rosana Emmanuel RN  Outcome: Progressing  4/30/2025 0138 by Rosana Emmanuel RN  Outcome: Progressing  Goal: Nausea and Vomiting Relief  4/30/2025 0638 by Rosana Emmanuel RN  Outcome: Progressing  4/30/2025 0138 by Rosana Emmanuel RN  Outcome: Progressing  Goal: Effective Urinary Elimination  4/30/2025 0638 by Rosana Emmanuel RN  Outcome: Progressing  4/30/2025 0138 by Rosana Emmanuel RN  Outcome: Progressing  Goal: Effective Oxygenation and Ventilation  4/30/2025 0638 by Rosana Emmanuel RN  Outcome: Progressing  4/30/2025 0138 by Rosana Emmanuel RN  Outcome: Progressing  Intervention: Optimize Oxygenation and Ventilation  Recent Flowsheet Documentation  Taken 4/30/2025 0600 by Rosana Emmanuel RN  Head of Bed ([facility]) Positioning: HOB elevated  Taken 4/30/2025 0400 by Rosana Emmanuel RN  Head of Bed ([facility]) Positioning: HOB elevated  Taken 4/30/2025 0200 by Rosana Emmanuel RN  Head of Bed ([facility]) Positioning: HOB elevated  Taken 4/30/2025 0000 by Rosana Emmanuel RN  Head of Bed ([facility]) Positioning: HOB elevated  Taken 4/29/2025 2200 by Rosana Emmanuel RN  Head of Bed ([facility]) Positioning: HOB elevated  Taken 4/29/2025 1941 by Rosana Emmanuel RN  Head of Bed ([facility]) Positioning: HOB elevated   Goal Outcome Evaluation:      Pt has been up to the bathroom multiple times. VSS. No complaints of pain. Call light within reach.

## 2025-04-30 NOTE — PROGRESS NOTES
"IM progress note      Britney Raman  [id number]  1960     LOS: 1 day     Attending: Juan David Sunshine MD    Primary Care Provider: Fatou Vera MD      Chief Complaint/Reason for visit:  No chief complaint on file.      Subjective   Doing well. Good pain control. Denies f/c/n/v/sob/cp.    Objective        Visit Vitals  /64 (BP Location: Right arm, Patient Position: Lying)   Pulse 83   Temp 98.4 °F (36.9 °C) (Oral)   Resp 16   Ht 170.2 cm (67\")   Wt 63.5 kg (140 lb)   SpO2 99%   BMI 21.93 kg/m²     Temp (24hrs), Av °F (36.7 °C), Min:97.5 °F (36.4 °C), Max:98.4 °F (36.9 °C)    Occupational therapy: Pt. educated on brenda dressing technique for UBD, Sling mgmt, and infraclavicular nerve catheter mgmt during ADLs and functional mobility. Family present for education. Demonstates bed mobility with Min A and T/Fs with CGA. Completes toileting with CGA, UBD with Max A, and grooming with Min A. Skilled OT services warranted to promote return to PLOF. Recommend home with 24/7 assist at discharge.       Physical Exam:     General Appearance:    Alert, cooperative, in no acute distress   Head:    Normocephalic, without obvious abnormality, atraumatic    Lungs:     Normal effort, symmetric chest rise,  clear to      auscultation bilaterally              Heart:    Regular rhythm and normal rate, normal S1 and S2    Abdomen:     Normal bowel sounds, no masses, no organomegaly, soft        nontender, nondistended, no guarding, no rebound                tenderness   Extremities: Left upper extremity in a sling.  Peripheral nerve block catheter present.  Dressing intact and clean.  Decreased movement and sensation in the hand due to block.  No clubbing, cyanosis or edema.  No deformities.    Pulses:   Pulses palpable and equal bilaterally   Skin:   No bleeding, bruising or rash          Results Review:     I reviewed the patient's new clinical results.   Results from last 7 days   Lab Units 25  1231   WBC " 10*3/mm3 11.13*   HEMOGLOBIN g/dL 8.7*   HEMATOCRIT % 31.4*   PLATELETS 10*3/mm3 247     Results from last 7 days   Lab Units 04/30/25  1230 04/29/25  0852   SODIUM mmol/L 133* 132*   POTASSIUM mmol/L 3.3* 3.6   CHLORIDE mmol/L 102 98   CO2 mmol/L 17.0* 16.0*   BUN mg/dL 13 17   CREATININE mg/dL 1.57* 1.85*   CALCIUM mg/dL 7.8* 8.2*   BILIRUBIN mg/dL  --  0.2   ALK PHOS U/L  --  60   ALT (SGPT) U/L  --  8   AST (SGOT) U/L  --  18   GLUCOSE mg/dL 123* 160*     Microbiology Results (last 21 days)    Collected Updated Procedure Result Status    04/28/2025 1419 04/28/2025 2049 Fungus Culture - Tissue, Elbow, Left [[id number]]   Tissue from Elbow, Left    In process Component Value   No component results             04/28/2025 1419 04/30/2025 0712 Tissue / Bone Culture - Tissue, Elbow, Left [[id number]]   Tissue from Elbow, Left    Preliminary result Component Value   Tissue Culture No growth at 2 days P   Gram Stain Few (2+) WBCs per low power field P    Few (2+) Gram positive cocci in pairs and clusters P             04/28/2025 1419 04/29/2025 1200 AFB Culture - Tissue, Elbow, Left [[id number]]   Tissue from Elbow, Left    Preliminary result Component Value   AFB Stain No acid fast bacilli seen on concentrated smear P             04/28/2025 1419 04/28/2025 2049 Anaerobic Culture 10 Day Incubation - Tissue, Elbow, Left [[id number]]   Tissue from Elbow, Left    In process Component Value   No component results             04/28/2025 1403 04/28/2025 2049 Fungus Culture - Tissue, Elbow, Left [[id number]]   Tissue from Elbow, Left    In process Component Value   No component results             04/28/2025 1403 04/30/2025 0712 Tissue / Bone Culture - Tissue, Elbow, Left [[id number]]   Tissue from Elbow, Left    Preliminary result Component Value   Tissue Culture No growth at 2 days P   Gram Stain Many (4+) WBCs per low power field P    No organisms seen P             04/28/2025 1403 04/29/2025 1200 AFB Culture - Tissue, Elbow,  Left [[id number]]   Tissue from Elbow, Left    Preliminary result Component Value   AFB Stain No acid fast bacilli seen on concentrated smear P             04/28/2025 1403 04/28/2025 2049 Anaerobic Culture 10 Day Incubation - Tissue, Elbow, Left [[id number]]   Tissue from Elbow, Left    In process Component Value   No component results             04/28/2025 1400 04/28/2025 2049 Fungus Culture - Tissue, Elbow, Left [[id number]]   Tissue from Elbow, Left    In process Component Value   No component results             04/28/2025 1400 04/30/2025 0711 Tissue / Bone Culture - Tissue, Elbow, Left [[id number]]   Tissue from Elbow, Left    Preliminary result Component Value   Tissue Culture No growth at 2 days P   Gram Stain Moderate (3+) WBCs per low power field P    No organisms seen P             04/28/2025 1400 04/29/2025 1200 AFB Culture - Tissue, Elbow, Left [[id number]]   Tissue from Elbow, Left    Preliminary result Component Value   AFB Stain No acid fast bacilli seen on concentrated smear P             04/28/2025 1400 04/28/2025 2049 Anaerobic Culture 10 Day Incubation - Tissue, Elbow, Left [[id number]]   Tissue from Elbow, Left    In process Component Value   No component results             04/18/2025 1308 04/23/2025 1330 Blood Culture - Blood, Arm, Right [[id number]]   Blood from Arm, Right    Final result Component Value   Blood Culture No growth at 5 days          I reviewed the patient's new imaging including images and reports.    All medications reviewed.   cefTRIAXone, 2,000 mg, Intravenous, Q24H  citalopram, 40 mg, Oral, Daily  DAPTOmycin, 8 mg/kg, Intravenous, Q24H  everolimus, 5 mg, Oral, Daily  exemestane, 25 mg, Oral, Daily  metoprolol succinate XL, 100 mg, Oral, Daily  sodium bicarbonate, 1,300 mg, Oral, BID  sodium chloride, 10 mL, Intravenous, Q12H  sodium chloride, 10 mL, Intravenous, Q12H      HYDROmorphone, 0.5 mg, Q2H PRN   And  naloxone, 0.1 mg, Q5 Min PRN  labetalol, 10 mg, Q4H PRN  sodium  chloride, 500 mL, TID PRN  sodium chloride, 10 mL, PRN  sodium chloride, 10 mL, PRN  sodium chloride, 20 mL, PRN  sodium chloride, 40 mL, PRN  sodium chloride, 40 mL, PRN        Assessment & Plan       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 Continue occupational therapy  2. Pain control-prns, multimodal approach including peripheral nerve block catheter.   3. IS-encouraged  4. DVT proph-mechanicals  5. Bowel regimen  6. Monitor post-op labs, will try to obtain baseline labs from patient's PCP or oncologist  7. Discharge planning    - Hypertension:  Resume home medications as appropriate, formulary substitution when indicated.  Holding parameters.  PRN medications for elevated blood pressure.      -GERD:  Resume PPI.  Formulary substitution when indicated.     - Monitor operative cultures, perioperative antibiotics will be managed by Dr. Tavon Valentin with [facility], currently on ceftriaxone and daptomycin.    May not be a candidate for PICC line.  Her left upper extremity is not to be used due to status postmastectomy.  Likely has kidney disease with creatinine too high to proceed with a PICC line.    She does have a port that could be used for antibiotics if approved by her oncologist.    Complex medical decision making.      Shae Whitt, APRN  04/30/25  14:54 EDT

## 2025-04-30 NOTE — PROGRESS NOTES
"Orthopedic Daily Progress Note      CC: Comfortable    Pain controlled  General: no fevers, chills  Abdomen: no nausea, vomiting, or diarrhea    No other complaints    Physical Exam:  I have reviewed the vital signs.  Temp:  [97.5 °F (36.4 °C)-98.4 °F (36.9 °C)] 98.4 °F (36.9 °C)  Heart Rate:  [77-91] 83  Resp:  [16] 16  BP: (134-146)/(64-80) 135/64    Objective:  Vital signs: (most recent): Blood pressure 135/64, pulse 83, temperature 98.4 °F (36.9 °C), temperature source Oral, resp. rate 16, height 170.2 cm (67\"), weight 63.5 kg (140 lb), SpO2 99%.              General Appearance:    Alert, cooperative, no distress  Extremities: No clubbing, cyanosis, or edema to lower extremities  Pulses:  2+ in distal surgical extremity  Skin: Dressing Clean/dry/intact      Results Review:    I have reviewed the labs, radiology results and diagnostic studies:yes        Results from last 7 days   Lab Units 04/29/25  0852   SODIUM mmol/L 132*   POTASSIUM mmol/L 3.6   CO2 mmol/L 16.0*   CREATININE mg/dL 1.85*   GLUCOSE mg/dL 160*       I have reviewed the medications.    Assessment/Problem List  POD# 2 S/p I/D, revision L TEA for infection  -cultures pending but with gm + cocci  -IV access a challenge  -Home when IV access and antibiotics arranged      Juan David Sunshine MD  04/30/25  11:50 EDT            "

## 2025-04-30 NOTE — PLAN OF CARE
Goal Outcome Evaluation:  Plan of Care Reviewed With: patient, spouse           Outcome Evaluation: OT educated pt and spouse on ADL retraining for comfort and HEP. She tolerated gentle AAROM LUE and declined out of bed activity d/t fatigue. Family will provide 24/7 assist, anticipate DC home with family assist.    Anticipated Discharge Disposition (OT): home with 24/7 care

## 2025-04-30 NOTE — THERAPY TREATMENT NOTE
Patient Name: Britney Raman  : 1960    MRN: [id number]                              Today's Date: 2025       Admit Date: 2025    Visit Dx:     ICD-10-CM ICD-9-CM   1. Infection of prosthetic shoulder joint, initial encounter  T84.59XA 996.66    Z96.619 V43.61   2. 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, initial encounter    Infection of prosthetic shoulder joint     Past Medical History:   Diagnosis Date    Arthritis     Breast cancer 2018    Left - had mastectomy and subsequent chemo and radiation - new diagnosis () of right breast CA and mets to spine    Cataract     MILD    GERD (gastroesophageal reflux disease)     Hx MRSA infection 2018    right arm x 2 and right leg; most recent treatment 2018    Knee pain     LEFT MAINLY    PONV (postoperative nausea and vomiting)     AFTER BREAST SURGERY- HAD WATER AND THREW IT UP IMMEDIATELY    Urinary incontinence in female     UTI (urinary tract infection)     Wears glasses      Past Surgical History:   Procedure Laterality Date    BREAST BIOPSY Bilateral     DIAGNOSTIC LAPAROSCOPY      ENDOSCOPY      EYE SURGERY Bilateral     AS CHILD    INJECTION OF MEDICATION      X7 LEFT KNEE    MASTECTOMY Left 2018    PORTACATH PLACEMENT      TOTAL ELBOW ARTHROPLASTY Left 2022    Procedure: TOTAL ELBOW ARTHROPLASTY FOR FRACTURE,  ULNAR NERVE TRANSPOSITION;  Surgeon: Juan David Sunshine MD;  Location: [facility] OR;  Service: Orthopedics;  Laterality: Left;    TOTAL ELBOW ARTHROPLASTY Left 2025    Procedure: TOTAL ELBOW REVISION, IRRIGATION AND DEBRIDEMENT LEFT;  Surgeon: Juan David Sunshine MD;  Location: [facility] OR;  Service: Orthopedics;   Laterality: Left;    TUBAL ABDOMINAL LIGATION        General Information       [place] [facility] Name 04/30/25 0958          Physical Therapy Time and Intention    Document Type therapy note (daily note)  -     Mode of Treatment physical therapy  -[facility] Name 04/30/25 0958          General Information    Patient Profile Reviewed yes  -     Existing Precautions/Restrictions fall;left;other (see comments)  Infraclavicular catheter, L UE NWB  -     Barriers to Rehab medically complex;previous functional deficit  -[facility] Name 04/30/25 0958          Cognition    Orientation Status (Cognition) oriented x 3  -[facility] Name 04/30/25 0958          Safety Issues/Impairments Affecting Functional Mobility    Safety Issues Affecting Function (Mobility) awareness of need for assistance;insight into deficits/self-awareness;safety precaution awareness;safety precautions follow-through/compliance  -     Impairments Affecting Function (Mobility) endurance/activity tolerance;balance;postural/trunk control;strength;sensation/sensory awareness  -               User Key  (r) = Recorded By, (t) = Taken By, (c) = Cosigned By      Initials Name Provider Type     Carole Ruggiero PT Physical Therapist                   Mobility       [place] [facility] Name 04/30/25 1002          Bed Mobility    Bed Mobility supine-sit  -     Supine-Sit [place] (Bed Mobility) minimum assist (75% patient effort);verbal cues  -     Assistive Device (Bed Mobility) head of bed elevated  -     Comment, (Bed Mobility) VCs for hand placement and sequencing. Performed w/ HOB elevated as pt has adjustable bed at home  -[facility] Name 04/30/25 1002          Transfers    Comment, (Transfers) VCs for hand placement and sequencing.  -[facility] Name 04/30/25 1002          Sit-Stand Transfer    Sit-Stand [place] (Transfers) contact guard;verbal cues  -     Comment, (Sit-Stand Transfer) STS from EOB and toilet  -[facility] Name 04/30/25 1002           Gait/Stairs (Locomotion)    [place] Level (Gait) contact guard;verbal cues  -     Assistive Device (Gait) other (see comments)  R HHA  -     Patient was able to Ambulate yes  -     Distance in Feet (Gait) 280  -     Deviations/Abnormal Patterns (Gait) bilateral deviations;jenifer decreased;gait speed decreased;stride length decreased  -     Bilateral Gait Deviations forward flexed posture;heel strike decreased  -     Comment, (Gait/Stairs) Pt demo sep through gait pattern w/ decreased step length and narrow RENEE. Continued VCs for safety awareness as pt gets distracted. Mildly unsteady w/o overt LOB. Pt reports ambulating better w/ shoes donned d/t neuropathy. Intermittent R HHA. Distance limited by fatigue  -       Row Name 04/30/25 1002          Mobility    Extremity Weight-bearing Status left upper extremity  -     Left Upper Extremity (Weight-bearing Status) non weight-bearing (NWB)  -               User Key  (r) = Recorded By, (t) = Taken By, (c) = Cosigned By      Initials Name Provider Type    Carole Yuen PT Physical Therapist                   Obj/Interventions       Row Name 04/30/25 1006          Balance    Balance Assessment sitting static balance;sitting dynamic balance;standing static balance;standing dynamic balance  -     Static Sitting Balance standby assist  -     Dynamic Sitting Balance standby assist  -     Position, Sitting Balance unsupported;sitting in chair  -     Static Standing Balance contact guard  -     Dynamic Standing Balance contact guard;verbal cues  -     Position/Device Used, Standing Balance supported;unsupported;other (see comments)  intermittent R HHA  -     Balance Interventions sitting;standing;sit to stand;supported;static;dynamic  -               User Key  (r) = Recorded By, (t) = Taken By, (c) = Cosigned By      Initials Name Provider Type    Carole Yuen PT Physical Therapist                   Goals/Plan    No  documentation.                  Clinical Impression       Row Name 04/30/25 1006          Pain    Pretreatment Pain Rating 3/10  -     Posttreatment Pain Rating 0/10 - no pain  -     Pain Location extremity  -     Pain Side/Orientation left;upper  -     Pain Management Interventions activity modification encouraged;exercise or physical activity utilized;positioning techniques utilized;cold applied  -     Response to Pain Interventions activity level improved;functional ability improved;activity participation with decreased pain  -LH       Row Name 04/30/25 1006          Plan of Care Review    Plan of Care Reviewed With patient;spouse  -     Progress improving  -     Outcome Evaluation Pt increased ambulation distance to 280 ft w/ intermittent R HHA, CGA. She continues demo mild unsteadiness w/o overt LOB. Pt would continue to benefit from IP PT services while hospitalized to address deficits. Recommend home w/ 24/7 assist and HHPT at d/c.  -[facility] 04/30/25 1006          Vital Signs    Pre Systolic BP Rehab 144  -     Pre Treatment Diastolic BP 76  -LH     Pre SpO2 (%) 100  -     O2 Delivery Pre Treatment room air  -     Post SpO2 (%) 99  -     O2 Delivery Post Treatment room air  -     Pre Patient Position Supine  -     Post Patient Position Sitting  -LH       Row Name 04/30/25 1006          Positioning and Restraints    Pre-Treatment Position in bed  -     Post Treatment Position chair  -     In Chair notified nsg;reclined;sitting;call light within reach;encouraged to call for assist;exit alarm on;with family/caregiver;waffle cushion;legs elevated;LUE elevated  -               User Key  (r) = Recorded By, (t) = Taken By, (c) = Cosigned By      Initials Name Provider Type     Carole Ruggiero, PT Physical Therapist                   Outcome Measures       [facility] Name 04/30/25 1008 04/30/25 0100       How much help from another person do you currently need...    Turning from  your back to your side while in flat bed without using bedrails? 3  - 4  -AH    Moving from lying on back to sitting on the side of a flat bed without bedrails? 3  - 3  -AH    Moving to and from a bed to a chair (including a wheelchair)? 3  - 3  -AH    Standing up from a chair using your arms (e.g., wheelchair, bedside chair)? 3  - 3  -AH    Climbing 3-5 steps with a railing? 2  - 3  -AH    To walk in hospital room? 3  - 3  -AH    AM-PAC 6 Clicks Score (PT) 17  - 19  -    Highest Level of Mobility Goal 5 --> Static standing  - 6 --> Walk 10 steps or more  -      Row Name 04/30/25 1008          Functional Assessment    Outcome Measure Options AM-PAC 6 Clicks Basic Mobility (PT)  -               User Key  (r) = Recorded By, (t) = Taken By, (c) = Cosigned By      Initials Name Provider Type     Carole Ruggiero, PT Physical Therapist     Rosana Emmanuel, RN Registered Nurse                                 Physical Therapy Education       Title: PT OT SLP Therapies (Done)       Topic: Physical Therapy (Done)       Point: Mobility training (Done)       Learning Progress Summary            Patient Acceptance, E, VU,NR by  at 4/30/2025 1011    Acceptance, E, VU,NR by  at 4/29/2025 0944    Acceptance, E, VU by BC at 4/29/2025 0810   Significant Other Acceptance, E, VU,NR by  at 4/29/2025 0944                      Point: Home exercise program (Done)       Learning Progress Summary            Patient Acceptance, E, VU by BC at 4/29/2025 0810                      Point: Body mechanics (Done)       Learning Progress Summary            Patient Acceptance, E, VU,NR by  at 4/30/2025 1011    Acceptance, E, VU,NR by  at 4/29/2025 0944    Acceptance, E, VU by BC at 4/29/2025 0810   Significant Other Acceptance, E, VU,NR by  at 4/29/2025 0944                      Point: Precautions (Done)       Learning Progress Summary            Patient Acceptance, E, VU,NR by  at 4/30/2025 1011     Acceptance, E, VU,NR by  at 4/29/2025 0944    Acceptance, E, VU by BC at 4/29/2025 0810   Significant Other Acceptance, E, VU,NR by  at 4/29/2025 0944                                      User Key       Initials Effective Dates Name Provider Type [facility] 04/27/21 -  Lucy Frazier, RN Registered Nurse Nurse     09/21/23 -  Carole Ruggiero, PT Physical Therapist PT                  PT Recommendation and Plan  Planned Therapy Interventions (PT): balance training, bed mobility training, gait training, home exercise program, neuromuscular re-education, patient/family education, postural re-education, ROM (range of motion), strengthening, stretching, transfer training  Progress: improving  Outcome Evaluation: Pt increased ambulation distance to 280 ft w/ intermittent R HHA, CGA. She continues demo mild unsteadiness w/o overt LOB. Pt would continue to benefit from IP PT services while hospitalized to address deficits. Recommend home w/ 24/7 assist and HHPT at d/c.     Time Calculation:   PT Evaluation Complexity  History, PT Evaluation Complexity: 3 or more personal factors and/or comorbidities  Examination of Body Systems (PT Eval Complexity): total of 4 or more elements  Clinical Presentation (PT Evaluation Complexity): evolving  Clinical Decision Making (PT Evaluation Complexity): moderate complexity  Overall Complexity (PT Evaluation Complexity): moderate complexity     PT Charges       Row Name 04/30/25 1011             Time Calculation    Start Time 0828  -      PT Received On 04/30/25  -      PT Goal Re-Cert Due Date 05/09/25  -         Timed Charges    00752 - Gait Training Minutes  13  -      30207 - PT Therapeutic Activity Minutes 11  -         Total Minutes    Timed Charges Total Minutes 24  -       Total Minutes 24  -                User Key  (r) = Recorded By, (t) = Taken By, (c) = Cosigned By      Initials Name Provider Type     Carole Ruggiero, PT Physical Therapist                   Therapy Charges for Today       Code Description Service Date Service Provider Modifiers Qty    12172755656 HC PT EVAL MOD COMPLEXITY 4 4/29/2025 Carole Ruggiero, PT GP 1    48616265108 HC GAIT TRAINING EA 15 MIN 4/30/2025 Carole Ruggiero, PT GP 1    08216938619 HC PT THERAPEUTIC ACT EA 15 MIN 4/30/2025 Carole Ruggiero, PT GP 1            PT G-Codes  Outcome Measure Options: AM-PAC 6 Clicks Basic Mobility (PT)  AM-PAC 6 Clicks Score (PT): 17  AM-PAC 6 Clicks Score (OT): 15  PT Discharge Summary  Anticipated Discharge Disposition (PT): home with 24/7 care, home with home health    Carole Ruggiero, PT  4/30/2025

## 2025-05-01 VITALS
OXYGEN SATURATION: 97 % | DIASTOLIC BLOOD PRESSURE: 58 MMHG | HEIGHT: 67 IN | TEMPERATURE: 98 F | WEIGHT: 140 LBS | RESPIRATION RATE: 16 BRPM | HEART RATE: 88 BPM | SYSTOLIC BLOOD PRESSURE: 128 MMHG | BODY MASS INDEX: 21.97 KG/M2

## 2025-05-01 LAB
BACTERIA SPEC AEROBE CULT: NORMAL
GRAM STN SPEC: NORMAL

## 2025-05-01 PROCEDURE — A9270 NON-COVERED ITEM OR SERVICE: HCPCS | Performed by: INTERNAL MEDICINE

## 2025-05-01 PROCEDURE — 97530 THERAPEUTIC ACTIVITIES: CPT

## 2025-05-01 PROCEDURE — 25010000002 CEFTRIAXONE PER 250 MG: Performed by: INTERNAL MEDICINE

## 2025-05-01 PROCEDURE — 63710000001 METOPROLOL SUCCINATE XL 100 MG TABLET SUSTAINED-RELEASE 24 HOUR: Performed by: INTERNAL MEDICINE

## 2025-05-01 PROCEDURE — 63710000001 CITALOPRAM 40 MG TABLET: Performed by: INTERNAL MEDICINE

## 2025-05-01 PROCEDURE — 63710000001 SODIUM BICARBONATE 650 MG TABLET: Performed by: INTERNAL MEDICINE

## 2025-05-01 RX ORDER — ROPIVACAINE HYDROCHLORIDE 2 MG/ML
2 INJECTION, SOLUTION EPIDURAL; INFILTRATION; PERINEURAL CONTINUOUS
Start: 2025-05-01

## 2025-05-01 RX ADMIN — EVEROLIMUS 5 MG: 5 TABLET ORAL at 08:55

## 2025-05-01 RX ADMIN — METOPROLOL SUCCINATE 100 MG: 100 TABLET, EXTENDED RELEASE ORAL at 08:56

## 2025-05-01 RX ADMIN — SODIUM BICARBONATE 650 MG TABLET 1300 MG: at 08:56

## 2025-05-01 RX ADMIN — CEFTRIAXONE 2000 MG: 2 INJECTION, POWDER, FOR SOLUTION INTRAMUSCULAR; INTRAVENOUS at 12:42

## 2025-05-01 RX ADMIN — CITALOPRAM HYDROBROMIDE 40 MG: 40 TABLET ORAL at 08:56

## 2025-05-01 RX ADMIN — Medication 10 ML: at 09:01

## 2025-05-01 RX ADMIN — EXEMESTANE 25 MG: 25 TABLET ORAL at 08:55

## 2025-05-01 NOTE — PROGRESS NOTES
"IM progress note      Britney Raman  [id number]  1960     LOS: 1 day     Attending: Juan David Sunshine MD    Primary Care Provider: Fatou Vera MD      Chief Complaint/Reason for visit:  No chief complaint on file.      Subjective   Doing well. Good pain control.   Denies f/c/n/v/sob/cp.  Had diarrhea stool yesterday.     Objective        Visit Vitals  /69 (BP Location: Right leg, Patient Position: Lying)   Pulse 90   Temp 97.4 °F (36.3 °C) (Oral)   Resp 16   Ht 170.2 cm (67\")   Wt 63.5 kg (140 lb)   SpO2 98%   BMI 21.93 kg/m²     Temp (24hrs), Av °F (36.7 °C), Min:97.1 °F (36.2 °C), Max:98.8 °F (37.1 °C)    Occupational therapy:   Shae Merida OT   Occupational Therapist  Occupational Therapy     Plan of Care     Signed     Date of Service: 25  Creation Time: 25     Signed         Goal Outcome Evaluation:  Plan of Care Reviewed With: patient, spouse  Outcome Evaluation: OT educated pt and spouse on ADL retraining for comfort and HEP. She tolerated gentle AAROM LUE and declined out of bed activity d/t fatigue. Family will provide 24/7 assist, anticipate DC home with family assist.     Anticipated Discharge Disposition (OT): home with 24/7 care                      Physical Exam:     General Appearance:    Alert, cooperative, in no acute distress   Head:    Normocephalic, without obvious abnormality, atraumatic    Lungs:     Normal effort, symmetric chest rise,  clear to      auscultation bilaterally              Heart:    Regular rhythm and normal rate, normal S1 and S2    Abdomen:     Normal bowel sounds, no masses, no organomegaly, soft        nontender, nondistended, no guarding, no rebound                tenderness   Extremities: Left upper extremity in a sling.  Peripheral nerve block catheter present.  Dressing intact and clean.  Decreased movement and sensation in the hand    No clubbing, cyanosis or edema.  No deformities.    Pulses:   Pulses palpable and " equal bilaterally   Skin:   No bleeding, bruising or rash          Results Review:     I reviewed the patient's new clinical results.   Results from last 7 days   Lab Units 04/30/25  1231   WBC 10*3/mm3 11.13*   HEMOGLOBIN g/dL 8.7*   HEMATOCRIT % 31.4*   PLATELETS 10*3/mm3 247     Results from last 7 days   Lab Units 04/30/25  1230 04/29/25  0852   SODIUM mmol/L 133* 132*   POTASSIUM mmol/L 3.3* 3.6   CHLORIDE mmol/L 102 98   CO2 mmol/L 17.0* 16.0*   BUN mg/dL 13 17   CREATININE mg/dL 1.57* 1.85*   CALCIUM mg/dL 7.8* 8.2*   BILIRUBIN mg/dL  --  0.2   ALK PHOS U/L  --  60   ALT (SGPT) U/L  --  8   AST (SGOT) U/L  --  18   GLUCOSE mg/dL 123* 160*     Microbiology Results (last 21 days)    Collected Updated Procedure Result Status    04/28/2025 1419 04/28/2025 2049 Fungus Culture - Tissue, Elbow, Left [[id number]]   Tissue from Elbow, Left    In process Component Value   No component results             04/28/2025 1419 04/30/2025 0712 Tissue / Bone Culture - Tissue, Elbow, Left [[id number]]   Tissue from Elbow, Left    Preliminary result Component Value   Tissue Culture No growth at 2 days P   Gram Stain Few (2+) WBCs per low power field P    Few (2+) Gram positive cocci in pairs and clusters P             04/28/2025 1419 04/29/2025 1200 AFB Culture - Tissue, Elbow, Left [[id number]]   Tissue from Elbow, Left    Preliminary result Component Value   AFB Stain No acid fast bacilli seen on concentrated smear P             04/28/2025 1419 04/28/2025 2049 Anaerobic Culture 10 Day Incubation - Tissue, Elbow, Left [[id number]]   Tissue from Elbow, Left    In process Component Value   No component results             04/28/2025 1403 04/28/2025 2049 Fungus Culture - Tissue, Elbow, Left [[id number]]   Tissue from Elbow, Left    In process Component Value   No component results             04/28/2025 1403 04/30/2025 0712 Tissue / Bone Culture - Tissue, Elbow, Left [[id number]]   Tissue from Elbow, Left    Preliminary result Component  Value   Tissue Culture No growth at 2 days P   Gram Stain Many (4+) WBCs per low power field P    No organisms seen P             04/28/2025 1403 04/29/2025 1200 AFB Culture - Tissue, Elbow, Left [[id number]]   Tissue from Elbow, Left    Preliminary result Component Value   AFB Stain No acid fast bacilli seen on concentrated smear P             04/28/2025 1403 04/28/2025 2049 Anaerobic Culture 10 Day Incubation - Tissue, Elbow, Left [[id number]]   Tissue from Elbow, Left    In process Component Value   No component results             04/28/2025 1400 04/28/2025 2049 Fungus Culture - Tissue, Elbow, Left [[id number]]   Tissue from Elbow, Left    In process Component Value   No component results             04/28/2025 1400 04/30/2025 0711 Tissue / Bone Culture - Tissue, Elbow, Left [[id number]]   Tissue from Elbow, Left    Preliminary result Component Value   Tissue Culture No growth at 2 days P   Gram Stain Moderate (3+) WBCs per low power field P    No organisms seen P             04/28/2025 1400 04/29/2025 1200 AFB Culture - Tissue, Elbow, Left [[id number]]   Tissue from Elbow, Left    Preliminary result Component Value   AFB Stain No acid fast bacilli seen on concentrated smear P             04/28/2025 1400 04/28/2025 2049 Anaerobic Culture 10 Day Incubation - Tissue, Elbow, Left [[id number]]   Tissue from Elbow, Left    In process Component Value   No component results             04/18/2025 1308 04/23/2025 1330 Blood Culture - Blood, Arm, Right [[id number]]   Blood from Arm, Right    Final result Component Value   Blood Culture No growth at 5 days          I reviewed the patient's new imaging including images and reports.    All medications reviewed.   cefTRIAXone, 2,000 mg, Intravenous, Q24H  citalopram, 40 mg, Oral, Daily  everolimus, 5 mg, Oral, Daily  exemestane, 25 mg, Oral, Daily  metoprolol succinate XL, 100 mg, Oral, Daily  sodium bicarbonate, 1,300 mg, Oral, BID  sodium chloride, 10 mL, Intravenous,  Q12H  sodium chloride, 10 mL, Intravenous, Q12H      HYDROmorphone, 0.5 mg, Q2H PRN   And  naloxone, 0.1 mg, Q5 Min PRN  labetalol, 10 mg, Q4H PRN  Potassium Replacement - Follow Nurse / BPA Driven Protocol, , PRN  sodium chloride, 500 mL, TID PRN  sodium chloride, 10 mL, PRN  sodium chloride, 10 mL, PRN  sodium chloride, 20 mL, PRN  sodium chloride, 40 mL, PRN  sodium chloride, 40 mL, PRN        Assessment & Plan       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   S/p PICC placement.      Plan  1. Continue occupational therapy  2. Pain control-prns, multimodal approach    3. IS-encouraged  4. DVT proph-mechanicals  5. Bowel regimen  6. Monitor post-op labs   7. Discharge planning, pending antibiotics arrangement.    - Hypertension:  Resume home medications as appropriate, formulary substitution when indicated.  Holding parameters.  PRN medications for elevated blood pressure.      -GERD:  Resume PPI.  Formulary substitution when indicated.     - Monitor operative cultures, perioperative antibiotics will be managed by Dr. Tavon Valentin with [facility], currently on ceftriaxone and daptomycin.    - PICC has been placed , renal function improvement is noted. And patient stated she would not take HD under any circumstance.     Complex medical decision making.      Freeman Perales MD  05/01/25  10:43 EDT

## 2025-05-01 NOTE — CASE MANAGEMENT/SOCIAL WORK
Continued Stay Note  [facility]     Patient Name: Britney Raman  MRN: [id number]  Today's Date: 5/1/2025    Admit Date: 4/28/2025    Plan: Home   Discharge Plan       Row Name 05/01/25 1124       Plan    Plan Home    Patient/Family in Agreement with Plan yes    Plan Comments Pt is being discharged home today.   received a call from Dr. Tavon Valentin. Pt will have PICC line dressing change, lab draws, and an IV antibiotic infusion weekly, in the [facility] office. Pt has appt for next Tuesday. [facility] will supply home IV antibiotics and teaching, today, at bedside. Pt's insurance covers her antibiotic 100%. CM will update pt, at bedside. Pt states she will be staying with her sister in law, tonight, in [place], [place]. Family to transport. No other needs voiced or identified.  IMM given and signed.    Final Discharge Disposition Code 01 - home or self-care                   Discharge Codes    No documentation.                 Expected Discharge Date and Time       Expected Discharge Date Expected Discharge Time    Apr 29, 2025               Skylar Cruz RN

## 2025-05-01 NOTE — PROGRESS NOTES
"Orthopedic Daily Progress Note      CC: None    Pain controlled  General: no fevers, chills  Abdomen: no nausea, vomiting, or diarrhea    No other complaints    Physical Exam:  I have reviewed the vital signs.  Temp:  [97.1 °F (36.2 °C)-98.8 °F (37.1 °C)] 97.4 °F (36.3 °C)  Heart Rate:  [77-90] 90  Resp:  [16] 16  BP: (135-158)/(64-74) 137/69    Objective:  Vital signs: (most recent): Blood pressure 137/69, pulse 90, temperature 97.4 °F (36.3 °C), temperature source Oral, resp. rate 16, height 170.2 cm (67\"), weight 63.5 kg (140 lb), SpO2 98%.              General Appearance:    Alert, cooperative, no distress  Extremities: No clubbing, cyanosis, or edema to lower extremities  Pulses:  2+ in distal surgical extremity  Skin: Incision Clean/dry/intact      Results Review:    I have reviewed the labs, radiology results and diagnostic studies:cultures still pending    Results from last 7 days   Lab Units 04/30/25  1231   WBC 10*3/mm3 11.13*   HEMOGLOBIN g/dL 8.7*   PLATELETS 10*3/mm3 247     Results from last 7 days   Lab Units 04/30/25  1230   SODIUM mmol/L 133*   POTASSIUM mmol/L 3.3*   CO2 mmol/L 17.0*   CREATININE mg/dL 1.57*   GLUCOSE mg/dL 123*       I have reviewed the medications.    Assessment/Problem List  POD# 3 S/p I/D, revision L TEA    Plan  PT/OT  Home with IV antibiotics, today or tomorrow  F/u with me 2 weeks.  Cover incision when showering    Juan David Sunshine MD  05/01/25  07:43 EDT            "

## 2025-05-01 NOTE — PLAN OF CARE
Problem: Adult Inpatient Plan of Care  Goal: Plan of Care Review  Outcome: Progressing  Flowsheets  Taken 5/1/2025 0423 by Holly Mueller RN  Outcome Evaluation: Pt rested all night. Awoken several times throughout the night to urinate. able to ambulate standby.   at bedside assisting with care. arm remains wrapped and in a sling.  ace bandage in place. fingers are warm and able to move independently. reported no pain at this time.  Plan of Care Reviewed With:   patient   spouse  Taken 4/30/2025 1006 by Carole Ruggiero, PT  Progress: improving  Goal: Patient-Specific Goal (Individualized)  Outcome: Progressing  Goal: Absence of Hospital-Acquired Illness or Injury  Outcome: Progressing  Intervention: Identify and Manage Fall Risk  Recent Flowsheet Documentation  Taken 5/1/2025 0400 by Holly Mueller RN  Safety Promotion/Fall Prevention:   safety round/check completed   lighting adjusted   assistive device/personal items within reach  Taken 5/1/2025 0200 by Holly Mueller RN  Safety Promotion/Fall Prevention:   safety round/check completed   lighting adjusted   assistive device/personal items within reach  Taken 5/1/2025 0000 by Holly Mueller RN  Safety Promotion/Fall Prevention:   safety round/check completed   lighting adjusted   assistive device/personal items within reach  Taken 4/30/2025 2200 by Holly Mueller RN  Safety Promotion/Fall Prevention:   safety round/check completed   lighting adjusted   assistive device/personal items within reach  Taken 4/30/2025 2010 by Holly Mueller RN  Safety Promotion/Fall Prevention: activity supervised  Intervention: Prevent Skin Injury  Recent Flowsheet Documentation  Taken 5/1/2025 0400 by Holly Mueller RN  Body Position:   turned   left  Skin Protection:   incontinence pads utilized   skin sealant/moisture barrier applied  Taken 5/1/2025 0200 by Holly Mueller RN  Body Position:   turned   right  Skin Protection:   incontinence pads  utilized   skin sealant/moisture barrier applied  Taken 5/1/2025 0000 by Holly Mueller RN  Body Position:   turned   supine  Skin Protection:   incontinence pads utilized   skin sealant/moisture barrier applied  Taken 4/30/2025 2200 by Holly Mueller RN  Body Position:   turned   left  Skin Protection:   incontinence pads utilized   skin sealant/moisture barrier applied  Taken 4/30/2025 2010 by Holly Muelelr RN  Body Position: position changed independently  Skin Protection: incontinence pads utilized  Intervention: Prevent and Manage VTE (Venous Thromboembolism) Risk  Recent Flowsheet Documentation  Taken 4/30/2025 2010 by Holly Mueller RN  VTE Prevention/Management:   bilateral   SCDs (sequential compression devices) off   patient refused intervention  Intervention: Prevent Infection  Recent Flowsheet Documentation  Taken 5/1/2025 0400 by Holly Mueller RN  Infection Prevention:   single patient room provided   rest/sleep promoted  Taken 5/1/2025 0200 by Holly Mueller RN  Infection Prevention:   single patient room provided   rest/sleep promoted  Taken 5/1/2025 0000 by Holly Mueller RN  Infection Prevention:   single patient room provided   rest/sleep promoted  Taken 4/30/2025 2200 by Holly Mueller RN  Infection Prevention:   single patient room provided   rest/sleep promoted  Taken 4/30/2025 2010 by Holly Mueller RN  Infection Prevention:   equipment surfaces disinfected   environmental surveillance performed  Goal: Optimal Comfort and Wellbeing  Outcome: Progressing  Intervention: Monitor Pain and Promote Comfort  Recent Flowsheet Documentation  Taken 4/30/2025 2010 by Holly Mueller RN  Pain Management Interventions: position adjusted  Intervention: Provide Person-Centered Care  Recent Flowsheet Documentation  Taken 4/30/2025 2010 by Holly Mueller RN  Trust Relationship/Rapport:   care explained   choices provided  Goal: Readiness for Transition of Care  Outcome:  Progressing   Goal Outcome Evaluation:  Plan of Care Reviewed With: patient, spouse           Outcome Evaluation: Pt rested all night. Awoken several times throughout the night to urinate. able to ambulate standby.   at bedside assisting with care. arm remains wrapped and in a sling.  ace bandage in place. fingers are warm and able to move independently. reported no pain at this time.

## 2025-05-01 NOTE — PROGRESS NOTES
[facility]    Acute pain service Inpatient Progress Note    Patient Name: Britney Raman  :  1960  MRN:  [id number]        Acute Pain  Service Inpatient Progress Note:    Analgesia:Good  Pain Score:3/10  LOC: alert and awake  Resp Status: room air  Cardiac: VS stable  Side Effects:None  Catheter Site:clean, dressing intact and dry  Cath type: peripheral nerve cath(InfuSystem)  Volume: 1mL,5ml, 5ml InfuSystem Pump.  Catheter Plan:Catheter to remain Insitu and Continue catheter infusion rate unchanged  Comments: The neuro assessment of the operative extremity does not include the ability to do finger flexion and extension; does not include the ability to do wrist flexion and extension, does not include the ability to do elbow flexion and extension.  The neuro exam of the patient includes sensory function throughout the operative extremity.

## 2025-05-01 NOTE — PROGRESS NOTES
INFECTIOUS DISEASE Progress Note    Britney Raman  1960  [id number]      Admission Date: 4/28/2025      Requesting Provider: Juan David Sunshine MD  Evaluating Physician: Tavon Valentin MD    Reason for Consultation:  Left total elbow arthroplasty infection    History of present illness:    4/28/25: Patient is a 64 y.o. female  with metastatic breast cancer who is seen today for evaluation of a left total elbow arthroplasty infection.  She has a history of bilateral breast cancer with metastatic disease to her liver, spine, and lung.  She developed a pathologic distal humerus fracture and underwent left total elbow arthroplasty on 5/25/2022.   Approximately 2-3 months ago she noted the onset of slowly increasing left elbow pain.  2 weeks ago she noted the onset of left elbow swelling with worsening erythema.   CT scan revealed significant elbow fluid.  She has not been on antibiotic therapy and did  not undergo elbow aspiration.   She is scheduled for left elbow debridement with poly exchange today.  She denies fever, chills, sweats.  She has a history of prior MRSA axillary infection.  She states that she is allergic to clindamycin and Bactrim after developing a rash while on both of these medications.  She is uncertain  which antibiotic caused the rash.    she has received perioperative vancomycin and cefazolin prophylaxis.  I saw her in the preoperative area.  At the time of her surgery she was found to have purulent material with necrotic tissue.    Laboratory studies from 4/18 revealed a C-reactive protein of 14.5, sedimentation of 74, and white blood cell count of 4.5.     4/29/25:    She has remained afebrile.  Left elbow fluid Gram stain revealed few white blood cells and few gram-positive cocci in pairs and clusters.   Left elbow cultures are no growth so far.   She has a right-sided Port-A-Cath in place.   Her creatinine today is 1.85 with an  EGFR of 30.    4/30/25:   She remains afebrile.  Left elbow operative cultures from 4/28 are no growth so far.   Denies uncontrolled abdominal pain.  She denies nausea and vomiting.  She states that she would never undergo hemodialysis under any circumstances.     5/1/25:  She underwent successful right arm PICC line  placement yesterday.   Left elbow cultures are no growth so far.   White blood cell count yesterday was 11.1.  Creatinine was 1.57.   She denies increased elbow pain.       Past Medical History:   Diagnosis Date    Arthritis     Breast cancer 2018    Left - had mastectomy and subsequent chemo and radiation - new diagnosis (2022) of right breast CA and mets to spine    Cataract     MILD    GERD (gastroesophageal reflux disease)     Hx MRSA infection 2018    right arm x 2 and right leg; most recent treatment 2018    Knee pain     LEFT MAINLY    PONV (postoperative nausea and vomiting)     AFTER BREAST SURGERY- HAD WATER AND THREW IT UP IMMEDIATELY    Urinary incontinence in female     UTI (urinary tract infection)     Wears glasses        Past Surgical History:   Procedure Laterality Date    BREAST BIOPSY Bilateral     DIAGNOSTIC LAPAROSCOPY      ENDOSCOPY      EYE SURGERY Bilateral     AS CHILD    INJECTION OF MEDICATION      X7 LEFT KNEE    MASTECTOMY Left 2018    PORTACATH PLACEMENT      TOTAL ELBOW ARTHROPLASTY Left 5/25/2022    Procedure: TOTAL ELBOW ARTHROPLASTY FOR FRACTURE,  ULNAR NERVE TRANSPOSITION;  Surgeon: Juan David Sunshine MD;  Location: [facility] OR;  Service: Orthopedics;  Laterality: Left;    TOTAL ELBOW ARTHROPLASTY Left 4/28/2025    Procedure: TOTAL ELBOW REVISION, IRRIGATION AND DEBRIDEMENT LEFT;  Surgeon: Juan David Sunshine MD;  Location:  [facility] OR;  Service: Orthopedics;  Laterality: Left;    TUBAL ABDOMINAL LIGATION         Family History   Problem Relation Age of Onset    Heart disease Mother     Heart disease Father        Social History     Socioeconomic History    Marital status:    Tobacco Use    Smoking status:  Former     Current packs/day: 0.50     Average packs/day: 0.5 packs/day for 5.0 years (2.5 ttl pk-yrs)     Types: Cigarettes    Smokeless tobacco: Never    Tobacco comments:     QUIT AGE 19   Vaping Use    Vaping status: Never Used   Substance and Sexual Activity    Alcohol use: Never    Drug use: Never       Allergies   Allergen Reactions    Clindamycin GI Intolerance    Bactrim Ds [Sulfamethoxazole-Trimethoprim] Rash         Medication:    Current Facility-Administered Medications:     cefTRIAXone (ROCEPHIN) 2,000 mg in sodium chloride 0.9 % 100 mL MBP, 2,000 mg, Intravenous, Q24H, Tavon Valentin MD, Last Rate: 200 mL/hr at 04/30/25 2248, 2,000 mg at 04/30/25 2248    citalopram (CeleXA) tablet 40 mg, 40 mg, Oral, Daily, Freeman Perales MD, 40 mg at 04/30/25 0914    everolimus (AFINITOR) tablet 5 mg **Patient supplied med**, 5 mg, Oral, Daily, Freeman Perales MD, 5 mg at 04/30/25 0914    exemestane (AROMASIN) tablet 25 mg **Patient Supplied Med**, 25 mg, Oral, Daily, Freeman Perales MD, 25 mg at 04/30/25 0915    HYDROmorphone (DILAUDID) injection 0.5 mg, 0.5 mg, Intravenous, Q2H PRN **AND** naloxone (NARCAN) injection 0.1 mg, 0.1 mg, Intravenous, Q5 Min PRN, Juan David Sunshine MD    labetalol (NORMODYNE,TRANDATE) injection 10 mg, 10 mg, Intravenous, Q4H PRN, Freeman Perales MD    metoprolol succinate XL (TOPROL-XL) 24 hr tablet 100 mg, 100 mg, Oral, Daily, Freeman Perales MD, 100 mg at 04/30/25 0914    Potassium Replacement - Follow Nurse / BPA Driven Protocol, , Not Applicable, PRN, Shae Whitt APRN    ropivacaine (NAROPIN) 0.2 % infusion (INFUSYSTEM), , Peripheral Nerve, Continuous, Black Villarreal CRNA, 1,000 mg at 04/28/25 1557    sodium bicarbonate tablet 1,300 mg, 1,300 mg, Oral, BID, Freeman Perales MD, 1,300 mg at 04/30/25 2143    sodium chloride 0.9 % bolus 500 mL, 500 mL, Intravenous, TID PRN, Freeman Perales MD    sodium chloride 0.9 % flush 10 mL, 10 mL,  Intravenous, Q12H, Juan David Sunshine MD, 10 mL at 25 2145    sodium chloride 0.9 % flush 10 mL, 10 mL, Intravenous, PRN, Juan David Sunshine MD    sodium chloride 0.9 % flush 10 mL, 10 mL, Intravenous, Q12H, Tavon Valentin MD, 10 mL at 25 2145    sodium chloride 0.9 % flush 10 mL, 10 mL, Intravenous, PRN, Tavon Valentin MD    sodium chloride 0.9 % flush 20 mL, 20 mL, Intravenous, PRN, Tavon Valentin MD    sodium chloride 0.9 % infusion 40 mL, 40 mL, Intravenous, PRN, Juan David Sunshine MD    sodium chloride 0.9 % infusion 40 mL, 40 mL, Intravenous, PRNBarbie Mark J, MD    sodium chloride 0.9 % infusion, 100 mL/hr, Intravenous, Continuous, Freeman Perales MD, Stopped at 25 0924    Antibiotics:  Anti-Infectives (From admission, onward)      Ordered     Dose/Rate Route Frequency Start Stop    25 2152  DAPTOmycin (CUBICIN) 500 mg in sodium chloride 0.9 % 50 mL IVPB  Status:  Discontinued        Ordering Provider: Tavon Valentin MD    8 mg/kg × 63.5 kg  100 mL/hr over 30 Minutes Intravenous Every 24 Hours 25 2300 25 1807    25 2152  cefTRIAXone (ROCEPHIN) 2,000 mg in sodium chloride 0.9 % 100 mL MBP        Ordering Provider: Tavon Valentin MD    2,000 mg  200 mL/hr over 30 Minutes Intravenous Every 24 Hours 25 2300 25 2259    25 1146  vancomycin (VANCOCIN) 1,000 mg in sodium chloride 0.9 % 250 mL IVPB-VTB        Ordering Provider: Juan David Sunshine MD    15 mg/kg × 63.5 kg  250 mL/hr over 60 Minutes Intravenous Once 25 1148 25 1347    25 1123  ceFAZolin 2000 mg IVPB in 100 mL NS (MBP)        Ordering Provider: Juan David Sunshine MD    2,000 mg  over 30 Minutes Intravenous Once 25 1125 25 1311              Review of Systems:  See HPI      Physical Exam:   Vital Signs  Temp (24hrs), Av.1 °F (36.7 °C), Min:97.1 °F (36.2 °C), Max:98.8 °F (37.1 °C)    Temp  Min: 97.1 °F (36.2 °C)  Max:  "98.8 °F (37.1 °C)  BP  Min: 135/73  Max: 158/74  Pulse  Min: 77  Max: 85  Resp  Min: 16  Max: 16  SpO2  Min: 98 %  Max: 100 %    GENERAL: Awake and alert, in no acute distress.   HEENT: Normocephalic, atraumatic.  PERRL. EOMI. No conjunctival injection. No icterus. Oropharynx clear without evidence of thrush or exudate.  NECK: Supple   HEART: RRR; No murmur, rubs, gallops.   LUNGS: Clear to auscultation bilaterally without wheezing, rales, rhonchi. Normal respiratory effort. Nonlabored.  ABDOMEN: Soft, nontender, nondistended. No rebound or guarding. NO mass or HSM.  EXT: Left elbow is in a postoperative dressing  :  Without Amaya catheter.  MSK: No joint effusions or erythema  SKIN: Warm and dry without cutaneous eruptions on Inspection/palpation.    NEURO: Oriented to PPT.  Motor 5/5 strength  PSYCHIATRIC: Normal insight and judgment. Cooperative with PE    Laboratory Data    Results from last 7 days   Lab Units 04/30/25  1231   WBC 10*3/mm3 11.13*   HEMOGLOBIN g/dL 8.7*   HEMATOCRIT % 31.4*   PLATELETS 10*3/mm3 247     Results from last 7 days   Lab Units 04/30/25  1230   SODIUM mmol/L 133*   POTASSIUM mmol/L 3.3*   CHLORIDE mmol/L 102   CO2 mmol/L 17.0*   BUN mg/dL 13   CREATININE mg/dL 1.57*   GLUCOSE mg/dL 123*   CALCIUM mg/dL 7.8*     Results from last 7 days   Lab Units 04/29/25  0852   ALK PHOS U/L 60   BILIRUBIN mg/dL 0.2   ALT (SGPT) U/L 8   AST (SGOT) U/L 18         Results from last 7 days   Lab Units 04/29/25  0852   CRP mg/dL 5.28*         Results from last 7 days   Lab Units 04/29/25  0852   CK TOTAL U/L 76         Estimated Creatinine Clearance: 36.8 mL/min (A) (by C-G formula based on SCr of 1.57 mg/dL (H)).      Microbiology:  No results found for: \"ACANTHNAEG\", \"AFBCX\", \"BPERTUSSISCX\", \"BLOODCX\"  No results found for: \"BCIDPCR\", \"CXREFLEX\", \"CSFCX\", \"CULTURETIS\"  No results found for: \"CULTURES\", \"HSVCX\", \"URCX\"  No results found for: \"EYECULTURE\", \"GCCX\", \"HSVCULTURE\", \"LABHSV\"  No results " "found for: \"LEGIONELLA\", \"MRSACX\", \"MUMPSCX\", \"MYCOPLASCX\"  No results found for: \"NOCARDIACX\", \"STOOLCX\"  No results found for: \"THROATCX\", \"UNSTIMCULT\", \"URINECX\", \"CULTURE\", \"VZVCULTUR\"  No results found for: \"VIRALCULTU\", \"WOUNDCX\"        Radiology:  Imaging Results (Last 72 Hours)       Procedure Component Value Units Date/Time    XR Elbow 3+ View Left [[id number]] Collected: 04/28/25 2225     Updated: 04/28/25 2232    Narrative:      XR ELBOW 3+ VW LEFT    Date of Exam: 4/28/2025 5:12 PM EDT    Indication: postop revision TEA    Comparison: Left elbow series 5/25/2022    Findings:  Joint prosthesis remains in place.    Soft tissue air or gas is evident. Air or gas is seen in the elbow joint.    Lucency and sclerosis is seen in the distal humeral metaphysis, and in the proximal ulna.    No fractures are visualized.      Impression:      Impression:  AP and lateral views of the left elbow, as above.      Electronically Signed: Romel Nickerson MD    4/28/2025 10:29 PM EDT    Workstation ID: [id number]    IMAGING SCANNED [[id number]] Resulted: 04/28/25     Updated: 04/28/25 1256    IMAGING SCANNED [[id number]] Resulted: 04/28/25     Updated: 04/28/25 1242              Impression:   1.  Left total elbow arthroplasty infection- her operative cultures are no growth so far.  The fact that her operative cultures are negative at 3 days increases the likelihood that your infection is secondary to a fast hideous organism such as cutibacterium acnes.  I will plan for her to discharge on outpatient intravenous Rocephin.  2.  Metastatic breast cancer  3.  History of Bactrim/clindamycin allergies  4.   History of MRSA axillary infection-remote  5.   Chronic kidney disease-versus acute kidney injury.  Her creatinine was down to 1.57 on 4/30.    PLAN/RECOMMENDATIONS:   --  S/P Left total elbow debridement 4/28  --  Left elbow cultures-NGSF  --  Intravenous Rocephin  --  Discharge to home today  -- Appt. to see me Tuesday 5/6 at " 1200-this appointment has been made      She can discharge today on outpatient intravenous Rocephin pending further culture data.  I discussed her disposition with the pharmacist at [facility] home infusion.  The fact that she lives in [place]  somewhat complicated supervision of her outpatient intravenous antibiotic therapy.  I have now been informed that she will stay with her sister at least briefly in [place] and this will facilitate [facility] home infusion providing the intravenous Rocephin.  I discussed her disposition with case management repeatedly today.  [facility] will plan to infuse her in the office on the days of her office visits and change her PICC line dressing weekly.   We will not need to have home health.   I communicated with Dr. Perales  Regarding her disposition today   Her high complexity medical problems required high complexity medical decision making today.    This visit included the following complex service elements:  Complex medical decision-making associated with antimicrobial prescribing.  In-depth chart review with high level synthesis for complex diagnoses.  Managed infection treatment protocol associated with transitions of care for this complex patient.    Outpatient orders:   1.  Outpatient intravenous antibiotic therapy: Rocephin 2 g IV daily to be provided by [facility] home infusion  2.  PICC line dressing changes will be performed weekly in our office.  3.  CBC, CMP, ESR, CRP at the time of the visit with me next week  4.  Appt. to see me Tuesday 5/6 at 1200-this appointment has been made     Tavon Valentin MD  5/1/2025  07:23 EDT

## 2025-05-01 NOTE — PLAN OF CARE
Goal Outcome Evaluation:  Plan of Care Reviewed With: patient, spouse        Progress: improving  Outcome Evaluation: Pt improved ambulation distance and mechanics this date with no overt LOB noted. Pt would benefit from continued IP PT while admitted to continue progressing pt toward baseline. Recommend home with 24/7 assist and HHPT.    Anticipated Discharge Disposition (PT): home with 24/7 care, home with home health

## 2025-05-01 NOTE — THERAPY TREATMENT NOTE
Patient Name: Britney Raman  : 1960    MRN: [id number]                              Today's Date: 2025       Admit Date: 2025    Visit Dx:     ICD-10-CM ICD-9-CM   1. Infection of prosthetic shoulder joint, initial encounter  T84.59XA 996.66    Z96.619 V43.61   2. 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, initial encounter    Infection of prosthetic shoulder joint     Past Medical History:   Diagnosis Date    Arthritis     Breast cancer 2018    Left - had mastectomy and subsequent chemo and radiation - new diagnosis () of right breast CA and mets to spine    Cataract     MILD    GERD (gastroesophageal reflux disease)     Hx MRSA infection 2018    right arm x 2 and right leg; most recent treatment 2018    Knee pain     LEFT MAINLY    PONV (postoperative nausea and vomiting)     AFTER BREAST SURGERY- HAD WATER AND THREW IT UP IMMEDIATELY    Urinary incontinence in female     UTI (urinary tract infection)     Wears glasses      Past Surgical History:   Procedure Laterality Date    BREAST BIOPSY Bilateral     DIAGNOSTIC LAPAROSCOPY      ENDOSCOPY      EYE SURGERY Bilateral     AS CHILD    INJECTION OF MEDICATION      X7 LEFT KNEE    MASTECTOMY Left 2018    PORTACATH PLACEMENT      TOTAL ELBOW ARTHROPLASTY Left 2022    Procedure: TOTAL ELBOW ARTHROPLASTY FOR FRACTURE,  ULNAR NERVE TRANSPOSITION;  Surgeon: Juan David Sunshine MD;  Location: [facility] OR;  Service: Orthopedics;  Laterality: Left;    TOTAL ELBOW ARTHROPLASTY Left 2025    Procedure: TOTAL ELBOW REVISION, IRRIGATION AND DEBRIDEMENT LEFT;  Surgeon: Juan David Sunshine MD;  Location: [facility] OR;  Service: Orthopedics;   Laterality: Left;    TUBAL ABDOMINAL LIGATION        General Information       Row Name 05/01/25 1121          Physical Therapy Time and Intention    Document Type therapy note (daily note)  -ND     Mode of Treatment physical therapy  -ND       Row Name 05/01/25 1121          General Information    Patient Profile Reviewed yes  -ND     Existing Precautions/Restrictions fall;left;non-weight bearing;other (see comments)  LUE NWB + sling. Nerve cath  -ND     Barriers to Rehab medically complex;previous functional deficit  -ND       Row Name 05/01/25 1121          Cognition    Orientation Status (Cognition) oriented x 4  -ND       Row Name 05/01/25 1121          Safety Issues/Impairments Affecting Functional Mobility    Safety Issues Affecting Function (Mobility) awareness of need for assistance  -ND     Impairments Affecting Function (Mobility) balance;range of motion (ROM)  -ND               User Key  (r) = Recorded By, (t) = Taken By, (c) = Cosigned By      Initials Name Provider Type    ND Gema Gomez, PT Physical Therapist                   Mobility       Row Name 05/01/25 1125          Bed Mobility    Bed Mobility supine-sit  -ND     Supine-Sit [place] (Bed Mobility) modified independence  -ND     Assistive Device (Bed Mobility) bed rails;head of bed elevated  -ND     Comment, (Bed Mobility) Performs without assist while maintaining NWB precautions appropriately. Denies dizziness with position change.  -ND       Row Name 05/01/25 1125          Transfers    Comment, (Transfers) Declines need for SPC.  -ND       Row Name 05/01/25 1125          Sit-Stand Transfer    Sit-Stand [place] (Transfers) contact guard;verbal cues  -ND     Comment, (Sit-Stand Transfer) from EOB, from toilet  -ND       Row Name 05/01/25 1125          Gait/Stairs (Locomotion)    [place] Level (Gait) contact guard;verbal cues;nonverbal cues (demo/gesture)  -ND     Patient was able to Ambulate yes  -ND     Distance in Feet  (Gait) 300  -ND     Deviations/Abnormal Patterns (Gait) bilateral deviations;jenifer decreased;gait speed decreased;stride length decreased  -ND     Bilateral Gait Deviations forward flexed posture;heel strike decreased  -ND     Comment, (Gait/Stairs) Pt ambulates with quick step-through gait pattern and no AD this date. Pt demo's decreased R arm swing but is able to maintain balance adequately with activity. No overt knee buckling or LOB noted.  -ND       Row Name 05/01/25 1125          Mobility    Extremity Weight-bearing Status left upper extremity  -ND     Left Upper Extremity (Weight-bearing Status) non weight-bearing (NWB)  -ND               User Key  (r) = Recorded By, (t) = Taken By, (c) = Cosigned By      Initials Name Provider Type    Gema Cordero PT Physical Therapist                   Obj/Interventions       Row Name 05/01/25 1129          Balance    Balance Assessment sitting static balance;sitting dynamic balance;standing static balance;standing dynamic balance  -ND     Static Sitting Balance independent  -ND     Dynamic Sitting Balance independent  -ND     Position, Sitting Balance unsupported;sitting edge of bed  -ND     Static Standing Balance standby assist  -ND     Dynamic Standing Balance contact guard  -ND     Position/Device Used, Standing Balance unsupported  -ND     Balance Interventions sitting;standing;sit to stand;supported;dynamic;static  -ND               User Key  (r) = Recorded By, (t) = Taken By, (c) = Cosigned By      Initials Name Provider Type    Gema Cordero PT Physical Therapist                   Goals/Plan    No documentation.                  Clinical Impression       Row Name 05/01/25 1129          Pain    Pretreatment Pain Rating 0/10 - no pain  -ND     Posttreatment Pain Rating 0/10 - no pain  -ND       Row Name 05/01/25 1129          Plan of Care Review    Plan of Care Reviewed With patient;spouse  -ND     Progress improving  -ND     Outcome Evaluation  Pt improved ambulation distance and mechanics this date with no overt LOB noted. Pt would benefit from continued IP PT while admitted to continue progressing pt toward baseline. Recommend home with 24/7 assist and HHPT.  -ND       Row Name 05/01/25 1129          Vital Signs    Pre Systolic BP Rehab 137  -ND     Pre Treatment Diastolic BP 69  -ND     Post Systolic BP Rehab 169  -ND     Post Treatment Diastolic BP 78  -ND     Pretreatment Heart Rate (beats/min) 87  -ND     Posttreatment Heart Rate (beats/min) 74  -ND     Pre SpO2 (%) 98  -ND     O2 Delivery Pre Treatment room air  -ND     O2 Delivery Intra Treatment room air  -ND     Post SpO2 (%) 95  -ND     O2 Delivery Post Treatment room air  -ND     Pre Patient Position Supine  -ND     Intra Patient Position Standing  -ND     Post Patient Position Supine  -ND       Row Name 05/01/25 1129          Positioning and Restraints    Pre-Treatment Position in bed  -ND     Post Treatment Position bed  -ND     In Bed notified nsg;supine;call light within reach;encouraged to call for assist;exit alarm on  -ND               User Key  (r) = Recorded By, (t) = Taken By, (c) = Cosigned By      Initials Name Provider Type    ND Gema Gomez, PT Physical Therapist                   Outcome Measures       Row Name 05/01/25 1132          How much help from another person do you currently need...    Turning from your back to your side while in flat bed without using bedrails? 3  -ND     Moving from lying on back to sitting on the side of a flat bed without bedrails? 3  -ND     Moving to and from a bed to a chair (including a wheelchair)? 3  -ND     Standing up from a chair using your arms (e.g., wheelchair, bedside chair)? 3  -ND     Climbing 3-5 steps with a railing? 3  -ND     To walk in hospital room? 3  -ND     AM-PAC 6 Clicks Score (PT) 18  -ND     Highest Level of Mobility Goal 6 --> Walk 10 steps or more  -ND       Row Name 05/01/25 1132          Functional Assessment     Outcome Measure Options AM-PAC 6 Clicks Basic Mobility (PT)  -ND               User Key  (r) = Recorded By, (t) = Taken By, (c) = Cosigned By      Initials Name Provider Type    ND Gema Gomez PT Physical Therapist                                 Physical Therapy Education       Title: PT OT SLP Therapies (Done)       Topic: Physical Therapy (Done)       Point: Mobility training (Done)       Learning Progress Summary            Patient Acceptance, E, VU by ND at 5/1/2025 1133    Acceptance, E, VU,NR by  at 4/30/2025 1011    Acceptance, E, VU,NR by  at 4/29/2025 0944    Acceptance, E, VU by BC at 4/29/2025 0810   Significant Other Acceptance, E, VU,NR by  at 4/29/2025 0944                      Point: Home exercise program (Done)       Learning Progress Summary            Patient Acceptance, E, VU by BC at 4/29/2025 0810                      Point: Body mechanics (Done)       Learning Progress Summary            Patient Acceptance, E, VU by ND at 5/1/2025 1133    Acceptance, E, VU,NR by  at 4/30/2025 1011    Acceptance, E, VU,NR by  at 4/29/2025 0944    Acceptance, E, VU by BC at 4/29/2025 0810   Significant Other Acceptance, E, VU,NR by  at 4/29/2025 0944                      Point: Precautions (Done)       Learning Progress Summary            Patient Acceptance, E, VU by ND at 5/1/2025 1133    Acceptance, E, VU,NR by  at 4/30/2025 1011    Acceptance, E, VU,NR by  at 4/29/2025 0944    Acceptance, E, VU by BC at 4/29/2025 0810   Significant Other Acceptance, E, VU,NR by  at 4/29/2025 0944                                      User Key       Initials Effective Dates Name Provider Type Discipline    BC 04/27/21 -  Lucy Frazier, RN Registered Nurse Nurse     09/21/23 -  Carole Ruggiero, PT Physical Therapist PT    ND 11/16/23 -  Gema Gomez PT Physical Therapist PT                  PT Recommendation and Plan     Progress: improving  Outcome Evaluation: Pt improved ambulation distance  and mechanics this date with no overt LOB noted. Pt would benefit from continued IP PT while admitted to continue progressing pt toward baseline. Recommend home with 24/7 assist and HHPT.     Time Calculation:         PT Charges       Row Name 05/01/25 1133             Time Calculation    Start Time 1101  -ND      PT Received On 05/01/25  -ND         Timed Charges    77694 - PT Therapeutic Activity Minutes 15  -ND         Total Minutes    Timed Charges Total Minutes 15  -ND       Total Minutes 15  -ND                User Key  (r) = Recorded By, (t) = Taken By, (c) = Cosigned By      Initials Name Provider Type    ND Gema Gomez, PT Physical Therapist                  Therapy Charges for Today       Code Description Service Date Service Provider Modifiers Qty    71902389086 HC PT THERAPEUTIC ACT EA 15 MIN 5/1/2025 Gema Gomez, PT GP 1            PT G-Codes  Outcome Measure Options: AM-PAC 6 Clicks Basic Mobility (PT)  AM-PAC 6 Clicks Score (PT): 18  AM-PAC 6 Clicks Score (OT): 14  PT Discharge Summary  Anticipated Discharge Disposition (PT): home with 24/7 care, home with home health    Gema Gomez, PT  5/1/2025

## 2025-05-01 NOTE — DISCHARGE SUMMARY
Patient Name: Britney Raman  MRN: [id number]  : 1960  DOS: 5/3/2025    Attending: No att. providers found    Primary Care Provider: Fatou Vera MD    Date of Admission:.2025 10:13 AM    Date of Discharge:  5/3/2025    Discharge Diagnosis:   Revision left total elbow arthroplasty.     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   Status post PICC placement      Consults:   ID, Tavon Valentin MD     Hospital Course    At admit:  Patient is a pleasant 64 y.o. female presented for scheduled procedure by .     Patient underwent left total elbow replacement 3 years ago along with ulnar nerve transposition.  She has done well postoperatively with no complications early on.     She presented in April of this year with progressive discomfort in her left elbow and occasional episodes of this getting warm and hot.  She had hospitalizations at her local hospital.     Per 's note: ( This is a 64-year-old female who underwent a left total elbow arthroplasty for pathologic distal humerus fracture due to metastatic breast cancer. She was doing well until a few weeks ago when she began developing swelling and erythema about her elbow. She presented to the office with inflammatory findings and concern for septic arthritis. CT scan showed significant fluid. Risks and benefits of surgery as well as alternatives were discussed with plan for washout and bushing exchange with antibiotic suppression given her very well-fixed implants and comorbidities. She understood and wished to proceed.  )     Her workup was consistent with prosthetic joint infection and today she underwent revision left total elbow arthroplasty.     Surgery was done under general anesthesia on a block, was tolerated well, she is  admitted for further management.     When seen in PACU, she is dealing with some postoperative pain.  She is drowsy due to pain medication but answers questions fairly well    After admit:  Patient was provided pain medications as needed for pain control, along with interscalene nerve block infusion of Ropivacaine    Adjustments were made to pain medications to optimize postop pain management.     Risks and benefits of opiate medications discussed with patient. THANH report on chart was reviewed.    During her stay patient received IV antibiotics under the direction of Tavon Valentin MD with [facility].  She will require 6 weeks of IV antibiotics and a PICC line was placed in the right upper extremity after extensive discussion between the patient and Dr. Valentin regarding risks and benefits.    The patient was seen by OT and was taught exercises for left arm.  The patient used an IS for atelectasis prophylaxis and mechanicals for DVT prophylaxis.  Following discharge and with patient having a PICC line in place I prescribed aspirin 81 mg twice daily for 6 weeks which is the duration of antibiotics and PICC line presence.    Home medications were otherwise resumed as appropriate, and labs were monitored and noted creatinine has improved during her stay with a creatinine earlier this month being around 2.4 has improved during her stay to 1.57.  Low potassium was replaced.    With the progress she has made, patient is ready for DC home today.      The patient will have an Infupump ( instructed on it during this admit)  Discussed with patient regarding plan and she shows understanding and agreement.      IV antibiotics were arranged by case management and ID to be continued at discharge.      Procedures Performed  Procedure(s):  TOTAL ELBOW REVISION, IRRIGATION AND DEBRIDEMENT LEFT       Pertinent Test Results:    I reviewed the patient's new clinical results.   Results from last 7  days   Lab Units 04/30/25  1231   WBC 10*3/mm3 11.13*   HEMOGLOBIN g/dL 8.7*   HEMATOCRIT % 31.4*   PLATELETS 10*3/mm3 247     Results from last 7 days   Lab Units 04/30/25  1230 04/29/25  0852   SODIUM mmol/L 133* 132*   POTASSIUM mmol/L 3.3* 3.6   CHLORIDE mmol/L 102 98   CO2 mmol/L 17.0* 16.0*   BUN mg/dL 13 17   CREATININE mg/dL 1.57* 1.85*   CALCIUM mg/dL 7.8* 8.2*   BILIRUBIN mg/dL  --  0.2   ALK PHOS U/L  --  60   ALT (SGPT) U/L  --  8   AST (SGOT) U/L  --  18   GLUCOSE mg/dL 123* 160*     I reviewed the patient's new imaging including images and reports.  Collected Updated Procedure Result Status    04/28/2025 1419 04/28/2025 2049 Fungus Culture - Tissue, Elbow, Left [[id number]]   Tissue from Elbow, Left    In process Component Value   No component results             04/28/2025 1419 05/01/2025 0716 Tissue / Bone Culture - Tissue, Elbow, Left [[id number]]   Tissue from Elbow, Left    Final result Component Value   Tissue Culture No growth at 3 days   Gram Stain Few (2+) WBCs per low power field    Few (2+) Gram positive cocci in pairs and clusters             04/28/2025 1419 04/29/2025 1200 AFB Culture - Tissue, Elbow, Left [[id number]]   Tissue from Elbow, Left    Preliminary result Component Value   AFB Stain No acid fast bacilli seen on concentrated smear P             04/28/2025 1419 05/03/2025 0732 Anaerobic Culture 10 Day Incubation - Tissue, Elbow, Left [[id number]]   Tissue from Elbow, Left    Preliminary result Component Value   Anaerobic Culture No anaerobes isolated at 5 days P             04/28/2025 1403 04/28/2025 2049 Fungus Culture - Tissue, Elbow, Left [[id number]]   Tissue from Elbow, Left    In process Component Value   No component results             04/28/2025 1403 05/01/2025 0716 Tissue / Bone Culture - Tissue, Elbow, Left [[id number]]   Tissue from Elbow, Left    Final result Component Value   Tissue Culture No growth at 3 days   Gram Stain Many (4+) WBCs per low power field    No organisms  seen             04/28/2025 1403 04/29/2025 1200 AFB Culture - Tissue, Elbow, Left [[id number]]   Tissue from Elbow, Left    Preliminary result Component Value   AFB Stain No acid fast bacilli seen on concentrated smear P             04/28/2025 1403 05/03/2025 0724 Anaerobic Culture 10 Day Incubation - Tissue, Elbow, Left [[id number]]   Tissue from Elbow, Left    Preliminary result Component Value   Anaerobic Culture No anaerobes isolated at 5 days P             04/28/2025 1400 04/28/2025 2049 Fungus Culture - Tissue, Elbow, Left [[id number]]   Tissue from Elbow, Left    In process Component Value   No component results             04/28/2025 1400 05/01/2025 0716 Tissue / Bone Culture - Tissue, Elbow, Left [[id number]]   Tissue from Elbow, Left    Final result Component Value   Tissue Culture No growth at 3 days   Gram Stain Moderate (3+) WBCs per low power field    No organisms seen             04/28/2025 1400 04/29/2025 1200 AFB Culture - Tissue, Elbow, Left [[id number]]   Tissue from Elbow, Left    Preliminary result Component Value   AFB Stain No acid fast bacilli seen on concentrated smear P             04/28/2025 1400 05/03/2025 0724 Anaerobic Culture 10 Day Incubation - Tissue, Elbow, Left [[id number]]   Tissue from Elbow, Left    Preliminary result Component Value   Anaerobic Culture No anaerobes isolated at 5 days P             04/18/2025 1308 04/23/2025 1330 Blood Culture - Blood, Arm, Right [[id number]]   Blood from Arm, Right    Final result Component Value   Blood Culture No growth at 5 days                Occupational Therapy  Signed         Goal Outcome Evaluation:  Plan of Care Reviewed With: patient, spouse  Outcome Evaluation: OT educated pt and spouse on ADL retraining for comfort and HEP. She tolerated gentle AAROM LUE and declined out of bed activity d/t fatigue. Family will provide 24/7 assist, anticipate DC home with family assist.     Anticipated Discharge Disposition (OT): home with 24/7 care        "      Physical therapy  Signed         Goal Outcome Evaluation:  Plan of Care Reviewed With: patient, spouse  Progress: improving  Outcome Evaluation: Pt improved ambulation distance and mechanics this date with no overt LOB noted. Pt would benefit from continued IP PT while admitted to continue progressing pt toward baseline. Recommend home with 24/7 assist and HHPT.     Anticipated Discharge Disposition (PT): home with 24/7 care, home with home health                   Discharge Assessment:       Visit Vitals  /58 (BP Location: Right leg, Patient Position: Lying)   Pulse 88   Temp 98 °F (36.7 °C) (Oral)   Resp 16   Ht 170.2 cm (67\")   Wt 63.5 kg (140 lb)   SpO2 97%   BMI 21.93 kg/m²     No data recorded.      General Appearance:    Alert, cooperative, in no acute distress   Lungs:     Clear to auscultation,respirations regular, even and   unlabored    Heart:    Regular rhythm and normal rate, normal S1 and S2    Abdomen:     Normal bowel sounds, no masses, no organomegaly, soft        nontender, nondistended, no guarding, no rebound                 tenderness   Extremities:   LUE in a sling, CDI  dressing over left elbow  . Interscalene nerve block cath present.  Distal pulses, cap refill,  intact. Decreased Movement and sensation      Pulses:   Pulses palpable and equal bilaterally   Skin:   No bleeding, bruising or rash        Discharge Disposition:Home.           Discharge Medications        New Medications        Instructions Start Date   aspirin 81 MG EC tablet   81 mg, Oral, Every 12 Hours      cefTRIAXone 2,000 mg in sodium chloride 0.9 % 100 mL IVPB   2,000 mg, Intravenous, Every 24 Hours      ropivacaine 0.2 % infusion (INFUSYSTEM)  Commonly known as: NAROPIN   2 mL/hr (4 mg/hr), Peripheral Nerve, Continuous             Changes to Medications        Instructions Start Date   oxyCODONE 5 MG immediate release tablet  Commonly known as: ROXICODONE  What changed: You were already taking a medication " with the same name, and this prescription was added. Make sure you understand how and when to take each.   5 mg, Oral, Every 4 Hours PRN      oxyCODONE HCl 10 MG tablet   What changed: Another medication with the same name was added. Make sure you understand how and when to take each.   Oral             Continue These Medications        Instructions Start Date   citalopram 40 MG tablet  Commonly known as: CeleXA   40 mg, Daily      everolimus 5 MG tablet  Commonly known as: AFINITOR   5 mg, Daily      exemestane 25 MG tablet  Commonly known as: AROMASIN   25 mg, Daily      metoprolol succinate  MG 24 hr tablet  Commonly known as: TOPROL-XL   100 mg, Daily      TYLENOL ARTHRITIS PAIN PO   As Needed           ASA 81 mg bid for 6 weeks ( e. Prescribed to Vivienne D/w pt's , he will )    Discharge Diet: Resume prior    Activity at Discharge:   NWB left upper extremity  Follow-up Appointments:  Tavon Valentin MD per his orders,  Juan David Sunshine MD per his orders.     Discharge took over 30 min       Freeman Perales MD  05/03/25  07:29 EDT

## 2025-05-01 NOTE — DISCHARGE INSTRUCTIONS
[facility] COLD THERAPY - PATIENT INSTRUCTION SHEET    Cold Compression Therapy for your comfort and rehabilitation  Your caregivers want you to be productive in your rehab and comfortable during your stay. In keeping with those goals, you will be receiving an SMI Cold Therapy Wrap to help ease post-operative pain and swelling that might keep you from getting back on track! Your SMI Cold Therapy Wrap is effective and simple-to-use, and you will be encouraged to apply it throughout your hospital stay and at home through the duration of your recovery.    When you are ready to go home  Be sure to take your SMI Cold Therapy Wrap and both sets of Gel Bags with you for continued comfort and use throughout your rehabilitation. If you don't already have them, ask your nurse or aide to retrieve your SMI Gel Bags from the patient freezer.    Home use precautions  Always follow your medical professional's application instructions upon discharge. Your SMI Cold Therapy Wrap and Gel Bags are designed to last for months following your surgery. Never heat the Gel Bags unless specified by your healthcare provider. Supervision is advised when using this product on children or geriatric patients. To avoid danger of suffocation, please keep the outer plastic packaging away from children & pets.    Cold Therapy Instructions  Place Gel Bags in a freezer set ¾ of the way to max temperature for at least (4) hours. For best results, lay the Gel Bags flat and [id number] in the freezer. Once frozen, slide Gel Bags into the gel pouch and secure your wrap to the affected area with the straps.  Gel wraps that have been stored in a freezer for an extended period of time may require a (10) minute period of softening up in a room temperature environment before application.  The gel pouch acts as a protective barrier. NEVER place frozen bags directly onto skin, as this may cause frostbite injury.  The SMI Cold Therapy Wrap is designed to be able to be  worm while ambulating. The compression straps can be secured well enough so that the Wrap won't fall off while moving.  Wrap Application Videos can be viewed at [employer].  An additional protective barrier such as clothing, a washcloth, hand-towel or pillowcase may be used during prolonged treatment applications.  The Gel-Pouch and Wrap are both Latex-Free and the Gel Bag ingredients are non toxic.    [facility] Wrap care instructions  The [facility] Cold Therapy Wrap may be hand washed and hung to dry when needed.    [facility] re-order information  Additional [facility] body specific wraps and/or Gel Bags can be re-ordered from [employer] or call [employer] ([phone])      InfuBLOCK - Patient Information    What is a pain pump?  The InfuBLOCK pump delivers post-operative, non-narcotic, numbing medication to the nerve near the surgical site for pain relief.     Where can I find information about my pain pump?           For more information about your pain pump, scan the QR code.  For additional patient resources, visit [employer]/resources-pain-management.                                                                                               While your physician is your primary source for information about your treatment there may be times during your treatment that you need assistance with your infusion pump.     If you need assistance take the following steps:    The [facility] Hotline is Here for You 24/7.  Please call [phone] for the following concerns or complications:    Answers to questions about your infusion pump                 Tubing disconnect  Assistance with pump alarms                                                      Dislodged catheter  Excessive leakage noted from pump                                         Inadequate pain control    2.   [facility] Anesthesia Acute Pain Service: [phone] is available 24/7 for any further needs or concerns about  medication or pain control.     -------------------------------------------------------------------------    Nerve Catheter Removal Instructions  When your device is empty:    Remove your catheter by pulling the dressing off slowly (like you would remove a regular bandage). The catheter should pull right out of the skin.  Check that the BLUE tip is intact.                                                                                     If the catheter is stuck, reposition your   extremity and pull slowly until removed.  *If catheter is HURTING and WON'T come out, stop and call [phone] for further assistance.    Remove medication bag from the black carrying case.  Cut the tubing on right and left side of pump, and discard the medication bag and tubing into garbage.  Place the pump and black carrying case into the plastic bag and then place this into the return box.  Seal box with blue stickers and return to [employer] postal service. THIS IS PRE-PAID POSTAGE.        -------------------------------------------------------------------------    [facility] COLD THERAPY - PATIENT INSTRUCTION SHEET    Cold Compression Therapy for your comfort and rehabilitation  Your caregivers want you to be productive in your rehab and comfortable during your stay. In keeping with those goals, you will be receiving an [facility] Cold Therapy Wrap to help ease post-operative pain and swelling that might keep you from getting back on track! Your SMI Cold Therapy Wrap is effective and simple-to-use, and you will be encouraged to apply it throughout your hospital stay and at home through the duration of your recovery.    When you are ready to go home  Be sure to take your SMI Cold Therapy Wrap and both sets of Gel Bags with you for continued comfort and use throughout your rehabilitation. If you don't already have them, ask your nurse or aide to retrieve your SMI Gel Bags from the patient freezer.    Home use precautions  Always follow your medical  professional's application instructions upon discharge. Your SMI Cold Therapy Wrap and Gel Bags are designed to last for months following your surgery. Never heat the Gel Bags unless specified by your healthcare provider. Supervision is advised when using this product on children or geriatric patients. To avoid danger of suffocation, please keep the outer plastic packaging away from children & pets.    Cold Therapy Instructions  Place Gel Bags in a freezer set ¾ of the way to max temperature for at least (4) hours. For best results, lay the Gel Bags flat and [id number] in the freezer. Once frozen, slide Gel Bags into the gel pouch and secure your wrap to the affected area with the straps.  Gel wraps that have been stored in a freezer for an extended period of time may require a (10) minute period of softening up in a room temperature environment before application.  The gel pouch acts as a protective barrier. NEVER place frozen bags directly onto skin, as this may cause frostbite injury.  The SMI Cold Therapy Wrap is designed to be able to be worm while ambulating. The compression straps can be secured well enough so that the Wrap won't fall off while moving.  Wrap Application Videos can be viewed at [email or web address].  An additional protective barrier such as clothing, a washcloth, hand-towel or pillowcase may be used during prolonged treatment applications.  The Gel-Pouch and Wrap are both Latex-Free and the Gel Bag ingredients are non toxic.    SMI Wrap care instructions  The [facility] Cold Therapy Wrap may be hand washed and hung to dry when needed.    [facility] re-order information  Additional [facility] body specific wraps and/or Gel Bags can be re-ordered from [employer] or call [email or web address] ([phone])

## 2025-05-02 RX ORDER — ASPIRIN 81 MG/1
81 TABLET ORAL EVERY 12 HOURS
Qty: 84 TABLET | Refills: 0 | Status: SHIPPED | OUTPATIENT
Start: 2025-05-02 | End: 2025-06-13

## 2025-05-03 LAB
BACTERIA SPEC ANAEROBE CULT: NORMAL

## 2025-05-06 ENCOUNTER — TRANSCRIBE ORDERS (OUTPATIENT)
Dept: LAB | Facility: HOSPITAL | Age: 65
End: 2025-05-06
Payer: COMMERCIAL

## 2025-05-06 ENCOUNTER — LAB (OUTPATIENT)
Dept: LAB | Facility: HOSPITAL | Age: 65
End: 2025-05-06
Payer: COMMERCIAL

## 2025-05-06 DIAGNOSIS — Z79.2 ENCOUNTER FOR LONG-TERM (CURRENT) USE OF ANTIBIOTICS: ICD-10-CM

## 2025-05-06 DIAGNOSIS — Z79.2 ENCOUNTER FOR LONG-TERM (CURRENT) USE OF ANTIBIOTICS: Primary | ICD-10-CM

## 2025-05-06 LAB
ALBUMIN SERPL-MCNC: 3.3 G/DL (ref 3.5–5.2)
ALBUMIN/GLOB SERPL: 0.9 G/DL
ALP SERPL-CCNC: 175 U/L (ref 39–117)
ALT SERPL W P-5'-P-CCNC: 97 U/L (ref 1–33)
ANION GAP SERPL CALCULATED.3IONS-SCNC: 16 MMOL/L (ref 5–15)
AST SERPL-CCNC: 58 U/L (ref 1–32)
BASOPHILS # BLD AUTO: 0.05 10*3/MM3 (ref 0–0.2)
BASOPHILS NFR BLD AUTO: 0.6 % (ref 0–1.5)
BILIRUB SERPL-MCNC: 0.4 MG/DL (ref 0–1.2)
BUN SERPL-MCNC: 19 MG/DL (ref 8–23)
BUN/CREAT SERPL: 11.7 (ref 7–25)
CALCIUM SPEC-SCNC: 9.2 MG/DL (ref 8.6–10.5)
CHLORIDE SERPL-SCNC: 90 MMOL/L (ref 98–107)
CO2 SERPL-SCNC: 20 MMOL/L (ref 22–29)
CREAT SERPL-MCNC: 1.62 MG/DL (ref 0.57–1)
CRP SERPL-MCNC: 21.2 MG/DL (ref 0–0.5)
DEPRECATED RDW RBC AUTO: 52.6 FL (ref 37–54)
EGFRCR SERPLBLD CKD-EPI 2021: 35.6 ML/MIN/1.73
EOSINOPHIL # BLD AUTO: 0.3 10*3/MM3 (ref 0–0.4)
EOSINOPHIL NFR BLD AUTO: 3.8 % (ref 0.3–6.2)
ERYTHROCYTE [DISTWIDTH] IN BLOOD BY AUTOMATED COUNT: 19.9 % (ref 12.3–15.4)
ERYTHROCYTE [SEDIMENTATION RATE] IN BLOOD: 95 MM/HR (ref 0–30)
GLOBULIN UR ELPH-MCNC: 3.7 GM/DL
GLUCOSE SERPL-MCNC: 83 MG/DL (ref 65–99)
HCT VFR BLD AUTO: 29.2 % (ref 34–46.6)
HGB BLD-MCNC: 8.4 G/DL (ref 12–15.9)
IMM GRANULOCYTES # BLD AUTO: 0.08 10*3/MM3 (ref 0–0.05)
IMM GRANULOCYTES NFR BLD AUTO: 1 % (ref 0–0.5)
LYMPHOCYTES # BLD AUTO: 0.62 10*3/MM3 (ref 0.7–3.1)
LYMPHOCYTES NFR BLD AUTO: 7.9 % (ref 19.6–45.3)
MCH RBC QN AUTO: 20.9 PG (ref 26.6–33)
MCHC RBC AUTO-ENTMCNC: 28.8 G/DL (ref 31.5–35.7)
MCV RBC AUTO: 72.6 FL (ref 79–97)
MONOCYTES # BLD AUTO: 0.78 10*3/MM3 (ref 0.1–0.9)
MONOCYTES NFR BLD AUTO: 9.9 % (ref 5–12)
NEUTROPHILS NFR BLD AUTO: 6.02 10*3/MM3 (ref 1.7–7)
NEUTROPHILS NFR BLD AUTO: 76.8 % (ref 42.7–76)
NRBC BLD AUTO-RTO: 0 /100 WBC (ref 0–0.2)
PLATELET # BLD AUTO: 245 10*3/MM3 (ref 140–450)
PMV BLD AUTO: 9.3 FL (ref 6–12)
POTASSIUM SERPL-SCNC: 4 MMOL/L (ref 3.5–5.2)
PROT SERPL-MCNC: 7 G/DL (ref 6–8.5)
RBC # BLD AUTO: 4.02 10*6/MM3 (ref 3.77–5.28)
SODIUM SERPL-SCNC: 126 MMOL/L (ref 136–145)
WBC NRBC COR # BLD AUTO: 7.85 10*3/MM3 (ref 3.4–10.8)

## 2025-05-06 PROCEDURE — 36415 COLL VENOUS BLD VENIPUNCTURE: CPT

## 2025-05-06 PROCEDURE — 85025 COMPLETE CBC W/AUTO DIFF WBC: CPT

## 2025-05-06 PROCEDURE — 80053 COMPREHEN METABOLIC PANEL: CPT

## 2025-05-06 PROCEDURE — 86140 C-REACTIVE PROTEIN: CPT

## 2025-05-06 PROCEDURE — 85652 RBC SED RATE AUTOMATED: CPT

## 2025-05-08 LAB
BACTERIA SPEC ANAEROBE CULT: NORMAL

## 2025-05-13 ENCOUNTER — LAB (OUTPATIENT)
Dept: LAB | Facility: HOSPITAL | Age: 65
End: 2025-05-13
Payer: COMMERCIAL

## 2025-05-13 ENCOUNTER — TRANSCRIBE ORDERS (OUTPATIENT)
Dept: LAB | Facility: HOSPITAL | Age: 65
End: 2025-05-13
Payer: COMMERCIAL

## 2025-05-13 DIAGNOSIS — Z79.2 ENCOUNTER FOR LONG-TERM (CURRENT) USE OF ANTIBIOTICS: Primary | ICD-10-CM

## 2025-05-13 DIAGNOSIS — Z79.2 ENCOUNTER FOR LONG-TERM (CURRENT) USE OF ANTIBIOTICS: ICD-10-CM

## 2025-05-13 LAB
ALBUMIN SERPL-MCNC: 3.2 G/DL (ref 3.5–5.2)
ALBUMIN/GLOB SERPL: 0.9 G/DL
ALP SERPL-CCNC: 103 U/L (ref 39–117)
ALT SERPL W P-5'-P-CCNC: 29 U/L (ref 1–33)
ANION GAP SERPL CALCULATED.3IONS-SCNC: 15 MMOL/L (ref 5–15)
ANISOCYTOSIS BLD QL: NORMAL
AST SERPL-CCNC: 29 U/L (ref 1–32)
BASOPHILS # BLD AUTO: 0.06 10*3/MM3 (ref 0–0.2)
BASOPHILS NFR BLD AUTO: 0.8 % (ref 0–1.5)
BILIRUB SERPL-MCNC: 0.2 MG/DL (ref 0–1.2)
BUN SERPL-MCNC: 18 MG/DL (ref 8–23)
BUN/CREAT SERPL: 12.8 (ref 7–25)
CALCIUM SPEC-SCNC: 8.7 MG/DL (ref 8.6–10.5)
CHLORIDE SERPL-SCNC: 95 MMOL/L (ref 98–107)
CO2 SERPL-SCNC: 21 MMOL/L (ref 22–29)
CREAT SERPL-MCNC: 1.41 MG/DL (ref 0.57–1)
CRP SERPL-MCNC: 7.04 MG/DL (ref 0–0.5)
DEPRECATED RDW RBC AUTO: 56.9 FL (ref 37–54)
EGFRCR SERPLBLD CKD-EPI 2021: 42 ML/MIN/1.73
EOSINOPHIL # BLD AUTO: 0.22 10*3/MM3 (ref 0–0.4)
EOSINOPHIL NFR BLD AUTO: 2.9 % (ref 0.3–6.2)
ERYTHROCYTE [DISTWIDTH] IN BLOOD BY AUTOMATED COUNT: 21.3 % (ref 12.3–15.4)
GLOBULIN UR ELPH-MCNC: 3.4 GM/DL
GLUCOSE SERPL-MCNC: 99 MG/DL (ref 65–99)
HCT VFR BLD AUTO: 32.2 % (ref 34–46.6)
HGB BLD-MCNC: 8.6 G/DL (ref 12–15.9)
HYPOCHROMIA BLD QL: NORMAL
IMM GRANULOCYTES # BLD AUTO: 0.17 10*3/MM3 (ref 0–0.05)
IMM GRANULOCYTES NFR BLD AUTO: 2.2 % (ref 0–0.5)
LYMPHOCYTES # BLD AUTO: 0.87 10*3/MM3 (ref 0.7–3.1)
LYMPHOCYTES NFR BLD AUTO: 11.3 % (ref 19.6–45.3)
MCH RBC QN AUTO: 20.8 PG (ref 26.6–33)
MCHC RBC AUTO-ENTMCNC: 26.7 G/DL (ref 31.5–35.7)
MCV RBC AUTO: 77.8 FL (ref 79–97)
MICROCYTES BLD QL: NORMAL
MONOCYTES # BLD AUTO: 0.55 10*3/MM3 (ref 0.1–0.9)
MONOCYTES NFR BLD AUTO: 7.1 % (ref 5–12)
NEUTROPHILS NFR BLD AUTO: 5.83 10*3/MM3 (ref 1.7–7)
NEUTROPHILS NFR BLD AUTO: 75.7 % (ref 42.7–76)
NRBC BLD AUTO-RTO: 0 /100 WBC (ref 0–0.2)
PLAT MORPH BLD: NORMAL
PLATELET # BLD AUTO: 257 10*3/MM3 (ref 140–450)
PMV BLD AUTO: 9 FL (ref 6–12)
POTASSIUM SERPL-SCNC: 4.1 MMOL/L (ref 3.5–5.2)
PROT SERPL-MCNC: 6.6 G/DL (ref 6–8.5)
RBC # BLD AUTO: 4.14 10*6/MM3 (ref 3.77–5.28)
SODIUM SERPL-SCNC: 131 MMOL/L (ref 136–145)
WBC MORPH BLD: NORMAL
WBC NRBC COR # BLD AUTO: 7.7 10*3/MM3 (ref 3.4–10.8)

## 2025-05-13 PROCEDURE — 80053 COMPREHEN METABOLIC PANEL: CPT

## 2025-05-13 PROCEDURE — 85025 COMPLETE CBC W/AUTO DIFF WBC: CPT

## 2025-05-13 PROCEDURE — 86140 C-REACTIVE PROTEIN: CPT

## 2025-05-13 PROCEDURE — 85007 BL SMEAR W/DIFF WBC COUNT: CPT

## 2025-05-13 PROCEDURE — 36415 COLL VENOUS BLD VENIPUNCTURE: CPT

## 2025-05-20 ENCOUNTER — LAB (OUTPATIENT)
Dept: LAB | Facility: HOSPITAL | Age: 65
End: 2025-05-20
Payer: COMMERCIAL

## 2025-05-20 ENCOUNTER — TRANSCRIBE ORDERS (OUTPATIENT)
Dept: LAB | Facility: HOSPITAL | Age: 65
End: 2025-05-20
Payer: COMMERCIAL

## 2025-05-20 DIAGNOSIS — L03.114 CELLULITIS OF LEFT HAND EXCLUDING FINGERS AND THUMB: ICD-10-CM

## 2025-05-20 DIAGNOSIS — T84.59XD INFECTED PROSTHETIC KNEE JOINT, SUBSEQUENT ENCOUNTER: ICD-10-CM

## 2025-05-20 DIAGNOSIS — C50.919 MALIGNANT NEOPLASM OF FEMALE BREAST, UNSPECIFIED ESTROGEN RECEPTOR STATUS, UNSPECIFIED LATERALITY, UNSPECIFIED SITE OF BREAST: ICD-10-CM

## 2025-05-20 DIAGNOSIS — Z96.659 INFECTED PROSTHETIC KNEE JOINT, SUBSEQUENT ENCOUNTER: ICD-10-CM

## 2025-05-20 DIAGNOSIS — Z96.622 PRESENCE OF LEFT ARTIFICIAL ELBOW JOINT: ICD-10-CM

## 2025-05-20 DIAGNOSIS — Z96.622 PRESENCE OF LEFT ARTIFICIAL ELBOW JOINT: Primary | ICD-10-CM

## 2025-05-20 LAB
ALBUMIN SERPL-MCNC: 3.5 G/DL (ref 3.5–5.2)
ALBUMIN/GLOB SERPL: 0.9 G/DL
ALP SERPL-CCNC: 95 U/L (ref 39–117)
ALT SERPL W P-5'-P-CCNC: 34 U/L (ref 1–33)
ANION GAP SERPL CALCULATED.3IONS-SCNC: 15 MMOL/L (ref 5–15)
AST SERPL-CCNC: 35 U/L (ref 1–32)
BASOPHILS # BLD AUTO: 0.05 10*3/MM3 (ref 0–0.2)
BASOPHILS NFR BLD AUTO: 0.9 % (ref 0–1.5)
BILIRUB SERPL-MCNC: 0.2 MG/DL (ref 0–1.2)
BUN SERPL-MCNC: 18 MG/DL (ref 8–23)
BUN/CREAT SERPL: 11.3 (ref 7–25)
CALCIUM SPEC-SCNC: 8.9 MG/DL (ref 8.6–10.5)
CHLORIDE SERPL-SCNC: 96 MMOL/L (ref 98–107)
CO2 SERPL-SCNC: 21 MMOL/L (ref 22–29)
CREAT SERPL-MCNC: 1.59 MG/DL (ref 0.57–1)
CRP SERPL-MCNC: 7.89 MG/DL (ref 0–0.5)
DEPRECATED RDW RBC AUTO: 59 FL (ref 37–54)
EGFRCR SERPLBLD CKD-EPI 2021: 36.4 ML/MIN/1.73
EOSINOPHIL # BLD AUTO: 0.3 10*3/MM3 (ref 0–0.4)
EOSINOPHIL NFR BLD AUTO: 5.1 % (ref 0.3–6.2)
ERYTHROCYTE [DISTWIDTH] IN BLOOD BY AUTOMATED COUNT: 22.3 % (ref 12.3–15.4)
ERYTHROCYTE [SEDIMENTATION RATE] IN BLOOD: 107 MM/HR (ref 0–30)
GLOBULIN UR ELPH-MCNC: 3.7 GM/DL
GLUCOSE SERPL-MCNC: 87 MG/DL (ref 65–99)
HCT VFR BLD AUTO: 34.3 % (ref 34–46.6)
HGB BLD-MCNC: 9.9 G/DL (ref 12–15.9)
IMM GRANULOCYTES # BLD AUTO: 0.04 10*3/MM3 (ref 0–0.05)
IMM GRANULOCYTES NFR BLD AUTO: 0.7 % (ref 0–0.5)
LYMPHOCYTES # BLD AUTO: 0.86 10*3/MM3 (ref 0.7–3.1)
LYMPHOCYTES NFR BLD AUTO: 14.6 % (ref 19.6–45.3)
MCH RBC QN AUTO: 21.3 PG (ref 26.6–33)
MCHC RBC AUTO-ENTMCNC: 28.9 G/DL (ref 31.5–35.7)
MCV RBC AUTO: 73.8 FL (ref 79–97)
MONOCYTES # BLD AUTO: 0.37 10*3/MM3 (ref 0.1–0.9)
MONOCYTES NFR BLD AUTO: 6.3 % (ref 5–12)
NEUTROPHILS NFR BLD AUTO: 4.26 10*3/MM3 (ref 1.7–7)
NEUTROPHILS NFR BLD AUTO: 72.4 % (ref 42.7–76)
NRBC BLD AUTO-RTO: 0 /100 WBC (ref 0–0.2)
PLATELET # BLD AUTO: 231 10*3/MM3 (ref 140–450)
PMV BLD AUTO: 9.1 FL (ref 6–12)
POTASSIUM SERPL-SCNC: 3.7 MMOL/L (ref 3.5–5.2)
PROT SERPL-MCNC: 7.2 G/DL (ref 6–8.5)
RBC # BLD AUTO: 4.65 10*6/MM3 (ref 3.77–5.28)
SODIUM SERPL-SCNC: 132 MMOL/L (ref 136–145)
WBC NRBC COR # BLD AUTO: 5.88 10*3/MM3 (ref 3.4–10.8)

## 2025-05-20 PROCEDURE — 85652 RBC SED RATE AUTOMATED: CPT

## 2025-05-20 PROCEDURE — 80053 COMPREHEN METABOLIC PANEL: CPT

## 2025-05-20 PROCEDURE — 36415 COLL VENOUS BLD VENIPUNCTURE: CPT

## 2025-05-20 PROCEDURE — 86140 C-REACTIVE PROTEIN: CPT

## 2025-05-20 PROCEDURE — 85025 COMPLETE CBC W/AUTO DIFF WBC: CPT

## 2025-05-27 ENCOUNTER — TRANSCRIBE ORDERS (OUTPATIENT)
Dept: PHYSICAL THERAPY | Facility: HOSPITAL | Age: 65
End: 2025-05-27
Payer: COMMERCIAL

## 2025-05-27 DIAGNOSIS — T14.8XXA OPEN WOUND: Primary | ICD-10-CM

## 2025-05-28 ENCOUNTER — HOSPITAL ENCOUNTER (OUTPATIENT)
Dept: PHYSICAL THERAPY | Facility: HOSPITAL | Age: 65
Setting detail: THERAPIES SERIES
Discharge: HOME | End: 2025-05-28
Payer: COMMERCIAL

## 2025-05-28 DIAGNOSIS — S51.002D OPEN WOUND OF LEFT ELBOW, SUBSEQUENT ENCOUNTER: Primary | ICD-10-CM

## 2025-05-28 DIAGNOSIS — I89.0 LYMPHEDEMA OF LEFT UPPER EXTREMITY: ICD-10-CM

## 2025-05-28 PROCEDURE — 97597 DBRDMT OPN WND 1ST 20 CM/<: CPT

## 2025-05-28 PROCEDURE — 97162 PT EVAL MOD COMPLEX 30 MIN: CPT

## 2025-05-28 NOTE — THERAPY EVALUATION
Outpatient Rehabilitation - Wound/Debridement Initial Eval   [place]     Patient Name: Britney Raman  : 1960  MRN: [id number]  Today's Date: 2025                  Admit Date: 2025    Visit Dx:    ICD-10-CM ICD-9-CM   1. Open wound of left elbow, subsequent encounter  S51.002D V58.89     881.01   2. 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, initial encounter    Infection of prosthetic shoulder joint        Past Medical History:   Diagnosis Date    Arthritis     Breast cancer 2018    Left - had mastectomy and subsequent chemo and radiation - new diagnosis () of right breast CA and mets to spine    Cataract     MILD    GERD (gastroesophageal reflux disease)     Hx MRSA infection 2018    right arm x 2 and right leg; most recent treatment 2018    Knee pain     LEFT MAINLY    PONV (postoperative nausea and vomiting)     AFTER BREAST SURGERY- HAD WATER AND THREW IT UP IMMEDIATELY    Urinary incontinence in female     UTI (urinary tract infection)     Wears glasses         Past Surgical History:   Procedure Laterality Date    BREAST BIOPSY Bilateral     DIAGNOSTIC LAPAROSCOPY      ENDOSCOPY      EYE SURGERY Bilateral     AS CHILD    INJECTION OF MEDICATION      X7 LEFT KNEE    MASTECTOMY Left 2018    PORTACATH PLACEMENT      TOTAL ELBOW ARTHROPLASTY Left 2022    Procedure: TOTAL ELBOW ARTHROPLASTY FOR FRACTURE,  ULNAR NERVE TRANSPOSITION;  Surgeon: Juan David Sunshine MD;  Location: [facility];  Service: Orthopedics;  Laterality: Left;    TOTAL ELBOW ARTHROPLASTY Left 2025    Procedure: TOTAL ELBOW REVISION, IRRIGATION AND DEBRIDEMENT LEFT;  Surgeon: Juan David Sunshine,  MD;  Location: [facility];  Service: Orthopedics;  Laterality: Left;    TUBAL ABDOMINAL LIGATION          Patient History       Row Name 05/28/25 0900             History    Chief Complaint Ulcer, wound or other skin conditions;Swelling;Pain  -JM      Brief Description of Current Complaint Pt is s/p L elbow debridement and revision of TEA in April, now presenting with ulcer to posterior/medial L elbow area, was seen by Dr. Sunshine's PA in office this week and urgent wound care referral placed.  Pt currently performing daily IV abx at home with weekly follow-up at [facility] office.  -CATHLEEN      How has patient tried to help current problem? Spouse has been cleaning wound daily with soap/water and applying simple gauze dressings as instructed by provider  -         Services    Are you currently receiving Home Health services No  -                User Key  (r) = Recorded By, (t) = Taken By, (c) = Cosigned By      Initials Name Provider Type    Danitza Campuzano, PT Physical Therapist                    EVALUATION   PT Ortho       Row Name 05/28/25 0900       Subjective    Subjective Comments Pt reports pain in wound area, did not take any pain medication today.  States she has PICC line dressing changes and [facility] follow-up on Thursdays, hopes to coordinate wound care appts since they drive from 2 hrs away.  -CATHLEEN       Precautions and Contraindications    Precautions LUE lymphedema s/p breast CA  -       Subjective Pain    Able to rate subjective pain? yes  -JM    Pre-Treatment Pain Level 4  -JM    Post-Treatment Pain Level 4  -JM       Transfers    Bed-Chair Cass (Transfers) standby assist  -CATHLEEN    Chair-Bed Cass (Transfers) standby assist  -    Comment, (Transfers) to/from dept in w/c, seated for tx  -              User Key  (r) = Recorded By, (t) = Taken By, (c) = Cosigned By      Initials Name Provider Type    Danitza Campuzano, PT Physical Therapist                   LDA Wound       Row Name  "05/28/25 0900             Wound 05/28/25 0900 Left medial elbow    Wound - Properties Group Placement Date: 05/28/25  - Placement Time: 0900 -JM Side: Left  - Orientation: medial  -JM Location: elbow  -    Wound Image Images linked: 1  -JM      Dressing Appearance intact;moist drainage  -JM      Dressing Removed Type gauze;elastic bandage  -      Confirmed Empty Wound Bed Yes, visual inspection of wound bed  -      Base moist;pink;yellow;subcutaneous;slough  -      Periwound intact;dry;swelling;maroon/purple  dusky purple periwound  -      Periwound Temperature warm  -      Periwound Skin Turgor soft  -      Edges open  -      Wound Length (cm) 1.4 cm  -JM      Wound Width (cm) 1.5 cm  -JM      Wound Depth (cm) 0.1 cm  full depth obscured  -      Wound Surface Area (cm^2) 1.65 cm^2  -JM      Wound Volume (cm^3) 0.11 cm^3  -JM      Drainage Characteristics/Odor serosanguineous  -      Drainage Amount moderate  -      Care, Wound cleansed with;wound cleanser;debrided  vashe soak  -      Dressing Care dressing applied;silver impregnated;foam;silicone border foam;cotton;elastic bandage  mepilex ag, 4\" optifoam, cast padding, size 5 spandage  -      Periwound Care cleansed with pH balanced cleanser;dry periwound area maintained;barrier film applied  nosting spray  -      Retired Wound - Properties Group Placement Date: 05/28/25  - Placement Time: 0900 - Side: Left  - Orientation: medial  - Location: elbow  -    Retired Wound - Properties Group Placement Date: 05/28/25  - Placement Time: 0900 - Side: Left  - Orientation: medial  - Location: elbow  -    Retired Wound - Properties Group Date first assessed: 05/28/25  - Time first assessed: 0900 - Side: Left  - Location: elbow  -              User Key  (r) = Recorded By, (t) = Taken By, (c) = Cosigned By      Initials Name Provider Type    Danitza Campuzano PT Physical Therapist                      WOUND " "DEBRIDEMENT  Total area of Debridement: 2cmsq  Debridement Site 1  Location- Site 1: L elbow wound  Selective Debridement- Site 1: Wound Surface <20cmsq  Instruments- Site 1: tweezers  Excised Tissue Description- Site 1: minimum, slough, other (comment) (crusts)  Bleeding- Site 1: none              Therapy Education       Row Name 05/28/25 0900             Therapy Education    Education Details Keep wound bandaged and avoid getting dressing wet or getting water in wound.  Change dressing every 2 days or PRN if saturated.  Wash LUE with theraworx and wipes, apply vashe-soaked gauze to open wound x5min, pat dry.  Apply mepilex ag sticky side against wound and cover with 4\" optifoam gentle.  Cast padding and spandage or ace wrap as needed to secure dressing.  Recommended compression to LUE but pt declines at this time.  -JM      Given Bandaging/dressing change;Edema management;Symptoms/condition management  -      Program New  -      How Provided Verbal;Demonstration  -CATHLEEN      Provided to Patient;Caregiver  -      Level of Understanding Verbalized;Teach back education performed  -                User Key  (r) = Recorded By, (t) = Taken By, (c) = Cosigned By      Initials Name Provider Type    Danitza Campuzano, PT Physical Therapist                    Recommendation and Plan   PT Assessment/Plan       Row Name 05/28/25 0900          PT Assessment    Functional Limitations Performance in self-care ADL;Other (comment)  wound management limitations  -     Impairments Edema;Integumentary integrity;Pain  -     Assessment Comments Patient present with open wound to medial L elbow in setting of lymphedema s/p breast CA tx and 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  Open wound is medial to surgical site with moist yellow/pink wound bed, min dusky color to periwound but no erythema or S&S of infection.  PT initiated use of ag foam " dressings to manage exudate and provide antimicrobial wound environment.  PT recommended light compression to LUE but pt declines compression wrapping at this time due to pain.  PT instructed pt/spouse in home dressing changes and elevation for edema reduction.  Pt currently driving from 2 hrs away so tx frequency limited.  Pt will benefit from skilled PT for wound debridement and advanced dressings management, with compression as tolerated.  -     Rehab Potential Fair  -     Patient/caregiver participated in establishment of treatment plan and goals Yes  -     Patient would benefit from skilled therapy intervention Yes  -        PT Plan    PT Frequency 1x/week  -     Predicted Duration of Therapy Intervention (PT) 12 visits  -     Planned CPT's? PT EVAL MOD COMPLELITY: 56546;PT QUETA DEBRIDE OPEN WOUND UP TO 20 CM: 81040;PT NONSELECT DEBRIDE 15 MIN: 85346;PT SELF CARE/MGMT/TRAIN 15 MIN: 68303  potential UE multilayer wrapping  -     Physical Therapy Interventions (Optional Details) patient/family education;wound care  -     PT Plan Comments debridement, dressings, UE compression if tolerated  -               User Key  (r) = Recorded By, (t) = Taken By, (c) = Cosigned By      Initials Name Provider Type    Danitza Campuzano, PT Physical Therapist                      Goals   PT OP Goals       Row Name 05/28/25 1019 05/28/25 0900       PT Short Term Goals    STG 1 -- Patient/family to verbalize S&S of infection and when to seek medical attention.  -    STG 2 -- Reduce wound drainage to minimal amount to faciliate wound closure.  -    STG 3 -- Reduce L elbow wound dimensions by 50% as evidence of wound healing.  -       Long Term Goals    LTG 1 -- Patient/spouse to be independent with home dressing changes.  -    LTG 2 -- Reduce L elbow wound dimensions by 90% as evidence of wound healing.  -    LTG 3 -- Patient to report less than 3/10 pain in L elbow wound to faciliate improved LUE  function.  -CATHLEEN       Time Calculation    PT Goal Re-Cert Due Date 08/26/25  -CATHLEEN 08/26/25  -CATHLEEN              User Key  (r) = Recorded By, (t) = Taken By, (c) = Cosigned By      Initials Name Provider Type    Danitza Campuzano, PT Physical Therapist                    Time Calculation: Start Time: 0900  Untimed Charges  PT Eval/Re-eval Minutes: 60  Wound Care: 05903 Selective debridement  18093-Enxyugizs debridement: 15  Total Minutes  Untimed Charges Total Minutes: 75   Total Minutes: 75  Therapy Charges for Today       Code Description Service Date Service Provider Modifiers Qty    [id number] HC QUETA DEBRIDE OPEN WOUND UP TO 20CM 5/28/2025 Danitza Palm, PT GP 1    [id number] HC PT EVAL MOD COMPLEXITY 4 5/28/2025 Danitza Palm, PT GP 1                  Danitza Palm, PT  5/28/2025

## 2025-05-29 ENCOUNTER — LAB (OUTPATIENT)
Dept: LAB | Facility: HOSPITAL | Age: 65
End: 2025-05-29
Payer: COMMERCIAL

## 2025-05-29 ENCOUNTER — TRANSCRIBE ORDERS (OUTPATIENT)
Dept: LAB | Facility: HOSPITAL | Age: 65
End: 2025-05-29
Payer: COMMERCIAL

## 2025-05-29 DIAGNOSIS — C50.919 MALIGNANT NEOPLASM OF FEMALE BREAST, UNSPECIFIED ESTROGEN RECEPTOR STATUS, UNSPECIFIED LATERALITY, UNSPECIFIED SITE OF BREAST: ICD-10-CM

## 2025-05-29 DIAGNOSIS — L03.114 CELLULITIS OF LEFT HAND EXCLUDING FINGERS AND THUMB: ICD-10-CM

## 2025-05-29 DIAGNOSIS — Z79.2 ENCOUNTER FOR LONG-TERM (CURRENT) USE OF ANTIBIOTICS: Primary | ICD-10-CM

## 2025-05-29 DIAGNOSIS — Z79.2 ENCOUNTER FOR LONG-TERM (CURRENT) USE OF ANTIBIOTICS: ICD-10-CM

## 2025-05-29 LAB
ALBUMIN SERPL-MCNC: 3.4 G/DL (ref 3.5–5.2)
ALBUMIN/GLOB SERPL: 1.1 G/DL
ALP SERPL-CCNC: 81 U/L (ref 39–117)
ALT SERPL W P-5'-P-CCNC: 34 U/L (ref 1–33)
ANION GAP SERPL CALCULATED.3IONS-SCNC: 14 MMOL/L (ref 5–15)
ANISOCYTOSIS BLD QL: NORMAL
AST SERPL-CCNC: 30 U/L (ref 1–32)
BASOPHILS # BLD AUTO: 0.02 10*3/MM3 (ref 0–0.2)
BASOPHILS NFR BLD AUTO: 0.5 % (ref 0–1.5)
BILIRUB SERPL-MCNC: 0.2 MG/DL (ref 0–1.2)
BUN SERPL-MCNC: 14.1 MG/DL (ref 8–23)
BUN/CREAT SERPL: 8.7 (ref 7–25)
CALCIUM SPEC-SCNC: 8.6 MG/DL (ref 8.6–10.5)
CHLORIDE SERPL-SCNC: 99 MMOL/L (ref 98–107)
CO2 SERPL-SCNC: 23 MMOL/L (ref 22–29)
CREAT SERPL-MCNC: 1.63 MG/DL (ref 0.57–1)
CRP SERPL-MCNC: 7.15 MG/DL (ref 0–0.5)
DEPRECATED RDW RBC AUTO: 57.1 FL (ref 37–54)
EGFRCR SERPLBLD CKD-EPI 2021: 35.3 ML/MIN/1.73
ELLIPTOCYTES BLD QL SMEAR: NORMAL
EOSINOPHIL # BLD AUTO: 0.19 10*3/MM3 (ref 0–0.4)
EOSINOPHIL NFR BLD AUTO: 5.1 % (ref 0.3–6.2)
ERYTHROCYTE [DISTWIDTH] IN BLOOD BY AUTOMATED COUNT: 21.2 % (ref 12.3–15.4)
ERYTHROCYTE [SEDIMENTATION RATE] IN BLOOD: 81 MM/HR (ref 0–30)
GLOBULIN UR ELPH-MCNC: 3.1 GM/DL
GLUCOSE SERPL-MCNC: 108 MG/DL (ref 65–99)
HCT VFR BLD AUTO: 32.9 % (ref 34–46.6)
HGB BLD-MCNC: 9.6 G/DL (ref 12–15.9)
IMM GRANULOCYTES # BLD AUTO: 0.01 10*3/MM3 (ref 0–0.05)
IMM GRANULOCYTES NFR BLD AUTO: 0.3 % (ref 0–0.5)
LYMPHOCYTES # BLD AUTO: 0.5 10*3/MM3 (ref 0.7–3.1)
LYMPHOCYTES NFR BLD AUTO: 13.5 % (ref 19.6–45.3)
MCH RBC QN AUTO: 21.6 PG (ref 26.6–33)
MCHC RBC AUTO-ENTMCNC: 29.2 G/DL (ref 31.5–35.7)
MCV RBC AUTO: 74.1 FL (ref 79–97)
MONOCYTES # BLD AUTO: 0.26 10*3/MM3 (ref 0.1–0.9)
MONOCYTES NFR BLD AUTO: 7 % (ref 5–12)
NEUTROPHILS NFR BLD AUTO: 2.72 10*3/MM3 (ref 1.7–7)
NEUTROPHILS NFR BLD AUTO: 73.6 % (ref 42.7–76)
NRBC BLD AUTO-RTO: 0 /100 WBC (ref 0–0.2)
PLAT MORPH BLD: NORMAL
PLATELET # BLD AUTO: 136 10*3/MM3 (ref 140–450)
PMV BLD AUTO: 9.1 FL (ref 6–12)
POTASSIUM SERPL-SCNC: 3.8 MMOL/L (ref 3.5–5.2)
PROT SERPL-MCNC: 6.5 G/DL (ref 6–8.5)
RBC # BLD AUTO: 4.44 10*6/MM3 (ref 3.77–5.28)
SODIUM SERPL-SCNC: 136 MMOL/L (ref 136–145)
WBC MORPH BLD: NORMAL
WBC NRBC COR # BLD AUTO: 3.7 10*3/MM3 (ref 3.4–10.8)

## 2025-05-29 PROCEDURE — 85652 RBC SED RATE AUTOMATED: CPT

## 2025-05-29 PROCEDURE — 80053 COMPREHEN METABOLIC PANEL: CPT

## 2025-05-29 PROCEDURE — 85025 COMPLETE CBC W/AUTO DIFF WBC: CPT

## 2025-05-29 PROCEDURE — 36415 COLL VENOUS BLD VENIPUNCTURE: CPT

## 2025-05-29 PROCEDURE — 85007 BL SMEAR W/DIFF WBC COUNT: CPT

## 2025-05-29 PROCEDURE — 86140 C-REACTIVE PROTEIN: CPT

## 2025-06-05 ENCOUNTER — HOSPITAL ENCOUNTER (OUTPATIENT)
Dept: PHYSICAL THERAPY | Facility: HOSPITAL | Age: 65
Setting detail: THERAPIES SERIES
Discharge: HOME | End: 2025-06-05
Payer: COMMERCIAL

## 2025-06-05 ENCOUNTER — LAB (OUTPATIENT)
Dept: LAB | Facility: HOSPITAL | Age: 65
End: 2025-06-05
Payer: COMMERCIAL

## 2025-06-05 ENCOUNTER — TRANSCRIBE ORDERS (OUTPATIENT)
Dept: LAB | Facility: HOSPITAL | Age: 65
End: 2025-06-05
Payer: COMMERCIAL

## 2025-06-05 DIAGNOSIS — I89.0 LYMPHEDEMA OF LEFT UPPER EXTREMITY: ICD-10-CM

## 2025-06-05 DIAGNOSIS — L03.114 CELLULITIS OF LEFT ARM: Primary | ICD-10-CM

## 2025-06-05 DIAGNOSIS — L03.114 CELLULITIS OF LEFT ARM: ICD-10-CM

## 2025-06-05 DIAGNOSIS — S51.002D OPEN WOUND OF LEFT ELBOW, SUBSEQUENT ENCOUNTER: Primary | ICD-10-CM

## 2025-06-05 LAB
ALBUMIN SERPL-MCNC: 3.5 G/DL (ref 3.5–5.2)
ALBUMIN/GLOB SERPL: 1 G/DL
ALP SERPL-CCNC: 91 U/L (ref 39–117)
ALT SERPL W P-5'-P-CCNC: 36 U/L (ref 1–33)
ANION GAP SERPL CALCULATED.3IONS-SCNC: 15 MMOL/L (ref 5–15)
AST SERPL-CCNC: 38 U/L (ref 1–32)
BASOPHILS # BLD AUTO: 0.02 10*3/MM3 (ref 0–0.2)
BASOPHILS NFR BLD AUTO: 0.5 % (ref 0–1.5)
BILIRUB SERPL-MCNC: 0.2 MG/DL (ref 0–1.2)
BUN SERPL-MCNC: 17.7 MG/DL (ref 8–23)
BUN/CREAT SERPL: 11.4 (ref 7–25)
CALCIUM SPEC-SCNC: 8.6 MG/DL (ref 8.6–10.5)
CHLORIDE SERPL-SCNC: 99 MMOL/L (ref 98–107)
CO2 SERPL-SCNC: 22 MMOL/L (ref 22–29)
CREAT SERPL-MCNC: 1.55 MG/DL (ref 0.57–1)
CRP SERPL-MCNC: 6.06 MG/DL (ref 0–0.5)
DEPRECATED RDW RBC AUTO: 52.1 FL (ref 37–54)
EGFRCR SERPLBLD CKD-EPI 2021: 37.5 ML/MIN/1.73
EOSINOPHIL # BLD AUTO: 0.18 10*3/MM3 (ref 0–0.4)
EOSINOPHIL NFR BLD AUTO: 4.4 % (ref 0.3–6.2)
ERYTHROCYTE [DISTWIDTH] IN BLOOD BY AUTOMATED COUNT: 19.9 % (ref 12.3–15.4)
ERYTHROCYTE [SEDIMENTATION RATE] IN BLOOD: >130 MM/HR (ref 0–30)
GLOBULIN UR ELPH-MCNC: 3.5 GM/DL
GLUCOSE SERPL-MCNC: 130 MG/DL (ref 65–99)
HCT VFR BLD AUTO: 34.5 % (ref 34–46.6)
HGB BLD-MCNC: 10.1 G/DL (ref 12–15.9)
IMM GRANULOCYTES # BLD AUTO: 0.02 10*3/MM3 (ref 0–0.05)
IMM GRANULOCYTES NFR BLD AUTO: 0.5 % (ref 0–0.5)
LYMPHOCYTES # BLD AUTO: 0.52 10*3/MM3 (ref 0.7–3.1)
LYMPHOCYTES NFR BLD AUTO: 12.7 % (ref 19.6–45.3)
MCH RBC QN AUTO: 21.3 PG (ref 26.6–33)
MCHC RBC AUTO-ENTMCNC: 29.3 G/DL (ref 31.5–35.7)
MCV RBC AUTO: 72.6 FL (ref 79–97)
MONOCYTES # BLD AUTO: 0.32 10*3/MM3 (ref 0.1–0.9)
MONOCYTES NFR BLD AUTO: 7.8 % (ref 5–12)
NEUTROPHILS NFR BLD AUTO: 3.03 10*3/MM3 (ref 1.7–7)
NEUTROPHILS NFR BLD AUTO: 74.1 % (ref 42.7–76)
NRBC BLD AUTO-RTO: 0 /100 WBC (ref 0–0.2)
PLATELET # BLD AUTO: 167 10*3/MM3 (ref 140–450)
PMV BLD AUTO: 9.5 FL (ref 6–12)
POTASSIUM SERPL-SCNC: 3.6 MMOL/L (ref 3.5–5.2)
PROT SERPL-MCNC: 7 G/DL (ref 6–8.5)
RBC # BLD AUTO: 4.75 10*6/MM3 (ref 3.77–5.28)
SODIUM SERPL-SCNC: 136 MMOL/L (ref 136–145)
WBC NRBC COR # BLD AUTO: 4.09 10*3/MM3 (ref 3.4–10.8)

## 2025-06-05 PROCEDURE — 36415 COLL VENOUS BLD VENIPUNCTURE: CPT

## 2025-06-05 PROCEDURE — 86140 C-REACTIVE PROTEIN: CPT

## 2025-06-05 PROCEDURE — 97597 DBRDMT OPN WND 1ST 20 CM/<: CPT

## 2025-06-05 PROCEDURE — 85025 COMPLETE CBC W/AUTO DIFF WBC: CPT

## 2025-06-05 PROCEDURE — 80053 COMPREHEN METABOLIC PANEL: CPT

## 2025-06-05 PROCEDURE — 85652 RBC SED RATE AUTOMATED: CPT

## 2025-06-05 NOTE — THERAPY WOUND CARE TREATMENT
Outpatient Rehabilitation - Wound/Debridement Treatment Note   Kim     Patient Name: Britney Raman  : 1960  MRN: [id number]  Today's Date: 2025                 Admit Date: 2025    Visit Dx:    ICD-10-CM ICD-9-CM   1. Open wound of left elbow, subsequent encounter  S51.002D V58.89     881.01   2. 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, initial encounter    Infection of prosthetic shoulder joint        Past Medical History:   Diagnosis Date    Arthritis     Breast cancer 2018    Left - had mastectomy and subsequent chemo and radiation - new diagnosis () of right breast CA and mets to spine    Cataract     MILD    GERD (gastroesophageal reflux disease)     Hx MRSA infection 2018    right arm x 2 and right leg; most recent treatment 2018    Knee pain     LEFT MAINLY    PONV (postoperative nausea and vomiting)     AFTER BREAST SURGERY- HAD WATER AND THREW IT UP IMMEDIATELY    Urinary incontinence in female     UTI (urinary tract infection)     Wears glasses         Past Surgical History:   Procedure Laterality Date    BREAST BIOPSY Bilateral     DIAGNOSTIC LAPAROSCOPY      ENDOSCOPY      EYE SURGERY Bilateral     AS CHILD    INJECTION OF MEDICATION      X7 LEFT KNEE    MASTECTOMY Left 2018    PORTACATH PLACEMENT      TOTAL ELBOW ARTHROPLASTY Left 2022    Procedure: TOTAL ELBOW ARTHROPLASTY FOR FRACTURE,  ULNAR NERVE TRANSPOSITION;  Surgeon: Juan David Sunshine MD;  Location: [facility];  Service: Orthopedics;  Laterality: Left;    TOTAL ELBOW ARTHROPLASTY Left 2025    Procedure: TOTAL ELBOW REVISION, IRRIGATION AND DEBRIDEMENT LEFT;  Surgeon: Juan David Sunshine,  MD;  Location: [facility] license of [facility];  Service: Orthopedics;  Laterality: Left;    TUBAL ABDOMINAL LIGATION           EVALUATION   PT Ortho       Row Name 06/05/25 1415       Subjective    Subjective Comments No issues with dressing changes except states they didn't need to use the nosting wipes because the dressing stayed almost too well.  Pt states wound is not hurting like it used to, she continues on IV abx.  -       Precautions and Contraindications    Precautions LUE lymphedema s/p breast CA  -       Subjective Pain    Able to rate subjective pain? yes  -    Pre-Treatment Pain Level 0  -    Post-Treatment Pain Level 0  -    Subjective Pain Comment min pain with dressing removal only  -       Transfers    Bed-Chair [place] (Transfers) standby assist  -    Chair-Bed [place] (Transfers) standby assist  -    Comment, (Transfers) seated for tx, to/from dept in w/c with family assistance  -              User Key  (r) = Recorded By, (t) = Taken By, (c) = Cosigned By      Initials Name Provider Type    Danitza Campuzano, PT Physical Therapist                     [facility] Wound       Row Name 06/05/25 1415             Wound 05/28/25 0900 Left medial elbow    Wound - Properties Group Placement Date: 05/28/25  - Placement Time: 0900  - Side: Left  - Orientation: medial  - Location: elbow  -    Wound Image Images linked: 1  -JM      Dressing Appearance intact;moist drainage  -      Dressing Removed Type silver impregnated;foam;silicone border foam;tubular wrap  -      Confirmed Empty Wound Bed Yes, visual inspection of wound bed  -      Base moist;pink;yellow;subcutaneous;slough;granulating  min new granulation bud formation  -      Periwound intact;dry;swelling;maroon/purple  dusky purple periwound  -      Periwound Temperature warm  -      Periwound Skin Turgor soft  -      Edges open  -      Wound Length (cm) 1.4 cm  -JM      Wound Width (cm) 1.4 cm  -      Wound Depth (cm)  "0.1 cm  -      Wound Surface Area (cm^2) 1.54 cm^2  -      Wound Volume (cm^3) 0.103 cm^3  -      Drainage Characteristics/Odor serous;serosanguineous  -      Drainage Amount moderate;other (see comments)  min active weeping of serous fluid  -      Care, Wound cleansed with;wound cleanser;debrided  -      Dressing Care dressing applied;collagen;silver impregnated;foam;silicone border foam;cotton;elastic bandage  liam, mepilex ag, 4\" optifoam, cast padding, size 5 spandage  -      Periwound Care cleansed with pH balanced cleanser;dry periwound area maintained  -      Retired Wound - Properties Group Placement Date: 05/28/25  - Placement Time: 0900 - Side: Left  - Orientation: medial  - Location: elbow  -    Retired Wound - Properties Group Placement Date: 05/28/25  - Placement Time: 0900  - Side: Left  - Orientation: medial  - Location: elbow  -    Retired Wound - Properties Group Date first assessed: 05/28/25  - Time first assessed: 0900  - Side: Left  - Location: [facility]              User Key  (r) = Recorded By, (t) = Taken By, (c) = Cosigned By      Initials Name Provider Type    Danitza Campuzano, PT Physical Therapist                      WOUND DEBRIDEMENT  Total area of Debridement: 2cmsq  Debridement Site 1  Location- Site 1: L elbow wound  Selective Debridement- Site 1: Wound Surface <20cmsq  Instruments- Site 1: tweezers  Excised Tissue Description- Site 1: minimum, slough, other (comment) (loose crusts from intact incision)  Bleeding- Site 1: none              Therapy Education       Row Name 06/05/25 9592             Therapy Education    Education Details Continue dressing changes with addition of liam ag collagen to wound bed.  Recommended trial of compression to LUE next tx if pt agreeable.  -CATHLEEN      Given Bandaging/dressing change;Edema management;Symptoms/condition management  -CATHLEEN      Program Reinforced;Modified  -CATHLEEN      How Provided " Verbal;Demonstration  -      Provided to Patient;Caregiver  -      Level of Understanding Verbalized;Teach back education performed  -                User Key  (r) = Recorded By, (t) = Taken By, (c) = Cosigned By      Initials Name Provider Type    Danitza Campuzano, PT Physical Therapist                    Recommendation and Plan   PT Assessment/Plan       Row Name 06/05/25 1415          PT Assessment    Functional Limitations Performance in self-care ADL;Other (comment)  wound management limitations  -     Impairments Edema;Integumentary integrity;Pain  -     Assessment Comments Pt's L medial elbow wound improving with new granulation bud formation and pt reporting less pain.  Family able to perform home dressing changes without issues.  PT added liam ag collagen matrix to help faciliate wound granulation and closure.  Pt with active serous weeping from wound likely from lymphedema/edema, so PT recommended addition of compression next tx if pt is agreeable to help faciliate wound healing.  -     Rehab Potential Fair  -     Patient/caregiver participated in establishment of treatment plan and goals Yes  -     Patient would benefit from skilled therapy intervention Yes  -        PT Plan    PT Frequency 1x/week  -     Physical Therapy Interventions (Optional Details) patient/family education;wound care  -     PT Plan Comments debridement, dressings, ?add MLW if pt agreeable  -               User Key  (r) = Recorded By, (t) = Taken By, (c) = Cosigned By      Initials Name Provider Type    Danitza Campuzano, PT Physical Therapist                    Goals   PT OP Goals       Row Name 06/05/25 1515          Time Calculation    PT Goal Re-Cert Due Date 08/26/25  -               User Key  (r) = Recorded By, (t) = Taken By, (c) = Cosigned By      Initials Name Provider Type    Danitza Campuzano, PT Physical Therapist                    PT Goal Re-Cert Due Date: 08/26/25             Time Calculation: Start Time: 1415  Untimed Charges  59934-Wmugndgas debridement: 20  Total Minutes  Untimed Charges Total Minutes: 20   Total Minutes: 20  Therapy Charges for Today       Code Description Service Date Service Provider Modifiers Qty    80349103175 HC QUETA DEBRIDE OPEN WOUND UP TO 20CM 6/5/2025 Danitza Palm, PT GP 1                    Danitza Palm, PT  6/5/2025

## 2025-06-09 LAB
FUNGUS WND CULT: NORMAL
MYCOBACTERIUM SPEC CULT: NORMAL
NIGHT BLUE STAIN TISS: NORMAL

## 2025-06-12 ENCOUNTER — HOSPITAL ENCOUNTER (OUTPATIENT)
Dept: PHYSICAL THERAPY | Facility: HOSPITAL | Age: 65
Setting detail: THERAPIES SERIES
Discharge: HOME | End: 2025-06-12
Payer: MEDICARE

## 2025-06-12 ENCOUNTER — TRANSCRIBE ORDERS (OUTPATIENT)
Dept: LAB | Facility: HOSPITAL | Age: 65
End: 2025-06-12
Payer: COMMERCIAL

## 2025-06-12 ENCOUNTER — LAB (OUTPATIENT)
Dept: LAB | Facility: HOSPITAL | Age: 65
End: 2025-06-12
Payer: COMMERCIAL

## 2025-06-12 DIAGNOSIS — L03.114 CELLULITIS OF LEFT HAND EXCLUDING FINGERS AND THUMB: Primary | ICD-10-CM

## 2025-06-12 DIAGNOSIS — S51.002D OPEN WOUND OF LEFT ELBOW, SUBSEQUENT ENCOUNTER: Primary | ICD-10-CM

## 2025-06-12 DIAGNOSIS — I89.0 LYMPHEDEMA OF LEFT UPPER EXTREMITY: ICD-10-CM

## 2025-06-12 DIAGNOSIS — Z79.2 ENCOUNTER FOR LONG-TERM (CURRENT) USE OF ANTIBIOTICS: ICD-10-CM

## 2025-06-12 DIAGNOSIS — Z96.622 PRESENCE OF LEFT ARTIFICIAL ELBOW JOINT: ICD-10-CM

## 2025-06-12 DIAGNOSIS — T84.59XD INFECTED PROSTHETIC KNEE JOINT, SUBSEQUENT ENCOUNTER: ICD-10-CM

## 2025-06-12 DIAGNOSIS — Z96.659 INFECTED PROSTHETIC KNEE JOINT, SUBSEQUENT ENCOUNTER: ICD-10-CM

## 2025-06-12 DIAGNOSIS — L03.114 CELLULITIS OF LEFT HAND EXCLUDING FINGERS AND THUMB: ICD-10-CM

## 2025-06-12 LAB
ALBUMIN SERPL-MCNC: 3.2 G/DL (ref 3.5–5.2)
ALBUMIN/GLOB SERPL: 0.8 G/DL
ALP SERPL-CCNC: 87 U/L (ref 39–117)
ALT SERPL W P-5'-P-CCNC: 22 U/L (ref 1–33)
ANION GAP SERPL CALCULATED.3IONS-SCNC: 14 MMOL/L (ref 5–15)
AST SERPL-CCNC: 25 U/L (ref 1–32)
BASOPHILS # BLD AUTO: 0.03 10*3/MM3 (ref 0–0.2)
BASOPHILS NFR BLD AUTO: 0.7 % (ref 0–1.5)
BILIRUB SERPL-MCNC: 0.2 MG/DL (ref 0–1.2)
BUN SERPL-MCNC: 18.6 MG/DL (ref 8–23)
BUN/CREAT SERPL: 11 (ref 7–25)
CALCIUM SPEC-SCNC: 9 MG/DL (ref 8.6–10.5)
CHLORIDE SERPL-SCNC: 100 MMOL/L (ref 98–107)
CO2 SERPL-SCNC: 20 MMOL/L (ref 22–29)
CREAT SERPL-MCNC: 1.69 MG/DL (ref 0.57–1)
CRP SERPL-MCNC: 6.24 MG/DL (ref 0–0.5)
DEPRECATED RDW RBC AUTO: 52.8 FL (ref 37–54)
EGFRCR SERPLBLD CKD-EPI 2021: 33.8 ML/MIN/1.73
EOSINOPHIL # BLD AUTO: 0.17 10*3/MM3 (ref 0–0.4)
EOSINOPHIL NFR BLD AUTO: 3.8 % (ref 0.3–6.2)
ERYTHROCYTE [DISTWIDTH] IN BLOOD BY AUTOMATED COUNT: 20 % (ref 12.3–15.4)
GLOBULIN UR ELPH-MCNC: 4.2 GM/DL
GLUCOSE SERPL-MCNC: 86 MG/DL (ref 65–99)
HCT VFR BLD AUTO: 34.4 % (ref 34–46.6)
HGB BLD-MCNC: 10.1 G/DL (ref 12–15.9)
IMM GRANULOCYTES # BLD AUTO: 0.05 10*3/MM3 (ref 0–0.05)
IMM GRANULOCYTES NFR BLD AUTO: 1.1 % (ref 0–0.5)
LYMPHOCYTES # BLD AUTO: 0.55 10*3/MM3 (ref 0.7–3.1)
LYMPHOCYTES NFR BLD AUTO: 12.4 % (ref 19.6–45.3)
MCH RBC QN AUTO: 21.6 PG (ref 26.6–33)
MCHC RBC AUTO-ENTMCNC: 29.4 G/DL (ref 31.5–35.7)
MCV RBC AUTO: 73.5 FL (ref 79–97)
MONOCYTES # BLD AUTO: 0.34 10*3/MM3 (ref 0.1–0.9)
MONOCYTES NFR BLD AUTO: 7.7 % (ref 5–12)
NEUTROPHILS NFR BLD AUTO: 3.28 10*3/MM3 (ref 1.7–7)
NEUTROPHILS NFR BLD AUTO: 74.3 % (ref 42.7–76)
NRBC BLD AUTO-RTO: 0 /100 WBC (ref 0–0.2)
PLATELET # BLD AUTO: 161 10*3/MM3 (ref 140–450)
PMV BLD AUTO: 9 FL (ref 6–12)
POTASSIUM SERPL-SCNC: 4 MMOL/L (ref 3.5–5.2)
PROT SERPL-MCNC: 7.4 G/DL (ref 6–8.5)
RBC # BLD AUTO: 4.68 10*6/MM3 (ref 3.77–5.28)
SODIUM SERPL-SCNC: 134 MMOL/L (ref 136–145)
WBC NRBC COR # BLD AUTO: 4.42 10*3/MM3 (ref 3.4–10.8)

## 2025-06-12 PROCEDURE — 85025 COMPLETE CBC W/AUTO DIFF WBC: CPT

## 2025-06-12 PROCEDURE — 97597 DBRDMT OPN WND 1ST 20 CM/<: CPT

## 2025-06-12 PROCEDURE — 29584 APPL MLTLAY CMPRN SYS UP ARM: CPT

## 2025-06-12 PROCEDURE — 80053 COMPREHEN METABOLIC PANEL: CPT

## 2025-06-12 PROCEDURE — 36415 COLL VENOUS BLD VENIPUNCTURE: CPT

## 2025-06-12 PROCEDURE — 86140 C-REACTIVE PROTEIN: CPT

## 2025-06-12 NOTE — THERAPY TREATMENT NOTE
Outpatient Rehabilitation - Wound/Debridement Treatment Note   Kim     Patient Name: rBitney Raman  : 1960  MRN: [id number]  Today's Date: 2025                 Admit Date: 2025    Visit Dx:    ICD-10-CM ICD-9-CM   1. Open wound of left elbow, subsequent encounter  S51.002D V58.89     881.01   2. 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, initial encounter    Infection of prosthetic shoulder joint        Past Medical History:   Diagnosis Date    Arthritis     Breast cancer 2018    Left - had mastectomy and subsequent chemo and radiation - new diagnosis () of right breast CA and mets to spine    Cataract     MILD    GERD (gastroesophageal reflux disease)     Hx MRSA infection 2018    right arm x 2 and right leg; most recent treatment 2018    Knee pain     LEFT MAINLY    PONV (postoperative nausea and vomiting)     AFTER BREAST SURGERY- HAD WATER AND THREW IT UP IMMEDIATELY    Urinary incontinence in female     UTI (urinary tract infection)     Wears glasses         Past Surgical History:   Procedure Laterality Date    BREAST BIOPSY Bilateral     DIAGNOSTIC LAPAROSCOPY      ENDOSCOPY      EYE SURGERY Bilateral     AS CHILD    INJECTION OF MEDICATION      X7 LEFT KNEE    MASTECTOMY Left 2018    PORTACATH PLACEMENT      TOTAL ELBOW ARTHROPLASTY Left 2022    Procedure: TOTAL ELBOW ARTHROPLASTY FOR FRACTURE,  ULNAR NERVE TRANSPOSITION;  Surgeon: Juan David Sunshine MD;  Location: [facility];  Service: Orthopedics;  Laterality: Left;    TOTAL ELBOW ARTHROPLASTY Left 2025    Procedure: TOTAL ELBOW REVISION, IRRIGATION AND DEBRIDEMENT LEFT;  Surgeon: Juan David Sunshine  MD [place];  Location: [facility];  Service: Orthopedics;  Laterality: Left;    TUBAL ABDOMINAL LIGATION           EVALUATION   PT Ortho       Row Name 06/12/25 1600       Subjective    Subjective Comments Patient reports that she had a lot of pain after dressing change last visit and her arm hasn't stopped hurting.  -KW       Precautions and Contraindications    Precautions LUE lymphedema s/p breast CA  -KW       Subjective Pain    Able to rate subjective pain? yes  -KW    Pre-Treatment Pain Level 0  -KW    Post-Treatment Pain Level 0  -KW       Transfers    Bed-Chair Wilkes (Transfers) standby assist  -KW    Chair-Bed Wilkes (Transfers) standby assist  -KW              User Key  (r) = Recorded By, (t) = Taken By, (c) = Cosigned By      Initials Name Provider Type    Tere Rousseau PT Physical Therapist                     [facility] Wound       Row Name 06/12/25 1600             Wound 05/28/25 0900 Left medial elbow    Wound - Properties Group Placement Date: 05/28/25  - Placement Time: 0900  - Side: Left  - Orientation: medial  - Location: elbow  -    Wound Image Images linked: 1  -KW      Dressing Appearance intact;moist drainage  -KW      Dressing Removed Type --  mepilex ag, optifoam  -KW      Base moist;pink;yellow;subcutaneous;slough;granulating  min new granulation bud formation  -KW      Periwound intact;dry;swelling;maroon/purple  dusky purple periwound  -KW      Periwound Temperature warm  -KW      Periwound Skin Turgor soft  -KW      Edges open  -KW      Wound Length (cm) 1.4 cm  -KW      Wound Width (cm) 1.6 cm  -KW      Wound Depth (cm) 0.1 cm  -KW      Wound Surface Area (cm^2) 1.76 cm^2  -KW      Wound Volume (cm^3) 0.117 cm^3  -KW      Drainage Characteristics/Odor serous;serosanguineous  -KW      Drainage Amount moderate;other (see comments)  min active weeping of serous fluid  -KW      Care, Wound cleansed with;wound cleanser;debrided  -KW      Dressing Care dressing  "applied;multi-layer wrap  liam, mepilex ag, 4\" optifoam, cast padding, comprilan  -KW      Periwound Care dry periwound area maintained;cleansed with pH balanced cleanser  -KW      Retired Wound - Properties Group Placement Date: 05/28/25  - Placement Time: 0900  - Side: Left  - Orientation: medial  -JM Location: elbow  -JM    Retired Wound - Properties Group Placement Date: 05/28/25  - Placement Time: 0900  - Side: Left  - Orientation: medial  - Location: elbow  -JM    Retired Wound - Properties Group Date first assessed: 05/28/25  - Time first assessed: 0900  - Side: Left  - Location: elbow  -              User Key  (r) = Recorded By, (t) = Taken By, (c) = Cosigned By      Initials Name Provider Type    Danitza Campuzano, PT Physical Therapist    KW Tere Allen, PT Physical Therapist                   Lymphedema       Row Name 06/12/25 1600             Lymphedema Edema Assessment    Ptting Edema Category By severity  -KW      Pitting Edema Moderate  -KW         Skin Changes/Observations    Location/Assessment Upper Extremity  -KW      Upper Extremity Conditions left:;intact;dry  -KW      Upper Extremity Color/Pigment left:;normal  -KW         Lymphedema Pulses/Capillary Refill    Lymphedema Pulses/Capillary Refill capillary refill  -KW      Capillary Refill upper extremity capillary refill  -KW      Upper Extremity Capillary Refill left:;less than 3 seconds  -KW         Lymphedema Measurements    Measurement Type(s) Quick Girth  -KW      Quick Girth Areas Upper extremities  -KW         LUE Quick Girth (cm)    Mid upper arm 31.5 cm  -KW      Mid forearm 27 cm  -KW      Wrist crease 27.4 cm  -KW      LUE Quick Girth Total 85.9  -KW         Compression/Skin Care    Compression/Skin Care skin care;wrapping location  -KW      Skin Care washed/dried  -KW      Wrapping Location upper extremity  -KW      Wrapping Location UE left:;hand to axilla  -KW      Wrapping Comments cast " paddding, comprilan with gradient pressure  -KW                User Key  (r) = Recorded By, (t) = Taken By, (c) = Cosigned By      Initials Name Provider Type    Tere Rousseau, VIVI Physical Therapist                    WOUND DEBRIDEMENT  Total area of Debridement: 2cm2  Debridement Site 1  Location- Site 1: L elbow wound  Selective Debridement- Site 1: Wound Surface <20cmsq  Instruments- Site 1: tweezers  Excised Tissue Description- Site 1: minimum, slough  Bleeding- Site 1: none              Therapy Education       Row Name 06/12/25 1600             Therapy Education    Education Details continue dressing changes, trial hold liam. Started compression on L LE  -KW      Given Bandaging/dressing change;Edema management;Symptoms/condition management  -KW      Program Reinforced;Modified  -KW      How Provided Verbal;Demonstration  -KW      Provided to Patient;Caregiver  -KW      Level of Understanding Verbalized;Teach back education performed  -KW                User Key  (r) = Recorded By, (t) = Taken By, (c) = Cosigned By      Initials Name Provider Type    Tere Rousseau PT Physical Therapist                    Recommendation and Plan   PT Assessment/Plan       Row Name 06/12/25 1600          PT Assessment    Functional Limitations Performance in self-care ADL;Other (comment)  wound management limitations  -KW     Impairments Edema;Integumentary integrity;Pain  -KW     Assessment Comments Patient's L medial elbow wound appearing with improved granulation tissue however weeping remains and patient would benefit from compression. Patient agreeable to try compression. Liam held this date to determine if this was cause of patient's increased pain. She would continue to benefit from skilled PT services to improve wound healing.  -KW     Rehab Potential Good  -KW     Patient/caregiver participated in establishment of treatment plan and goals Yes  -KW     Patient would benefit from skilled therapy  intervention Yes  -KW        PT Plan    PT Frequency 2x/week  -KW     Physical Therapy Interventions (Optional Details) wound care;patient/family education  -KW     PT Plan Comments debridement, dressings, MLW  -KW               User Key  (r) = Recorded By, (t) = Taken By, (c) = Cosigned By      Initials Name Provider Type    Tere Rousseau, PT Physical Therapist                    Goals                   Time Calculation: Start Time: 1115  Untimed Charges  Wound Care: 33635 Multilayer comp arm to fingers  55551-Qhjxhshucx comp arm to fingers: 22  71237-Wtvopdvgw debridement: 15  Total Minutes  Untimed Charges Total Minutes: 37   Total Minutes: 37  Therapy Charges for Today       Code Description Service Date Service Provider Modifiers Qty    21854176898 HC QUETA DEBRIDE OPEN WOUND UP TO 20CM 6/12/2025 Tere Allen, PT GP 1    67352670123 HC PT MULTILAYER COMP ARM TO FINGERS 6/12/2025 Tere Allen PT GP 1                    Tere Allen PT  6/12/2025

## 2025-06-18 ENCOUNTER — HOSPITAL ENCOUNTER (OUTPATIENT)
Dept: PHYSICAL THERAPY | Facility: HOSPITAL | Age: 65
Setting detail: THERAPIES SERIES
Discharge: HOME | End: 2025-06-18
Payer: MEDICARE

## 2025-06-18 DIAGNOSIS — S51.002D OPEN WOUND OF LEFT ELBOW, SUBSEQUENT ENCOUNTER: Primary | ICD-10-CM

## 2025-06-18 DIAGNOSIS — I89.0 LYMPHEDEMA OF LEFT UPPER EXTREMITY: ICD-10-CM

## 2025-06-18 PROCEDURE — 97597 DBRDMT OPN WND 1ST 20 CM/<: CPT

## 2025-06-18 NOTE — THERAPY WOUND CARE TREATMENT
Outpatient Rehabilitation - Wound/Debridement Treatment Note   Kim     Patient Name: Britney Raman  : 1960  MRN: [id number]  Today's Date: 2025                 Admit Date: 2025    Visit Dx:    ICD-10-CM ICD-9-CM   1. Open wound of left elbow, subsequent encounter  S51.002D V58.89     881.01   2. 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, initial encounter    Infection of prosthetic shoulder joint        Past Medical History:   Diagnosis Date    Arthritis     Breast cancer 2018    Left - had mastectomy and subsequent chemo and radiation - new diagnosis () of right breast CA and mets to spine    Cataract     MILD    GERD (gastroesophageal reflux disease)     Hx MRSA infection 2018    right arm x 2 and right leg; most recent treatment 2018    Knee pain     LEFT MAINLY    PONV (postoperative nausea and vomiting)     AFTER BREAST SURGERY- HAD WATER AND THREW IT UP IMMEDIATELY    Urinary incontinence in female     UTI (urinary tract infection)     Wears glasses         Past Surgical History:   Procedure Laterality Date    BREAST BIOPSY Bilateral     DIAGNOSTIC LAPAROSCOPY      ENDOSCOPY      EYE SURGERY Bilateral     AS CHILD    INJECTION OF MEDICATION      X7 LEFT KNEE    MASTECTOMY Left 2018    PORTACATH PLACEMENT      TOTAL ELBOW ARTHROPLASTY Left 2022    Procedure: TOTAL ELBOW ARTHROPLASTY FOR FRACTURE,  ULNAR NERVE TRANSPOSITION;  Surgeon: Juan David Sunshine MD;  Location: [facility];  Service: Orthopedics;  Laterality: Left;    TOTAL ELBOW ARTHROPLASTY Left 2025    Procedure: TOTAL ELBOW REVISION, IRRIGATION AND DEBRIDEMENT LEFT;  Surgeon: Juan David Sunshine  MD Artis;  Location: [facility];  Service: Orthopedics;  Laterality: Left;    TUBAL ABDOMINAL LIGATION           EVALUATION   PT Ortho       Row Name 06/18/25 1430       Subjective    Subjective Comments Pt reports pain was much better without the liam.  States she couldn't keep the compressogrip from rolling down, so stopped using it and resumed cast padding with spandage.  Reports her PICC line was removed and she is now on oral abx but having a little nausea after taking it at night.  -       Precautions and Contraindications    Precautions LUE lymphedema s/p breast CA  -       Subjective Pain    Able to rate subjective pain? yes  -    Pre-Treatment Pain Level 0  -    Post-Treatment Pain Level 0  -JM       Transfers    Bed-Chair McCulloch (Transfers) standby assist  -JM    Chair-Bed McCulloch (Transfers) standby assist  -JM    Comment, (Transfers) seated for tx, to/from dept in w/c with family assist  -              User Key  (r) = Recorded By, (t) = Taken By, (c) = Cosigned By      Initials Name Provider Type    Danitza Campuzano, PT Physical Therapist                     [facility] Wound       Row Name 06/18/25 1430             Wound 05/28/25 0900 Left medial elbow    Wound - Properties Group Placement Date: 05/28/25  - Placement Time: 0900  - Side: Left  - Orientation: medial  - Location: elbow  -    Wound Image Images linked: 1  -JM      Dressing Appearance intact;moist drainage  -      Dressing Removed Type silver impregnated;foam;silicone border foam;cotton;elastic bandage  -      Confirmed Empty Wound Bed Yes, visual inspection of wound bed  -      Base moist;pink;yellow;subcutaneous;slough;granulating  min new granulation bud formation  -      Periwound intact;dry;swelling;maroon/purple  dusky purple periwound  -      Periwound Temperature warm  -      Periwound Skin Turgor soft  -      Edges open  -      Wound Length (cm) 1.3 cm  -      Wound Width (cm) 1.2 cm   "-      Wound Depth (cm) 0.1 cm  -      Wound Surface Area (cm^2) 1.23 cm^2  -      Wound Volume (cm^3) 0.082 cm^3  -      Drainage Characteristics/Odor serous;serosanguineous  -      Drainage Amount moderate;other (see comments)  no weeping noted  -      Care, Wound cleansed with;wound cleanser;debrided  -      Dressing Care dressing applied;silver impregnated;foam;silicone border foam  mepilex ag, 4\" optifoam, (cast padding/spandage deferred until tonight for skin break)  -      Periwound Care cleansed with pH balanced cleanser;dry periwound area maintained  -      Retired Wound - Properties Group Placement Date: 05/28/25  - Placement Time: 0900  - Side: Left  - Orientation: medial  - Location: elbow  -    Retired Wound - Properties Group Placement Date: 05/28/25  - Placement Time: 0900  - Side: Left  - Orientation: medial  - Location: elbow  -    Retired Wound - Properties Group Date first assessed: 05/28/25  - Time first assessed: 0900  - Side: Left  - Location: elbow  -              User Key  (r) = Recorded By, (t) = Taken By, (c) = Cosigned By      Initials Name Provider Type    Danitza Campuzano, PT Physical Therapist                      WOUND DEBRIDEMENT  Total area of Debridement: 1cmsq  Debridement Site 1  Location- Site 1: L elbow wound  Selective Debridement- Site 1: Wound Surface <20cmsq  Instruments- Site 1: tweezers  Excised Tissue Description- Site 1: scant, slough  Bleeding- Site 1: none              Therapy Education       Row Name 06/18/25 1430             Therapy Education    Education Details Continue dressings with mepilex ag and optifoam gentle, try to apply compression as tolerated and continue use of cast padding and spandage once you get home today.  -CATHLEEN      Given Bandaging/dressing change;Edema management;Symptoms/condition management  -CATHLEEN      Program Reinforced  -CATHLEEN      How Provided Verbal;Demonstration  -CATHLEEN      Provided to " Patient;Caregiver  -      Level of Understanding Verbalized;Teach back education performed  -                User Key  (r) = Recorded By, (t) = Taken By, (c) = Cosigned By      Initials Name Provider Type    Danitza Campuzano, PT Physical Therapist                    Recommendation and Plan   PT Assessment/Plan       Row Name 06/18/25 1430          PT Assessment    Functional Limitations Performance in self-care ADL;Other (comment)  wound management limitations  -     Impairments Edema;Integumentary integrity;Pain  -     Assessment Comments Small decrease in wound dimensions and no active weeping noted today, but pt reports unable to keep compressogrips in place so resumed cast padding and spandage only.  Pt making slow progress, to be expected with complicated medical situation undergoing cancer tx and h/o infection.  Pt may be able to reduce tx fequency to every other week as family is indpendent with dressing changes.  -     Rehab Potential Good  -     Patient/caregiver participated in establishment of treatment plan and goals Yes  -     Patient would benefit from skilled therapy intervention Yes  -        PT Plan    PT Frequency 1x/week  -     Physical Therapy Interventions (Optional Details) patient/family education;wound care  -     PT Plan Comments debridement, dressings, compression as tolerated  -               User Key  (r) = Recorded By, (t) = Taken By, (c) = Cosigned By      Initials Name Provider Type    Danitza Campuzano, PT Physical Therapist                    Goals   PT OP Goals       Row Name 06/18/25 1541          Time Calculation    PT Goal Re-Cert Due Date 08/26/25  -               User Key  (r) = Recorded By, (t) = Taken By, (c) = Cosigned By      Initials Name Provider Type    Danitza Campuzano, PT Physical Therapist                    PT Goal Re-Cert Due Date: 08/26/25            Time Calculation: Start Time: 1430  Untimed Charges  [id number]  debridement: 20  Total Minutes  Untimed Charges Total Minutes: 20   Total Minutes: 20  Therapy Charges for Today       Code Description Service Date Service Provider Modifiers Qty    [id number]  QUETA DEBRIDE OPEN WOUND UP TO 20CM 6/18/2025 Danitza Palm, PT GP 1                    Danitza Palm, PT  6/18/2025

## 2025-06-26 ENCOUNTER — HOSPITAL ENCOUNTER (OUTPATIENT)
Dept: PHYSICAL THERAPY | Facility: HOSPITAL | Age: 65
Setting detail: THERAPIES SERIES
Discharge: HOME | End: 2025-06-26
Payer: MEDICARE

## 2025-06-26 DIAGNOSIS — S51.002D OPEN WOUND OF LEFT ELBOW, SUBSEQUENT ENCOUNTER: Primary | ICD-10-CM

## 2025-06-26 DIAGNOSIS — I89.0 LYMPHEDEMA OF LEFT UPPER EXTREMITY: ICD-10-CM

## 2025-06-26 PROCEDURE — 97597 DBRDMT OPN WND 1ST 20 CM/<: CPT

## 2025-06-26 NOTE — THERAPY WOUND CARE TREATMENT
Outpatient Rehabilitation - Wound/Debridement Treatment Note   Kim     Patient Name: Britney Raman  : 1960  MRN: [id number]  Today's Date: 2025                 Admit Date: 2025    Visit Dx:    ICD-10-CM ICD-9-CM   1. Open wound of left elbow, subsequent encounter  S51.002D V58.89     881.01   2. 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, initial encounter    Infection of prosthetic shoulder joint        Past Medical History:   Diagnosis Date    Arthritis     Breast cancer 2018    Left - had mastectomy and subsequent chemo and radiation - new diagnosis () of right breast CA and mets to spine    Cataract     MILD    GERD (gastroesophageal reflux disease)     Hx MRSA infection 2018    right arm x 2 and right leg; most recent treatment 2018    Knee pain     LEFT MAINLY    PONV (postoperative nausea and vomiting)     AFTER BREAST SURGERY- HAD WATER AND THREW IT UP IMMEDIATELY    Urinary incontinence in female     UTI (urinary tract infection)     Wears glasses         Past Surgical History:   Procedure Laterality Date    BREAST BIOPSY Bilateral     DIAGNOSTIC LAPAROSCOPY      ENDOSCOPY      EYE SURGERY Bilateral     AS CHILD    INJECTION OF MEDICATION      X7 LEFT KNEE    MASTECTOMY Left 2018    PORTACATH PLACEMENT      TOTAL ELBOW ARTHROPLASTY Left 2022    Procedure: TOTAL ELBOW ARTHROPLASTY FOR FRACTURE,  ULNAR NERVE TRANSPOSITION;  Surgeon: Juan David Sunshine MD;  Location: [facility];  Service: Orthopedics;  Laterality: Left;    TOTAL ELBOW ARTHROPLASTY Left 2025    Procedure: TOTAL ELBOW REVISION, IRRIGATION AND DEBRIDEMENT LEFT;  Surgeon: Juan David Sunshine  MD [place];  Location: [facility];  Service: Orthopedics;  Laterality: Left;    TUBAL ABDOMINAL LIGATION           EVALUATION   PT Ortho       Row Name 06/26/25 1400       Subjective    Subjective Comments Pt cont to have pain with debridement and dressing change, but no other new complaints  -       Precautions and Contraindications    Precautions LUE lymphedema s/p breast CA  -MF       Subjective Pain    Able to rate subjective pain? yes  -MF    Pre-Treatment Pain Level 0  -MF    Post-Treatment Pain Level 0  -MF       Transfers    Bed-Chair [place] (Transfers) standby assist  -MF    Chair-Bed [place] (Transfers) standby assist  -MF    Comment, (Transfers) seated for tx, to/from dept in w/c with family assist  -MF              User Key  (r) = Recorded By, (t) = Taken By, (c) = Cosigned By      Initials Name Provider Type    MF Noah Gupta, PT Physical Therapist                     [facility] Wound       Row Name 06/26/25 1400             Wound 05/28/25 0900 Left medial elbow    Wound - Properties Group Placement Date: 05/28/25  - Placement Time: 0900  - Side: Left  - Orientation: medial  - Location: elbow  -    Wound Image Images linked: 1  -MF      Dressing Appearance intact;moist drainage  -MF      Dressing Removed Type silver impregnated  -      Confirmed Empty Wound Bed Yes, visual inspection of wound bed  -MF      Base moist;pink;yellow;subcutaneous;slough;granulating  -      Periwound intact;dry;swelling;maroon/purple  -      Periwound Temperature warm  -      Periwound Skin Turgor soft  -MF      Edges open  -MF      Wound Length (cm) 0.9 cm  -MF      Wound Width (cm) 1.1 cm  -MF      Wound Depth (cm) 0.1 cm  -MF      Wound Surface Area (cm^2) 0.78 cm^2  -MF      Wound Volume (cm^3) 0.052 cm^3  -MF      Drainage Characteristics/Odor serous  -MF      Drainage Amount small  -MF      Care, Wound irrigated with;wound cleanser;debrided  -MF      Dressing Care foam;low-adherent;silver  impregnated  mepilex Ag foam with optifoam and spandage  -      Periwound Care dry periwound area maintained;cleansed with pH balanced cleanser  -      Retired Wound - Properties Group Placement Date: 05/28/25  - Placement Time: 0900 -JM Side: Left  - Orientation: medial  -JM Location: elbow  -JM    Retired Wound - Properties Group Placement Date: 05/28/25  - Placement Time: 0900  - Side: Left  - Orientation: medial  - Location: elbow  -JM    Retired Wound - Properties Group Date first assessed: 05/28/25  - Time first assessed: 0900  - Side: Left  - Location: elbow  -              User Key  (r) = Recorded By, (t) = Taken By, (c) = Cosigned By      Initials Name Provider Type    Noah Hightower, PT Physical Therapist    Danitza Campuzano, PT Physical Therapist                      WOUND DEBRIDEMENT  Total area of Debridement: ~1cm2  Debridement Site 1  Location- Site 1: L elbow wound  Selective Debridement- Site 1: Wound Surface <20cmsq  Instruments- Site 1: tweezers  Excised Tissue Description- Site 1: scant, slough  Bleeding- Site 1: none              Therapy Education       Row Name 06/26/25 1400             Therapy Education    Education Details Pt to cont with dressing changes every 2-3 days.  -MF      Given Bandaging/dressing change;Edema management;Symptoms/condition management  -MF      Program Reinforced  -MF      How Provided Verbal;Demonstration  -MF      Provided to Patient;Caregiver  -MF      Level of Understanding Verbalized;Teach back education performed  -MF                User Key  (r) = Recorded By, (t) = Taken By, (c) = Cosigned By      Initials Name Provider Type    Noah Hightower, PT Physical Therapist                    Recommendation and Plan   PT Assessment/Plan       Row Name 06/26/25 1400          PT Assessment    Functional Limitations Performance in self-care ADL;Other (comment)  -MF     Impairments Edema;Integumentary integrity;Pain  -MF      Assessment Comments Wound showing good overall decrease in wound size today with all STGs met and 2/3 LTGs met to this point. Pt making good progress with silver foam and dressing changes every 2-3 days. Pt to decrease freq to every other week .  -     Rehab Potential Excellent  -     Patient/caregiver participated in establishment of treatment plan and goals Yes  -     Patient would benefit from skilled therapy intervention Yes  -        PT Plan    PT Frequency Other (comment)  every other week  -     Predicted Duration of Therapy Intervention (PT) 6 visits  -     Planned CPT's? PT SELF CARE/MGMT/TRAIN 15 MIN: 59305;PT QUETA DEBRIDE OPEN WOUND UP TO 20 CM: [id number]  -     Physical Therapy Interventions (Optional Details) wound care;patient/family education  -     PT Plan Comments debridement, dressings, compression as tolerated  -               User Key  (r) = Recorded By, (t) = Taken By, (c) = Cosigned By      Initials Name Provider Type    MF Noah Gupta, PT Physical Therapist                    Goals   PT OP Goals       Row Name 06/26/25 1400          PT Short Term Goals    STG 1 Patient/family to verbalize S&S of infection and when to seek medical attention.  -     STG 1 Progress Met  -     STG 2 Reduce wound drainage to minimal amount to faciliate wound closure.  -     STG 2 Progress Met  -     STG 3 Reduce L elbow wound dimensions by 50% as evidence of wound healing.  -     STG 3 Progress Met  -        Long Term Goals    LTG Date to Achieve 08/26/25  -     LTG 1 Patient/spouse to be independent with home dressing changes.  -     LTG 1 Progress Met  -     LTG 2 Reduce L elbow wound dimensions by 90% as evidence of wound healing.  -     LTG 2 Progress Ongoing;Progressing  -     LTG 3 Patient to report less than 3/10 pain in L elbow wound to faciliate improved LUE function.  -     LTG 3 Progress Met  -        Time Calculation    PT Goal Re-Cert Due Date 08/26/25  -                User Key  (r) = Recorded By, (t) = Taken By, (c) = Cosigned By      Initials Name Provider Type    Noah Hightower, PT Physical Therapist                    PT Goal Re-Cert Due Date: 08/26/25  PT Short Term Goals  STG 1: Patient/family to verbalize S&S of infection and when to seek medical attention.  STG 1 Progress: Met  STG 2: Reduce wound drainage to minimal amount to faciliate wound closure.  STG 2 Progress: Met  STG 3: Reduce L elbow wound dimensions by 50% as evidence of wound healing.  STG 3 Progress: Met  Long Term Goals  LTG Date to Achieve: 08/26/25  LTG 1: Patient/spouse to be independent with home dressing changes.  LTG 1 Progress: Met  LTG 2: Reduce L elbow wound dimensions by 90% as evidence of wound healing.  LTG 2 Progress: Ongoing, Progressing  LTG 3: Patient to report less than 3/10 pain in L elbow wound to faciliate improved LUE function.  LTG 3 Progress: Met      Time Calculation: Start Time: 1400  Untimed Charges  [id number] debridement: 20  Total Minutes  Untimed Charges Total Minutes: 20   Total Minutes: 20  Therapy Charges for Today       Code Description Service Date Service Provider Modifiers Qty    14599556096 HC QUETA DEBRIDE OPEN WOUND UP TO 20CM 6/26/2025 Noah Gupta, PT GP 1                    Noah Gupta, PT  6/26/2025

## 2025-06-26 NOTE — THERAPY PROGRESS REPORT/RE-CERT
Outpatient Rehabilitation - Wound/Debridement Progress Note   Kim     Patient Name: Britney Raman  : 1960  MRN: [id number]  Today's Date: 2025                 Admit Date: 2025    Visit Dx:    ICD-10-CM ICD-9-CM   1. Open wound of left elbow, subsequent encounter  S51.002D V58.89     881.01   2. 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, initial encounter    Infection of prosthetic shoulder joint        Past Medical History:   Diagnosis Date    Arthritis     Breast cancer 2018    Left - had mastectomy and subsequent chemo and radiation - new diagnosis () of right breast CA and mets to spine    Cataract     MILD    GERD (gastroesophageal reflux disease)     Hx MRSA infection 2018    right arm x 2 and right leg; most recent treatment 2018    Knee pain     LEFT MAINLY    PONV (postoperative nausea and vomiting)     AFTER BREAST SURGERY- HAD WATER AND THREW IT UP IMMEDIATELY    Urinary incontinence in female     UTI (urinary tract infection)     Wears glasses         Past Surgical History:   Procedure Laterality Date    BREAST BIOPSY Bilateral     DIAGNOSTIC LAPAROSCOPY      ENDOSCOPY      EYE SURGERY Bilateral     AS CHILD    INJECTION OF MEDICATION      X7 LEFT KNEE    MASTECTOMY Left 2018    PORTACATH PLACEMENT      TOTAL ELBOW ARTHROPLASTY Left 2022    Procedure: TOTAL ELBOW ARTHROPLASTY FOR FRACTURE,  ULNAR NERVE TRANSPOSITION;  Surgeon: Juan David Sunshine MD;  Location: [facility];  Service: Orthopedics;  Laterality: Left;    TOTAL ELBOW ARTHROPLASTY Left 2025    Procedure: TOTAL ELBOW REVISION, IRRIGATION AND DEBRIDEMENT LEFT;  Surgeon: Juan David Sunshine,  MD;  [facility];  Service: Orthopedics;  Laterality: Left;    TUBAL ABDOMINAL LIGATION           EVALUATION   PT Ortho       Row Name 06/26/25 1400       Subjective    Subjective Comments Pt cont to have pain with debridement and dressing change, but no other new complaints  -       Precautions and Contraindications    Precautions LUE lymphedema s/p breast CA  -MF       Subjective Pain    Able to rate subjective pain? yes  -MF    Pre-Treatment Pain Level 0  -MF    Post-Treatment Pain Level 0  -MF       Transfers    Bed-Chair [place] (Transfers) standby assist  -MF    Chair-Bed [place] (Transfers) standby assist  -MF    Comment, (Transfers) seated for tx, to/from dept in w/c with family assist  -MF              User Key  (r) = Recorded By, (t) = Taken By, (c) = Cosigned By      Initials Name Provider Type    MF Noah Gupta, PT Physical Therapist                     [facility] Wound       Row Name 06/26/25 1400             Wound 05/28/25 0900 Left medial elbow    Wound - Properties Group Placement Date: 05/28/25  - Placement Time: 0900  - Side: Left  - Orientation: medial  - Location: elbow  -    Wound Image Images linked: 1  -MF      Dressing Appearance intact;moist drainage  -MF      Dressing Removed Type silver impregnated  -      Confirmed Empty Wound Bed Yes, visual inspection of wound bed  -      Base moist;pink;yellow;subcutaneous;slough;granulating  -      Periwound intact;dry;swelling;maroon/purple  -      Periwound Temperature warm  -      Periwound Skin Turgor soft  -MF      Edges open  -MF      Wound Length (cm) 0.9 cm  -MF      Wound Width (cm) 1.1 cm  -MF      Wound Depth (cm) 0.1 cm  -MF      Wound Surface Area (cm^2) 0.78 cm^2  -MF      Wound Volume (cm^3) 0.052 cm^3  -MF      Drainage Characteristics/Odor serous  -MF      Drainage Amount small  -MF      Care, Wound irrigated with;wound cleanser;debrided  -MF      Dressing Care foam;low-adherent;silver  impregnated  mepilex Ag foam with optifoam and spandage  -      Periwound Care dry periwound area maintained;cleansed with pH balanced cleanser  -      Retired Wound - Properties Group Placement Date: 05/28/25  - Placement Time: 0900 -JM Side: Left  - Orientation: medial  -JM Location: elbow  -JM    Retired Wound - Properties Group Placement Date: 05/28/25  - Placement Time: 0900  - Side: Left  - Orientation: medial  - Location: elbow  -JM    Retired Wound - Properties Group Date first assessed: 05/28/25  - Time first assessed: 0900  - Side: Left  - Location: elbow  -              User Key  (r) = Recorded By, (t) = Taken By, (c) = Cosigned By      Initials Name Provider Type    Noah Hightower, PT Physical Therapist    Danitza Campuzano, PT Physical Therapist                      WOUND DEBRIDEMENT  Total area of Debridement: ~1cm2  Debridement Site 1  Location- Site 1: L elbow wound  Selective Debridement- Site 1: Wound Surface <20cmsq  Instruments- Site 1: tweezers  Excised Tissue Description- Site 1: scant, slough  Bleeding- Site 1: none              Therapy Education       Row Name 06/26/25 1400             Therapy Education    Education Details Pt to cont with dressing changes every 2-3 days.  -MF      Given Bandaging/dressing change;Edema management;Symptoms/condition management  -MF      Program Reinforced  -MF      How Provided Verbal;Demonstration  -MF      Provided to Patient;Caregiver  -MF      Level of Understanding Verbalized;Teach back education performed  -MF                User Key  (r) = Recorded By, (t) = Taken By, (c) = Cosigned By      Initials Name Provider Type    Noah Hightower, PT Physical Therapist                    Recommendation and Plan   PT Assessment/Plan       Row Name 06/26/25 1400          PT Assessment    Functional Limitations Performance in self-care ADL;Other (comment)  -MF     Impairments Edema;Integumentary integrity;Pain  -MF      Assessment Comments Wound showing good overall decrease in wound size today with all STGs met and 2/3 LTGs met to this point. Pt making good progress with silver foam and dressing changes every 2-3 days. Pt to decrease freq to every other week .  -     Rehab Potential Excellent  -     Patient/caregiver participated in establishment of treatment plan and goals Yes  -     Patient would benefit from skilled therapy intervention Yes  -        PT Plan    PT Frequency Other (comment)  every other week  -     Predicted Duration of Therapy Intervention (PT) 6 visits  -     Planned CPT's? PT SELF CARE/MGMT/TRAIN 15 MIN: 60079;PT QUETA DEBRIDE OPEN WOUND UP TO 20 CM: 83857  -     Physical Therapy Interventions (Optional Details) wound care;patient/family education  -     PT Plan Comments debridement, dressings, compression as tolerated  -               User Key  (r) = Recorded By, (t) = Taken By, (c) = Cosigned By      Initials Name Provider Type    MF Noah Gupta, PT Physical Therapist                    Goals   PT OP Goals       Row Name 06/26/25 1400          PT Short Term Goals    STG 1 Patient/family to verbalize S&S of infection and when to seek medical attention.  -     STG 1 Progress Met  -     STG 2 Reduce wound drainage to minimal amount to faciliate wound closure.  -     STG 2 Progress Met  -     STG 3 Reduce L elbow wound dimensions by 50% as evidence of wound healing.  -     STG 3 Progress Met  -        Long Term Goals    LTG Date to Achieve 08/26/25  -     LTG 1 Patient/spouse to be independent with home dressing changes.  -     LTG 1 Progress Met  -     LTG 2 Reduce L elbow wound dimensions by 90% as evidence of wound healing.  -     LTG 2 Progress Ongoing;Progressing  -     LTG 3 Patient to report less than 3/10 pain in L elbow wound to faciliate improved LUE function.  -     LTG 3 Progress Met  -        Time Calculation    PT Goal Re-Cert Due Date 08/26/25  -                User Key  (r) = Recorded By, (t) = Taken By, (c) = Cosigned By      Initials Name Provider Type    Noah Hightower, PT Physical Therapist                    PT Goal Re-Cert Due Date: 08/26/25  PT Short Term Goals  STG 1: Patient/family to verbalize S&S of infection and when to seek medical attention.  STG 1 Progress: Met  STG 2: Reduce wound drainage to minimal amount to faciliate wound closure.  STG 2 Progress: Met  STG 3: Reduce L elbow wound dimensions by 50% as evidence of wound healing.  STG 3 Progress: Met  Long Term Goals  LTG Date to Achieve: 08/26/25  LTG 1: Patient/spouse to be independent with home dressing changes.  LTG 1 Progress: Met  LTG 2: Reduce L elbow wound dimensions by 90% as evidence of wound healing.  LTG 2 Progress: Ongoing, Progressing  LTG 3: Patient to report less than 3/10 pain in L elbow wound to faciliate improved LUE function.  LTG 3 Progress: Met      Time Calculation: Start Time: 1400  Untimed Charges  17401-Wcccolhlm debridement: 20  Total Minutes  Untimed Charges Total Minutes: 20   Total Minutes: 20  Therapy Charges for Today       Code Description Service Date Service Provider Modifiers Qty    34796848615 HC QUETA DEBRIDE OPEN WOUND UP TO 20CM 6/26/2025 Noah Gupta, PT GP 1                    Noah Gupta, PT  6/26/2025

## 2025-06-30 ENCOUNTER — HOSPITAL ENCOUNTER (OUTPATIENT)
Dept: CT IMAGING | Age: 65
Discharge: HOME | End: 2025-06-30
Attending: INTERNAL MEDICINE
Payer: COMMERCIAL

## 2025-06-30 DIAGNOSIS — C79.51 METASTASIS TO BONE (HCC): ICD-10-CM

## 2025-06-30 DIAGNOSIS — C50.012 MALIGNANT NEOPLASM OF NIPPLE OF LEFT BREAST IN FEMALE, UNSPECIFIED ESTROGEN RECEPTOR STATUS (HCC): ICD-10-CM

## 2025-06-30 PROCEDURE — 74176 CT ABD & PELVIS W/O CONTRAST: CPT

## 2025-07-10 ENCOUNTER — TRANSCRIBE ORDERS (OUTPATIENT)
Dept: LAB | Facility: HOSPITAL | Age: 65
End: 2025-07-10
Payer: MEDICARE

## 2025-07-10 ENCOUNTER — LAB (OUTPATIENT)
Dept: LAB | Facility: HOSPITAL | Age: 65
End: 2025-07-10
Payer: COMMERCIAL

## 2025-07-10 ENCOUNTER — HOSPITAL ENCOUNTER (OUTPATIENT)
Dept: PHYSICAL THERAPY | Facility: HOSPITAL | Age: 65
Setting detail: THERAPIES SERIES
Discharge: HOME | End: 2025-07-10
Payer: COMMERCIAL

## 2025-07-10 DIAGNOSIS — Z96.622 PRESENCE OF LEFT ARTIFICIAL ELBOW JOINT: ICD-10-CM

## 2025-07-10 DIAGNOSIS — S51.002D OPEN WOUND OF LEFT ELBOW, SUBSEQUENT ENCOUNTER: Primary | ICD-10-CM

## 2025-07-10 DIAGNOSIS — Z96.659 INFECTED PROSTHETIC KNEE JOINT, SUBSEQUENT ENCOUNTER: ICD-10-CM

## 2025-07-10 DIAGNOSIS — Z96.622 PRESENCE OF LEFT ARTIFICIAL ELBOW JOINT: Primary | ICD-10-CM

## 2025-07-10 DIAGNOSIS — T84.59XD INFECTED PROSTHETIC KNEE JOINT, SUBSEQUENT ENCOUNTER: ICD-10-CM

## 2025-07-10 DIAGNOSIS — I89.0 LYMPHEDEMA OF LEFT UPPER EXTREMITY: ICD-10-CM

## 2025-07-10 DIAGNOSIS — Z86.14 PERSONAL HISTORY OF METHICILLIN RESISTANT STAPHYLOCOCCUS AUREUS: ICD-10-CM

## 2025-07-10 DIAGNOSIS — L03.114 CELLULITIS OF LEFT HAND EXCLUDING FINGERS AND THUMB: ICD-10-CM

## 2025-07-10 LAB
ALBUMIN SERPL-MCNC: 3.6 G/DL (ref 3.5–5.2)
ALBUMIN/GLOB SERPL: 1.2 G/DL
ALP SERPL-CCNC: 60 U/L (ref 39–117)
ALT SERPL W P-5'-P-CCNC: 21 U/L (ref 1–33)
ANION GAP SERPL CALCULATED.3IONS-SCNC: 11.4 MMOL/L (ref 5–15)
AST SERPL-CCNC: 28 U/L (ref 1–32)
BASOPHILS # BLD AUTO: 0.03 10*3/MM3 (ref 0–0.2)
BASOPHILS NFR BLD AUTO: 0.6 % (ref 0–1.5)
BILIRUB SERPL-MCNC: 0.3 MG/DL (ref 0–1.2)
BUN SERPL-MCNC: 17.8 MG/DL (ref 8–23)
BUN/CREAT SERPL: 9.2 (ref 7–25)
CALCIUM SPEC-SCNC: 8.8 MG/DL (ref 8.6–10.5)
CHLORIDE SERPL-SCNC: 98 MMOL/L (ref 98–107)
CO2 SERPL-SCNC: 23.6 MMOL/L (ref 22–29)
CREAT SERPL-MCNC: 1.93 MG/DL (ref 0.57–1)
CRP SERPL-MCNC: 6.02 MG/DL (ref 0–0.5)
DEPRECATED RDW RBC AUTO: 51.5 FL (ref 37–54)
EGFRCR SERPLBLD CKD-EPI 2021: 28.8 ML/MIN/1.73
EOSINOPHIL # BLD AUTO: 0.2 10*3/MM3 (ref 0–0.4)
EOSINOPHIL NFR BLD AUTO: 3.9 % (ref 0.3–6.2)
ERYTHROCYTE [DISTWIDTH] IN BLOOD BY AUTOMATED COUNT: 19.3 % (ref 12.3–15.4)
ERYTHROCYTE [SEDIMENTATION RATE] IN BLOOD: 76 MM/HR (ref 0–30)
GLOBULIN UR ELPH-MCNC: 3 GM/DL
GLUCOSE SERPL-MCNC: 89 MG/DL (ref 65–99)
HCT VFR BLD AUTO: 33.7 % (ref 34–46.6)
HGB BLD-MCNC: 9.9 G/DL (ref 12–15.9)
IMM GRANULOCYTES # BLD AUTO: 0.05 10*3/MM3 (ref 0–0.05)
IMM GRANULOCYTES NFR BLD AUTO: 1 % (ref 0–0.5)
LYMPHOCYTES # BLD AUTO: 0.75 10*3/MM3 (ref 0.7–3.1)
LYMPHOCYTES NFR BLD AUTO: 14.8 % (ref 19.6–45.3)
MCH RBC QN AUTO: 21.8 PG (ref 26.6–33)
MCHC RBC AUTO-ENTMCNC: 29.4 G/DL (ref 31.5–35.7)
MCV RBC AUTO: 74.1 FL (ref 79–97)
MONOCYTES # BLD AUTO: 0.44 10*3/MM3 (ref 0.1–0.9)
MONOCYTES NFR BLD AUTO: 8.7 % (ref 5–12)
NEUTROPHILS NFR BLD AUTO: 3.61 10*3/MM3 (ref 1.7–7)
NEUTROPHILS NFR BLD AUTO: 71 % (ref 42.7–76)
NRBC BLD AUTO-RTO: 0 /100 WBC (ref 0–0.2)
PLATELET # BLD AUTO: 162 10*3/MM3 (ref 140–450)
PMV BLD AUTO: 9 FL (ref 6–12)
POTASSIUM SERPL-SCNC: 4 MMOL/L (ref 3.5–5.2)
PROT SERPL-MCNC: 6.6 G/DL (ref 6–8.5)
RBC # BLD AUTO: 4.55 10*6/MM3 (ref 3.77–5.28)
SODIUM SERPL-SCNC: 133 MMOL/L (ref 136–145)
WBC NRBC COR # BLD AUTO: 5.08 10*3/MM3 (ref 3.4–10.8)

## 2025-07-10 PROCEDURE — 97597 DBRDMT OPN WND 1ST 20 CM/<: CPT

## 2025-07-10 PROCEDURE — 80053 COMPREHEN METABOLIC PANEL: CPT

## 2025-07-10 PROCEDURE — 29584 APPL MLTLAY CMPRN SYS UP ARM: CPT

## 2025-07-10 PROCEDURE — 85025 COMPLETE CBC W/AUTO DIFF WBC: CPT

## 2025-07-10 PROCEDURE — 85652 RBC SED RATE AUTOMATED: CPT

## 2025-07-10 PROCEDURE — 36415 COLL VENOUS BLD VENIPUNCTURE: CPT

## 2025-07-10 PROCEDURE — 86140 C-REACTIVE PROTEIN: CPT

## 2025-07-10 NOTE — THERAPY TREATMENT NOTE
Outpatient Rehabilitation - Wound/Debridement Treatment Note   Kim     Patient Name: Britney Raman  : 1960  MRN: [id number]  Today's Date: 7/10/2025                           Admit Date: 7/10/2025    Visit Dx:    ICD-10-CM ICD-9-CM   1. Open wound of left elbow, subsequent encounter  S51.002D V58.89     881.01   2. 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, initial encounter    Infection of prosthetic shoulder joint        Past Medical History:   Diagnosis Date    Arthritis     Breast cancer 2018    Left - had mastectomy and subsequent chemo and radiation - new diagnosis () of right breast CA and mets to spine    Cataract     MILD    GERD (gastroesophageal reflux disease)     Hx MRSA infection 2018    right arm x 2 and right leg; most recent treatment 2018    Knee pain     LEFT MAINLY    PONV (postoperative nausea and vomiting)     AFTER BREAST SURGERY- HAD WATER AND THREW IT UP IMMEDIATELY    Urinary incontinence in female     UTI (urinary tract infection)     Wears glasses         Past Surgical History:   Procedure Laterality Date    BREAST BIOPSY Bilateral     DIAGNOSTIC LAPAROSCOPY      ENDOSCOPY      EYE SURGERY Bilateral     AS CHILD    INJECTION OF MEDICATION      X7 LEFT KNEE    MASTECTOMY Left 2018    PORTACATH PLACEMENT      TOTAL ELBOW ARTHROPLASTY Left 2022    Procedure: TOTAL ELBOW ARTHROPLASTY FOR FRACTURE,  ULNAR NERVE TRANSPOSITION;  Surgeon: Juan David Sunshine MD;  Location: [facility];  Service: Orthopedics;  Laterality: Left;    TOTAL ELBOW ARTHROPLASTY Left 2025    Procedure: TOTAL ELBOW REVISION, IRRIGATION AND DEBRIDEMENT LEFT;  Surgeon: Jong  Juan David Wisdom MD;  [facility];  Service: Orthopedics;  Laterality: Left;    TUBAL ABDOMINAL LIGATION           EVALUATION   PT Ortho       Row Name 07/10/25 1700       Subjective    Subjective Comments no complaints  -KW       Precautions and Contraindications    Precautions LUE lymphedema s/p breast CA  -KW       Subjective Pain    Able to rate subjective pain? yes  -KW    Pre-Treatment Pain Level 0  -KW    Post-Treatment Pain Level 0  -KW       Transfers    Bed-Chair [place] (Transfers) standby assist  -KW    Chair-Bed [place] (Transfers) standby assist  -KW              User Key  (r) = Recorded By, (t) = Taken By, (c) = Cosigned By      Initials Name Provider Type    KW Tere Allen, VIVI Physical Therapist                     [facility] Wound       Row Name 07/10/25 1700             Wound 05/28/25 0900 Left medial elbow    Wound - Properties Group Placement Date: 05/28/25  - Placement Time: 0900 - Side: Left  - Orientation: medial  - Location: elbow  -    Wound Image Images linked: 3  -KW      Dressing Appearance intact;moist drainage  -KW      Dressing Removed Type --  mepilex ag  -KW      Confirmed Empty Wound Bed Yes, visual inspection of wound bed  -KW      Base moist;pink;yellow;subcutaneous;slough;granulating  -KW      Periwound intact;dry;swelling;maroon/purple  -KW      Periwound Temperature warm  -KW      Periwound Skin Turgor soft  -KW      Edges open  -KW      Wound Length (cm) 0.5 cm  -KW      Wound Width (cm) 0.4 cm  -KW      Wound Depth (cm) 0.2 cm  -KW      Wound Surface Area (cm^2) 0.16 cm^2  -KW      Wound Volume (cm^3) 0.021 cm^3  -KW      Drainage Characteristics/Odor serous  -KW      Drainage Amount small  -KW      Care, Wound cleansed with;wound cleanser;debrided  -KW      Dressing Care --  HFBc, mepilex ag, kerlix, coban  -KW      Periwound Care cleansed with pH balanced cleanser  -KW      Retired Wound - Properties Group Placement Date: 05/28/25  - Placement  Time: 0900 -JM Side: Left  - Orientation: medial  -JM Location: elbow  -JM    Retired Wound - Properties Group Placement Date: 05/28/25  - Placement Time: 0900 -JM Side: Left  -JM Orientation: medial  -JM Location: elbow  -JM    Retired Wound - Properties Group Date first assessed: 05/28/25  - Time first assessed: 0900 - Side: Left  - Location: elbow  -JM              User Key  (r) = Recorded By, (t) = Taken By, (c) = Cosigned By      Initials Name Provider Type    Danitza Campuzano, PT Physical Therapist    KW Tere Allen, VIVI Physical Therapist                   Lymphedema       Row Name 07/10/25 1700             Lymphedema Edema Assessment    Ptting Edema Category By severity  -KW      Pitting Edema Moderate  -KW         Skin Changes/Observations    Location/Assessment Upper Extremity  -KW      Upper Extremity Conditions left:;intact;dry  -KW      Upper Extremity Color/Pigment left:;normal  -KW         Lymphedema Pulses/Capillary Refill    Lymphedema Pulses/Capillary Refill capillary refill  -KW      Capillary Refill upper extremity capillary refill  -KW      Upper Extremity Capillary Refill left:;less than 3 seconds  -KW         Lymphedema Measurements    Measurement Type(s) Quick Girth  -KW      Quick Girth Areas Upper extremities  -KW         Compression/Skin Care    Compression/Skin Care skin care;wrapping location  -KW      Skin Care washed/dried  -KW      Wrapping Location upper extremity  -KW      Wrapping Location UE left:;hand to axilla  -KW      Wrapping Comments cast padding with coban with gradient compression  -KW                User Key  (r) = Recorded By, (t) = Taken By, (c) = Cosigned By      Initials Name Provider Type    Tere Rousseau, VIVI Physical Therapist                    WOUND DEBRIDEMENT  Total area of Debridement: 2cm2  Debridement Site 1  Location- Site 1: L elbow wound  Selective Debridement- Site 1: Wound Surface <20cmsq  Instruments- Site 1:  jovanyezers  Excised Tissue Description- Site 1: minimum, slough  Bleeding- Site 1: none              Therapy Education       Row Name 07/10/25 1800             Therapy Education    Education Details cotninue POC, trial HFBc  -KW      Given Bandaging/dressing change;Edema management;Symptoms/condition management  -KW      Program Reinforced  -KW      How Provided Verbal;Demonstration  -KW      Provided to Patient;Caregiver  -KW      Level of Understanding Verbalized;Teach back education performed  -KW                User Key  (r) = Recorded By, (t) = Taken By, (c) = Cosigned By      Initials Name Provider Type    Tere Rousseau, PT Physical Therapist                    Recommendation and Plan   PT Assessment/Plan       Row Name 07/10/25 1700          PT Assessment    Functional Limitations Performance in self-care ADL;Other (comment)  -KW     Impairments Edema;Integumentary integrity;Pain  -KW     Assessment Comments Patient continues to improve with wound healing, wound smaller in size. Wound does present with thickened slough and very wet wound bed. PT added HFBc this date to help reduce bioburden and thickend slough. Derek also trialed coban for added compression as L UE remains with edema. she would continue to benefit from skilled PT servcies  -KW     Rehab Potential Good  -KW     Patient/caregiver participated in establishment of treatment plan and goals Yes  -KW     Patient would benefit from skilled therapy intervention Yes  -KW        PT Plan    Physical Therapy Interventions (Optional Details) patient/family education;wound care  -KW     PT Plan Comments debridement, dressings, compression as tolerated  -KW               User Key  (r) = Recorded By, (t) = Taken By, (c) = Cosigned By      Initials Name Provider Type    Tere Rousseau, PT Physical Therapist                    Goals                   Time Calculation: Start Time: 1345  Untimed Charges  51342-Vjadktyndr comp arm to fingers:  18  95610-Cvzdfpvlq debridement: 10  Total Minutes  Untimed Charges Total Minutes: 28   Total Minutes: 28  Therapy Charges for Today       Code Description Service Date Service Provider Modifiers Qty    54338051421 HC QUETA DEBRIDE OPEN WOUND UP TO 20CM 7/10/2025 Tere Allen, PT GP 1    05864351670 HC PT MULTILAYER COMP ARM TO FINGERS 7/10/2025 Tere Allen, PT GP 1                    Tere Allen PT  7/10/2025

## 2025-07-14 ENCOUNTER — APPOINTMENT (OUTPATIENT)
Dept: GENERAL RADIOLOGY | Facility: HOSPITAL | Age: 65
End: 2025-07-14
Payer: COMMERCIAL

## 2025-07-14 ENCOUNTER — APPOINTMENT (OUTPATIENT)
Dept: CT IMAGING | Facility: HOSPITAL | Age: 65
End: 2025-07-14
Payer: COMMERCIAL

## 2025-07-14 ENCOUNTER — HOSPITAL ENCOUNTER (EMERGENCY)
Facility: HOSPITAL | Age: 65
Discharge: HOME | End: 2025-07-14
Attending: STUDENT IN AN ORGANIZED HEALTH CARE EDUCATION/TRAINING PROGRAM | Admitting: STUDENT IN AN ORGANIZED HEALTH CARE EDUCATION/TRAINING PROGRAM
Payer: COMMERCIAL

## 2025-07-14 VITALS
HEIGHT: 67 IN | OXYGEN SATURATION: 91 % | HEART RATE: 81 BPM | SYSTOLIC BLOOD PRESSURE: 97 MMHG | RESPIRATION RATE: 16 BRPM | BODY MASS INDEX: 19.93 KG/M2 | TEMPERATURE: 99.8 F | DIASTOLIC BLOOD PRESSURE: 48 MMHG | WEIGHT: 127 LBS

## 2025-07-14 DIAGNOSIS — Z85.3 HISTORY OF BREAST CANCER: ICD-10-CM

## 2025-07-14 DIAGNOSIS — D64.9 ANEMIA, UNSPECIFIED TYPE: ICD-10-CM

## 2025-07-14 DIAGNOSIS — S59.902A INJURY OF LEFT ELBOW, INITIAL ENCOUNTER: Primary | ICD-10-CM

## 2025-07-14 DIAGNOSIS — Z96.622: ICD-10-CM

## 2025-07-14 LAB
ALBUMIN SERPL-MCNC: 3.5 G/DL (ref 3.5–5.2)
ALBUMIN/GLOB SERPL: 1.3 G/DL
ALP SERPL-CCNC: 63 U/L (ref 39–117)
ALT SERPL W P-5'-P-CCNC: 10 U/L (ref 1–33)
ANION GAP SERPL CALCULATED.3IONS-SCNC: 10 MMOL/L (ref 5–15)
AST SERPL-CCNC: 26 U/L (ref 1–32)
BASOPHILS # BLD AUTO: 0.02 10*3/MM3 (ref 0–0.2)
BASOPHILS NFR BLD AUTO: 0.4 % (ref 0–1.5)
BILIRUB SERPL-MCNC: 0.4 MG/DL (ref 0–1.2)
BUN SERPL-MCNC: 17.2 MG/DL (ref 8–23)
BUN/CREAT SERPL: 8.8 (ref 7–25)
CALCIUM SPEC-SCNC: 8.5 MG/DL (ref 8.6–10.5)
CHLORIDE SERPL-SCNC: 96 MMOL/L (ref 98–107)
CO2 SERPL-SCNC: 26 MMOL/L (ref 22–29)
CREAT SERPL-MCNC: 1.96 MG/DL (ref 0.57–1)
CRP SERPL-MCNC: 12.45 MG/DL (ref 0–0.5)
DEPRECATED RDW RBC AUTO: 53 FL (ref 37–54)
EGFRCR SERPLBLD CKD-EPI 2021: 28.3 ML/MIN/1.73
EOSINOPHIL # BLD AUTO: 0.16 10*3/MM3 (ref 0–0.4)
EOSINOPHIL NFR BLD AUTO: 3 % (ref 0.3–6.2)
ERYTHROCYTE [DISTWIDTH] IN BLOOD BY AUTOMATED COUNT: 19.5 % (ref 12.3–15.4)
ERYTHROCYTE [SEDIMENTATION RATE] IN BLOOD: 54 MM/HR (ref 0–30)
GLOBULIN UR ELPH-MCNC: 2.8 GM/DL
GLUCOSE SERPL-MCNC: 91 MG/DL (ref 65–99)
HCT VFR BLD AUTO: 28.2 % (ref 34–46.6)
HGB BLD-MCNC: 8.3 G/DL (ref 12–15.9)
IMM GRANULOCYTES # BLD AUTO: 0.05 10*3/MM3 (ref 0–0.05)
IMM GRANULOCYTES NFR BLD AUTO: 0.9 % (ref 0–0.5)
LYMPHOCYTES # BLD AUTO: 0.58 10*3/MM3 (ref 0.7–3.1)
LYMPHOCYTES NFR BLD AUTO: 10.9 % (ref 19.6–45.3)
MCH RBC QN AUTO: 21.8 PG (ref 26.6–33)
MCHC RBC AUTO-ENTMCNC: 29.4 G/DL (ref 31.5–35.7)
MCV RBC AUTO: 74 FL (ref 79–97)
MONOCYTES # BLD AUTO: 0.7 10*3/MM3 (ref 0.1–0.9)
MONOCYTES NFR BLD AUTO: 13.2 % (ref 5–12)
NEUTROPHILS NFR BLD AUTO: 3.81 10*3/MM3 (ref 1.7–7)
NEUTROPHILS NFR BLD AUTO: 71.6 % (ref 42.7–76)
NRBC BLD AUTO-RTO: 0 /100 WBC (ref 0–0.2)
PLATELET # BLD AUTO: 134 10*3/MM3 (ref 140–450)
PMV BLD AUTO: 9.3 FL (ref 6–12)
POTASSIUM SERPL-SCNC: 3.8 MMOL/L (ref 3.5–5.2)
PROCALCITONIN SERPL-MCNC: 0.07 NG/ML (ref 0–0.25)
PROT SERPL-MCNC: 6.3 G/DL (ref 6–8.5)
RBC # BLD AUTO: 3.81 10*6/MM3 (ref 3.77–5.28)
SODIUM SERPL-SCNC: 132 MMOL/L (ref 136–145)
WBC NRBC COR # BLD AUTO: 5.32 10*3/MM3 (ref 3.4–10.8)

## 2025-07-14 PROCEDURE — 25810000003 SODIUM CHLORIDE 0.9 % SOLUTION: Performed by: NURSE PRACTITIONER

## 2025-07-14 PROCEDURE — 85025 COMPLETE CBC W/AUTO DIFF WBC: CPT | Performed by: NURSE PRACTITIONER

## 2025-07-14 PROCEDURE — 85652 RBC SED RATE AUTOMATED: CPT | Performed by: NURSE PRACTITIONER

## 2025-07-14 PROCEDURE — 84145 PROCALCITONIN (PCT): CPT | Performed by: NURSE PRACTITIONER

## 2025-07-14 PROCEDURE — 80053 COMPREHEN METABOLIC PANEL: CPT | Performed by: NURSE PRACTITIONER

## 2025-07-14 PROCEDURE — 86140 C-REACTIVE PROTEIN: CPT | Performed by: NURSE PRACTITIONER

## 2025-07-14 PROCEDURE — 87040 BLOOD CULTURE FOR BACTERIA: CPT | Performed by: NURSE PRACTITIONER

## 2025-07-14 PROCEDURE — 73080 X-RAY EXAM OF ELBOW: CPT

## 2025-07-14 PROCEDURE — 36415 COLL VENOUS BLD VENIPUNCTURE: CPT

## 2025-07-14 PROCEDURE — 73200 CT UPPER EXTREMITY W/O DYE: CPT

## 2025-07-14 PROCEDURE — 99284 EMERGENCY DEPT VISIT MOD MDM: CPT

## 2025-07-14 RX ORDER — HYDROCODONE BITARTRATE AND ACETAMINOPHEN 5; 325 MG/1; MG/1
1 TABLET ORAL ONCE
Refills: 0 | Status: DISCONTINUED | OUTPATIENT
Start: 2025-07-14 | End: 2025-07-14 | Stop reason: HOSPADM

## 2025-07-14 RX ORDER — SODIUM CHLORIDE 0.9 % (FLUSH) 0.9 %
10 SYRINGE (ML) INJECTION AS NEEDED
Status: DISCONTINUED | OUTPATIENT
Start: 2025-07-14 | End: 2025-07-14 | Stop reason: HOSPADM

## 2025-07-14 RX ORDER — ONDANSETRON 4 MG/1
4 TABLET, ORALLY DISINTEGRATING ORAL ONCE
Status: DISCONTINUED | OUTPATIENT
Start: 2025-07-14 | End: 2025-07-14 | Stop reason: HOSPADM

## 2025-07-14 RX ADMIN — SODIUM CHLORIDE 500 ML: 9 INJECTION, SOLUTION INTRAVENOUS at 13:53

## 2025-07-14 NOTE — ED PROVIDER NOTES
EMERGENCY DEPARTMENT ENCOUNTER    Pt Name: Britney Raman  MRN: [id number]  Pt :   1960  Room Number:    Date of encounter:  2025  PCP: Fatou Vera MD  ED Provider: DANIELLE Soriano    Historian: Patient, spouse      HPI:  Chief Complaint: Left elbow        Context: Britney Raman is a 64 y.o. female who presents to the ED c/o worsening of left elbow pain.  She went to get into the bed last night, fell on the top of the bed striking her elbow and then on the floor hitting the back.  Reports no head injury, no loss of consciousness, no preceding chest pain or dizziness.  She underwent left total elbow replacement 3 years ago, 2025 she was hospitalized due to septic arthritis of left elbow and underwent washout revision left total elbow arthroplasty/.  She was followed with [facility] [facility] Dr. Drake, he received IV antibiotics for 6 weeks, transition to oral antibiotics, was not able to tolerate doxycycline, was prescribed minocycline, first dose today.      PAST MEDICAL HISTORY  Past Medical History:   Diagnosis Date    Arthritis     Breast cancer 2018    Left - had mastectomy and subsequent chemo and radiation - new diagnosis () of right breast CA and mets to spine    Cataract     MILD    GERD (gastroesophageal reflux disease)     Hx MRSA infection 2018    right arm x 2 and right leg; most recent treatment 2018    Knee pain     LEFT MAINLY    PONV (postoperative nausea and vomiting)     AFTER BREAST SURGERY- HAD WATER AND THREW IT UP IMMEDIATELY    Urinary incontinence in female     UTI (urinary tract infection)     Wears glasses          PAST SURGICAL HISTORY  Past Surgical History:   Procedure Laterality Date    BREAST BIOPSY Bilateral     DIAGNOSTIC LAPAROSCOPY      ENDOSCOPY      EYE SURGERY Bilateral     AS CHILD    INJECTION OF MEDICATION      X7 LEFT KNEE    MASTECTOMY Left 2018    PORTACATH PLACEMENT      TOTAL ELBOW ARTHROPLASTY Left 2022     Procedure: TOTAL ELBOW ARTHROPLASTY FOR FRACTURE,  ULNAR NERVE TRANSPOSITION;  Surgeon: Juan David Sunshine MD;  Location:  [facility];  Service: Orthopedics;  Laterality: Left;    TOTAL ELBOW ARTHROPLASTY Left 4/28/2025    Procedure: TOTAL ELBOW REVISION, IRRIGATION AND DEBRIDEMENT LEFT;  Surgeon: Juan David Sunshine MD;  Location:  [facility];  Service: Orthopedics;  Laterality: Left;    TUBAL ABDOMINAL LIGATION           FAMILY HISTORY  Family History   Problem Relation Age of Onset    Heart disease Mother     Heart disease Father          SOCIAL HISTORY  Social History     Socioeconomic History    Marital status:    Tobacco Use    Smoking status: Former     Current packs/day: 0.50     Average packs/day: 0.5 packs/day for 5.0 years (2.5 ttl pk-yrs)     Types: Cigarettes    Smokeless tobacco: Never    Tobacco comments:     QUIT AGE 19   Vaping Use    Vaping status: Never Used   Substance and Sexual Activity    Alcohol use: Never    Drug use: Never         ALLERGIES  Clindamycin, Sulfa antibiotics, and Bactrim ds [sulfamethoxazole-trimethoprim]        REVIEW OF SYSTEMS  Review of Systems       All systems reviewed and negative except for those discussed in HPI.       PHYSICAL EXAM    I have reviewed the triage vital signs and nursing notes.    ED Triage Vitals [07/14/25 0938]   Temp Heart Rate Resp BP SpO2   99.8 °F (37.7 °C) 77 16 105/67 95 %      Temp src Heart Rate Source Patient Position BP Location FiO2 (%)   Oral Monitor Sitting Right arm --       Physical Exam  GENERAL:   Appears in no acute distress.   HENT: Nares patent.  EYES: No scleral icterus.  Exotropia left eye   CV: Regular rhythm, regular rate.  RESPIRATORY: Normal effort.  No audible wheezes, rales or rhonchi.  ABDOMEN: Soft, nontender  MUSCULOSKELETAL: No deformities.  Edema, slight erythema to the left elbow, decreased range of motion, good distal pulses, healing wound to the left medial posterior elbow, dressing present dry clean  intact  NEURO: Alert, moves all extremities, follows commands.  SKIN: Warm, dry, no rash visualized.      LAB RESULTS  Recent Results (from the past 24 hours)   Comprehensive Metabolic Panel    Collection Time: 07/14/25  1:50 PM    Specimen: Blood   Result Value Ref Range    Glucose 91 65 - 99 mg/dL    BUN 17.2 8.0 - 23.0 mg/dL    Creatinine 1.96 (H) 0.57 - 1.00 mg/dL    Sodium 132 (L) 136 - 145 mmol/L    Potassium 3.8 3.5 - 5.2 mmol/L    Chloride 96 (L) 98 - 107 mmol/L    CO2 26.0 22.0 - 29.0 mmol/L    Calcium 8.5 (L) 8.6 - 10.5 mg/dL    Total Protein 6.3 6.0 - 8.5 g/dL    Albumin 3.5 3.5 - 5.2 g/dL    ALT (SGPT) 10 1 - 33 U/L    AST (SGOT) 26 1 - 32 U/L    Alkaline Phosphatase 63 39 - 117 U/L    Total Bilirubin 0.4 0.0 - 1.2 mg/dL    Globulin 2.8 gm/dL    A/G Ratio 1.3 g/dL    BUN/Creatinine Ratio 8.8 7.0 - 25.0    Anion Gap 10.0 5.0 - 15.0 mmol/L    eGFR 28.3 (L) >60.0 mL/min/1.73   C-reactive Protein    Collection Time: 07/14/25  1:50 PM    Specimen: Blood   Result Value Ref Range    C-Reactive Protein 12.45 (H) 0.00 - 0.50 mg/dL   Procalcitonin    Collection Time: 07/14/25  1:50 PM    Specimen: Blood   Result Value Ref Range    Procalcitonin 0.07 0.00 - 0.25 ng/mL   Sedimentation Rate    Collection Time: 07/14/25  2:58 PM    Specimen: Blood   Result Value Ref Range    Sed Rate 54 (H) 0 - 30 mm/hr   CBC Auto Differential    Collection Time: 07/14/25  2:58 PM    Specimen: Blood   Result Value Ref Range    WBC 5.32 3.40 - 10.80 10*3/mm3    RBC 3.81 3.77 - 5.28 10*6/mm3    Hemoglobin 8.3 (L) 12.0 - 15.9 g/dL    Hematocrit 28.2 (L) 34.0 - 46.6 %    MCV 74.0 (L) 79.0 - 97.0 fL    MCH 21.8 (L) 26.6 - 33.0 pg    MCHC 29.4 (L) 31.5 - 35.7 g/dL    RDW 19.5 (H) 12.3 - 15.4 %    RDW-SD 53.0 37.0 - 54.0 fl    MPV 9.3 6.0 - 12.0 fL    Platelets 134 (L) 140 - 450 10*3/mm3    Neutrophil % 71.6 42.7 - 76.0 %    Lymphocyte % 10.9 (L) 19.6 - 45.3 %    Monocyte % 13.2 (H) 5.0 - 12.0 %    Eosinophil % 3.0 0.3 - 6.2 %    Basophil %  0.4 0.0 - 1.5 %    Immature Grans % 0.9 (H) 0.0 - 0.5 %    Neutrophils, Absolute 3.81 1.70 - 7.00 10*3/mm3    Lymphocytes, Absolute 0.58 (L) 0.70 - 3.10 10*3/mm3    Monocytes, Absolute 0.70 0.10 - 0.90 10*3/mm3    Eosinophils, Absolute 0.16 0.00 - 0.40 10*3/mm3    Basophils, Absolute 0.02 0.00 - 0.20 10*3/mm3    Immature Grans, Absolute 0.05 0.00 - 0.05 10*3/mm3    nRBC 0.0 0.0 - 0.2 /100 WBC       If labs were ordered, I independently reviewed the results and considered them in treating the patient.        RADIOLOGY  CT Upper Extremity Left Without Contrast  Result Date: 7/14/2025  CT UPPER EXTREMITY LEFT WO CONTRAST Date of Exam: 7/14/2025 11:49 AM EDT Indication: elbow.  History of total left elbow arthroplasty, fall last night, worsening of pain trauma - possible prox humerus fx. revision performed 4/28/2025 Comparison: Radiographs 7/14/2025 Technique: Axial CT images were obtained of the left upper extremity without contrast administration.  Reconstructed coronal and sagittal images were also obtained. Automated exposure control and iterative construction methods were used. Findings: There is a longstem arthroplasty of the elbow. No periprosthetic fracture seen. No periprosthetic fracture is seen. There is thickened periosteum along the distal humerus. There is some heterotopic ossification about the elbow. There is underlying osteopenia. Soft tissues not well evaluated due to metal streak artifact. There is soft tissue edema of the arm most pronounced at the elbow     Impression: No periprosthetic fracture identified on this exam. There is a elbow arthroplasty with periosteal reaction and increased density along the distal humeral component. This could be seen with osteomyelitis or post traumatic changes. Diffuse soft tissue edema. If concern for infection recommend correlation with inflammatory markers. Aspiration analysis of joint fluid may be necessary if concern for a septic joint. Electronically Signed:  Noa Yepez MD  7/14/2025 12:37 PM EDT  Workstation ID: [id number]    XR Elbow 3+ View Left  Result Date: 7/14/2025  XR ELBOW 3+ VW LEFT Date of Exam: 7/14/2025 10:13 AM EDT Indication: FALL Comparison: 4/28/2025 Findings: Postoperative changes of elbow arthroplasty. There appears to be lucency along the humeral component. Similar irregularity along the distal lateral greater than medial humerus. No definite evidence of periprosthetic fracture.     Impression: 1.Postoperative changes of elbow arthroplasty. There appears to be lucency along the humeral component which may reflect loosening. 2.Similar irregularity along the distal lateral greater than medial humerus. No definite evidence of periprosthetic fracture. Electronically Signed: Rah Escobar MD  7/14/2025 10:57 AM EDT  Workstation ID: [id number]      I ordered and independently reviewed the above noted radiographic studies.      I viewed images of x-ray of the elbow which showed postoperative changes per my independent interpretation.    See radiologist's dictation for official interpretation.        PROCEDURES    Procedures    No orders to display       MEDICATIONS GIVEN IN ER    Medications   sodium chloride 0.9 % flush 10 mL (has no administration in time range)   HYDROcodone-acetaminophen (NORCO) 5-325 MG per tablet 1 tablet (1 tablet Oral Not Given 7/14/25 1324)   ondansetron ODT (ZOFRAN-ODT) disintegrating tablet 4 mg (4 mg Oral Not Given 7/14/25 1349)   sodium chloride 0.9 % bolus 500 mL (0 mL Intravenous Stopped 7/14/25 1457)         MEDICAL DECISION MAKING, PROGRESS, and CONSULTS    All labs, if obtained, have been independently reviewed by me.  All radiology studies, if obtained, have been reviewed by me and the radiologist dictating the report.  All EKG's, if obtained, have been independently viewed and interpreted by me/my attending physician.      Discussion below represents my analysis of pertinent findings related to patient's condition,  differential diagnosis, treatment plan and final disposition.  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  Patient was trying to get in the bed, fell forwards striking the elbow.  On physical exam she was nontoxic-appearing, chronically ill-appearing, decreased range of motion, edema slight erythema  noted to left elbow joint.  Wound present posterior elbow without drainage, streaking redness or erythema.  X-ray of the left elbow showed a lucency along the humeral component which may reflect loosening, followed with CT of the arm showing no periprosthetic fracture, increased density along the distal humeral component osteomyelitis versus traumatic changes, diffuse soft tissue edema.  Lab work significant for normal white count 5.32, normal procalcitonin 0.07, elevated CRP 2.45, sed rate 54, anemia with hemoglobin 8.3 hematocrit 28.7, renal insufficiency, creatinine 1.96.  Discussed patient with Dr. Sunshine, advised sling and follow-up in the office this week.  Furthermore Dr. Gamez told me that they are dealing with chronic infection that they are trying to suppress only at this time.  Patient received fluids, placed in existing sling, discharged home in stable condition, advised to continue follow-up with infectious diseases and orthopedist, return precautions for any worsening symptoms were given                     Differential diagnosis:    Periprostatic fracture, elbow effusion, contusion, septic joint, osteomyelitis      Additional sources:    - Discussed/ obtained information from independent historians: Spouse    - External (non-ED) record review: 4/28/2025, OP note, revision left total elbow arthroplasty due to infection associated with internal joint prosthesis    - Chronic or social conditions impacting care: Metastatic breast  cancer, infected joint    - Shared decision making: Patient, spouse      Orders placed during this visit:  Orders Placed This Encounter   Procedures    Blood Culture - Blood,    Blood Culture - Blood,    XR Elbow 3+ View Left    CT Upper Extremity Left Without Contrast    Comprehensive Metabolic Panel    Sedimentation Rate    C-reactive Protein    Procalcitonin    CBC Auto Differential    Vital Signs Recheck    Insert Peripheral IV    CBC & Differential         Additional orders considered but not ordered:      ED Course:    Consultants:      ED Course as of 07/14/25 1659   Mon Jul 14, 2025   1125 Paged orthopedics [IR]   1127 Spoke with Dr. Sunshine, advised sling and outpatient follow-up this week.  Agreeable with CT of upper extremity [IR]   1300 Your CT upper extremity showed periosteal reaction and increased density along the distal humerus component osteomyelitis versus posttraumatic changes.  Lab work inflammatory markers ordered [IR]   1441 Creatinine(!): 1.96  Chronic and slightly worsening renal sufficient [IR]   1441 Sodium(!): 132 [IR]   1441 Chloride(!): 96 [IR]   1441 C-Reactive Protein(!): 12.45  Worsening [IR]   1550 Paged Dr. Sunshine [IR]   1626 Spoke with Dr. Sunshine, advised of blood work and imaging results.  He is  very well aware of this patient who dealing with chronic infection; at this point they are trying to suppress but do not eradicate infect, patient will not undergo another surgery due to stage IV breast cancer, patient follows with infectious disease.   [IR]      ED Course User Index  [IR] Ena Field, DANIELLE              Shared Decision Making:  After my consideration of clinical presentation and any laboratory/radiology studies obtained, I discussed the findings with the patient/patient representative who is in agreement with the treatment plan and the final disposition.   Risks and benefits of discharge and/or observation/admission were discussed.       AS OF 16:59 EDT VITALS:    BP -  97/48  HR - 81  TEMP - 99.8 °F (37.7 °C) (Oral)  O2 SATS - 91%                  DIAGNOSIS  Final diagnoses:   Injury of left elbow, initial encounter   History of arthroplasty of left elbow   Anemia, unspecified type   History of breast cancer         DISPOSITION  DISCHARGE    Patient discharged in stable condition.    Reviewed implications of results, diagnosis, meds, responsibility to follow up, warning signs and symptoms of possible worsening, potential complications and reasons to return to ER.    Patient/Family voiced understanding of above instructions.    Discussed plan for discharge, as there is no emergent indication for admission.  Pt/family is agreeable and understands need for follow up and possible repeat testing.  Pt/family is aware that discharge does not mean that nothing is wrong but that it indicates no emergency is currently present that requires admission and they must continue care with follow-up as given below or with a physician of their choice.     FOLLOW-UP  Juan David Sunshine MD  [address]  [address]  [place] [address]  [phone]    Schedule an appointment as soon as possible for a visit       [facility] EMERGENCY DEPARTMENT  [address] [address] [phone]    If symptoms worsen         Medication List      No changes were made to your prescriptions during this visit.            Please note that portions of this document were completed with voice recognition software.     DANIELLE Soriano   07/14/25   16:59 EDT        Ena Field APRZARA  07/14/25 1650       Ena Field APRN  07/14/25 1651

## 2025-07-19 LAB
BACTERIA SPEC AEROBE CULT: NORMAL
BACTERIA SPEC AEROBE CULT: NORMAL

## 2025-07-28 ENCOUNTER — HOSPITAL ENCOUNTER (OUTPATIENT)
Dept: PHYSICAL THERAPY | Facility: HOSPITAL | Age: 65
Setting detail: THERAPIES SERIES
Discharge: HOME | End: 2025-07-28
Payer: COMMERCIAL

## 2025-07-28 DIAGNOSIS — S51.002D OPEN WOUND OF LEFT ELBOW, SUBSEQUENT ENCOUNTER: Primary | ICD-10-CM

## 2025-07-28 DIAGNOSIS — I89.0 LYMPHEDEMA OF LEFT UPPER EXTREMITY: ICD-10-CM

## 2025-07-28 NOTE — THERAPY DISCHARGE NOTE
Outpatient Rehabilitation - Wound/Debridement Discharge Summary   [place]     Patient Name: Britney Raman  : 1960  MRN: [id number]  Today's Date: 2025              NO CHARGES ASSOCIATED WITH THIS VISIT, D/C ONLY    Admit Date: 2025    Visit Dx:    ICD-10-CM ICD-9-CM   1. Open wound of left elbow, subsequent encounter  S51.002D V58.89     881.01   2. 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, initial encounter    Infection of prosthetic shoulder joint        Past Medical History:   Diagnosis Date    Arthritis     Breast cancer 2018    Left - had mastectomy and subsequent chemo and radiation - new diagnosis () of right breast CA and mets to spine    Cataract     MILD    GERD (gastroesophageal reflux disease)     Hx MRSA infection 2018    right arm x 2 and right leg; most recent treatment 2018    Knee pain     LEFT MAINLY    PONV (postoperative nausea and vomiting)     AFTER BREAST SURGERY- HAD WATER AND THREW IT UP IMMEDIATELY    Urinary incontinence in female     UTI (urinary tract infection)     Wears glasses         Past Surgical History:   Procedure Laterality Date    BREAST BIOPSY Bilateral     DIAGNOSTIC LAPAROSCOPY      ENDOSCOPY      EYE SURGERY Bilateral     AS CHILD    INJECTION OF MEDICATION      X7 LEFT KNEE    MASTECTOMY Left 2018    PORTACATH PLACEMENT      TOTAL ELBOW ARTHROPLASTY Left 2022    Procedure: TOTAL ELBOW ARTHROPLASTY FOR FRACTURE,  ULNAR NERVE TRANSPOSITION;  Surgeon: Juan David Sunshine MD;  [facility];  Service: Orthopedics;  Laterality: Left;    TOTAL ELBOW ARTHROPLASTY Left 2025    Procedure: TOTAL ELBOW REVISION, IRRIGATION  AND DEBRIDEMENT LEFT;  Surgeon: Juan David Sunshine MD;  Location: [facility];  Service: Orthopedics;  Laterality: Left;    TUBAL ABDOMINAL LIGATION           EVALUATION   PT Ortho       Row Name 07/28/25 1500       Subjective    Subjective Comments Pt states no drainage from wound in about a week.  -       Precautions and Contraindications    Precautions LUE lymphedema s/p breast CA  -JM       Subjective Pain    Able to rate subjective pain? yes  -JM    Pre-Treatment Pain Level 0  -JM    Post-Treatment Pain Level 0  -JM       Transfers    Bed-Chair [place] (Transfers) standby assist  -JM    Chair-Bed [place] (Transfers) standby assist  -JM    Comment, (Transfers) seated for tx, to/from dept in w/c with family assist  -JM              User Key  (r) = Recorded By, (t) = Taken By, (c) = Cosigned By      Initials Name Provider Type    Danitza Campuzano PT Physical Therapist                         LDA Wound       Row Name 07/28/25 1500             Wound 05/28/25 0900 Left medial elbow    Wound - Properties Group Placement Date: 05/28/25  - Placement Time: 0900  - Side: Left  - Orientation: medial  - Location: elbow  -    Wound Image Images linked: 1  -JM      Dressing Appearance dry;intact;no drainage  -      Dressing Removed Type foam;silver impregnated;silicone border foam  -      Confirmed Empty Wound Bed Yes, visual inspection of wound bed  -      Base closed/resurfaced;dry;epithelialization;pink  -      Periwound intact;dry;swelling  -      Wound Length (cm) 0 cm  -      Wound Width (cm) 0 cm  -      Wound Depth (cm) 0 cm  -      Wound Surface Area (cm^2) 0 cm^2  -JM      Wound Volume (cm^3) 0 cm^3  -JM      Drainage Amount none  -      Dressing Care open to air  -      Retired Wound - Properties Group Placement Date: 05/28/25  - Placement Time: 0900  - Side: Left  - Orientation: medial  - Location: elbow  -    Retired Wound - Properties Group Placement  Date: 05/28/25  - Placement Time: 0900  -JM Side: Left  -JM Orientation: medial  -JM Location: elbow  -JM    Retired Wound - Properties Group Date first assessed: 05/28/25  - Time first assessed: 0900  -JM Side: Left  -JM Location: elbow  -JM              User Key  (r) = Recorded By, (t) = Taken By, (c) = Cosigned By      Initials Name Provider Type    Danitza Campuzano PT Physical Therapist                      WOUND DEBRIDEMENT                   Therapy Education  Education Details: OK to leave [facility], will be d/c'd from wound care, have provider send new referral if any further tx needed  Given: Bandaging/dressing change, Edema management, Symptoms/condition management  Program: Reinforced  How Provided: Verbal, Demonstration  Provided to: Patient, Caregiver  Level of Understanding: Verbalized, Teach back education performed     Therapy Education       Row Name 07/28/25 1500             Therapy Education    Education Details OK to leave [facility], will be d/c'd from wound care, have provider send new referral if any further tx needed  -JM      Given Bandaging/dressing change;Edema management;Symptoms/condition management  -      Program Reinforced  -      How Provided Verbal;Demonstration  -JM      Provided to Patient;Caregiver  -      Level of Understanding Verbalized;Teach back education performed  -JM                User Key  (r) = Recorded By, (t) = Taken By, (c) = Cosigned By      Initials Name Provider Type    Danitza Campuzano PT Physical Therapist                    Recommendation and Plan   PT Assessment/Plan       Row Name 07/28/25 1500          PT Assessment    Functional Limitations Performance in self-care ADL;Other (comment)  -JM     Impairments Edema;Integumentary integrity;Pain  -     Assessment Comments Pt's wound fully closed and no skilled PT indicated, no tx required today, only d/c.  Pt and family verbalizing understanding of monitoring area for breakdown and when to contact  provider.  -        PT Plan    PT Plan Comments d/c  -               User Key  (r) = Recorded By, (t) = Taken By, (c) = Cosigned By      Initials Name Provider Type    Danitza Campuzano, PT Physical Therapist                    Goals   PT OP Goals       Row Name 07/28/25 1513 07/28/25 1500       PT Short Term Goals    STG 1 -- Patient/family to verbalize S&S of infection and when to seek medical attention.  -    STG 1 Progress -- Met  -    STG 2 -- Reduce wound drainage to minimal amount to faciliate wound closure.  -    STG 2 Progress -- Met  -    STG 3 -- Reduce L elbow wound dimensions by 50% as evidence of wound healing.  -    STG 3 Progress -- Met  -       Long Term Goals    LTG Date to Achieve -- 08/26/25  -    LTG 1 -- Patient/spouse to be independent with home dressing changes.  -    LTG 1 Progress -- Met  -    LTG 2 -- Reduce L elbow wound dimensions by 90% as evidence of wound healing.  -    LTG 2 Progress -- Met  -    LTG 3 -- Patient to report less than 3/10 pain in L elbow wound to faciliate improved LUE function.  -    LTG 3 Progress -- Met  -       Time Calculation    PT Goal Re-Cert Due Date 08/26/25  - --              User Key  (r) = Recorded By, (t) = Taken By, (c) = Cosigned By      Initials Name Provider Type    Danitza Campuzano, PT Physical Therapist                    Time Calculation: Start Time: 1500             OP Discharge Summary       Row Name 07/28/25 1514             OP PT Discharge Summary    Date of Discharge 07/28/25  -      Reason for Discharge All goals achieved  -      Outcomes Achieved Able to achieve all goals within established timeline  -      Discharge Destination Home without follow-up  -                User Key  (r) = Recorded By, (t) = Taken By, (c) = Cosigned By      Initials Name Provider Type    Danitza Campuzano, PT Physical Therapist                    Danitza Palm, PT  7/28/2025

## 2025-08-12 ENCOUNTER — HOSPITAL ENCOUNTER (EMERGENCY)
Facility: HOSPITAL | Age: 65
Discharge: HOME | End: 2025-08-12
Attending: EMERGENCY MEDICINE | Admitting: EMERGENCY MEDICINE
Payer: MEDICARE

## 2025-08-12 ENCOUNTER — APPOINTMENT (OUTPATIENT)
Dept: CARDIOLOGY | Facility: HOSPITAL | Age: 65
End: 2025-08-12
Payer: MEDICARE

## 2025-08-12 VITALS
RESPIRATION RATE: 16 BRPM | DIASTOLIC BLOOD PRESSURE: 51 MMHG | HEART RATE: 72 BPM | WEIGHT: 132.06 LBS | TEMPERATURE: 98.8 F | HEIGHT: 67 IN | SYSTOLIC BLOOD PRESSURE: 93 MMHG | OXYGEN SATURATION: 97 % | BODY MASS INDEX: 20.73 KG/M2

## 2025-08-12 DIAGNOSIS — M79.89 LEFT ARM SWELLING: Primary | ICD-10-CM

## 2025-08-12 LAB
BH CV UPPER VENOUS LEFT AXILLARY AUGMENT: NORMAL
BH CV UPPER VENOUS LEFT AXILLARY COMPRESS: NORMAL
BH CV UPPER VENOUS LEFT AXILLARY PHASIC: NORMAL
BH CV UPPER VENOUS LEFT AXILLARY SPONT: NORMAL
BH CV UPPER VENOUS LEFT BASILIC FOREARM COMPRESS: NORMAL
BH CV UPPER VENOUS LEFT BASILIC UPPER COMPRESS: NORMAL
BH CV UPPER VENOUS LEFT BRACHIAL COMPRESS: NORMAL
BH CV UPPER VENOUS LEFT CEPHALIC FOREARM COMPRESS: NORMAL
BH CV UPPER VENOUS LEFT CEPHALIC UPPER COMPRESS: NORMAL
BH CV UPPER VENOUS LEFT INTERNAL JUGULAR AUGMENT: NORMAL
BH CV UPPER VENOUS LEFT INTERNAL JUGULAR COMPRESS: NORMAL
BH CV UPPER VENOUS LEFT INTERNAL JUGULAR PHASIC: NORMAL
BH CV UPPER VENOUS LEFT INTERNAL JUGULAR SPONT: NORMAL
BH CV UPPER VENOUS LEFT RADIAL COMPRESS: NORMAL
BH CV UPPER VENOUS LEFT SUBCLAVIAN AUGMENT: NORMAL
BH CV UPPER VENOUS LEFT SUBCLAVIAN COMPRESS: NORMAL
BH CV UPPER VENOUS LEFT SUBCLAVIAN PHASIC: NORMAL
BH CV UPPER VENOUS LEFT SUBCLAVIAN SPONT: NORMAL
BH CV UPPER VENOUS LEFT ULNAR COMPRESS: NORMAL
BH CV VAS PRELIMINARY FINDINGS SCRIPTING: 1

## 2025-08-12 PROCEDURE — 93971 EXTREMITY STUDY: CPT

## 2025-08-12 PROCEDURE — 99284 EMERGENCY DEPT VISIT MOD MDM: CPT

## 2025-08-12 PROCEDURE — 93971 EXTREMITY STUDY: CPT | Performed by: INTERNAL MEDICINE

## (undated) DEVICE — TBG PENCL TELESCP MEGADYNE SMOKE EVAC 10FT

## (undated) DEVICE — GLV SURG SENSICARE PI ORTHO SZ7.5 LF STRL

## (undated) DEVICE — DRAPE,SHOULDER,BEACH CHAIR,STERILE: Brand: MEDLINE

## (undated) DEVICE — BLANKT WARM LOWR/BDY 100X120CM

## (undated) DEVICE — PAD ARMBRD SURG CONVOL 7.5X20X2IN

## (undated) DEVICE — SKN PREP SPNG STKS PVP PNT STR: Brand: MEDLINE INDUSTRIES, INC.

## (undated) DEVICE — 5.0MM ROUND SOLID CARBIDE BUR EXTRA LONG

## (undated) DEVICE — 3M™ DURAPORE™ SURGICAL TAPE 1538-3, 3 INCH X 10 YARD (7,5CM X 9,1M), 4 ROLLS/BOX: Brand: 3M™ DURAPORE™

## (undated) DEVICE — ELECTRD BLD EZ CLN STD 6.5IN

## (undated) DEVICE — DRSNG WND GZ CURAD OIL EMULSION 3X8IN STRL PK/3

## (undated) DEVICE — ANTIBACTERIAL UNDYED BRAIDED (POLYGLACTIN 910), SYNTHETIC ABSORBABLE SUTURE: Brand: COATED VICRYL

## (undated) DEVICE — BANDAGE,ELASTIC,ESMARK,STERILE,4"X9',LF: Brand: MEDLINE

## (undated) DEVICE — DRSNG PAD ABD 8X10IN STRL

## (undated) DEVICE — PATIENT RETURN ELECTRODE, SINGLE-USE, CONTACT QUALITY MONITORING, ADULT, WITH 9FT CORD, FOR PATIENTS WEIGING OVER 33LBS. (15KG): Brand: MEGADYNE

## (undated) DEVICE — CONTAINER,SPECIMEN,OR STERILE,4OZ: Brand: MEDLINE

## (undated) DEVICE — INTENDED TO AID IN THE PASSING OF SUTURES THROUGH BONE AND SOFT TISSUE DURING ORTHOPEDIC SURGERY: Brand: HOFFEE SUTURE RETRIEVER

## (undated) DEVICE — CEMENT MIXING SYSTEM WITH MIS FEMORAL BREAKAWAY NOZZLE: Brand: REVOLUTION

## (undated) DEVICE — DISPOSABLE TOURNIQUET CUFF SINGLE BLADDER, DUAL PORT AND QUICK CONNECT CONNECTOR: Brand: COLOR CUFF

## (undated) DEVICE — DISH PETRI 3.5IN MD STRL LF

## (undated) DEVICE — SUT ETHLN 2/0 PS 18IN 585H

## (undated) DEVICE — TAPE,CLOTH/SILK,CURAD,3"X10YD,LF,40/CS: Brand: CURAD

## (undated) DEVICE — TRAP FLD MINIVAC MEGADYNE 100ML

## (undated) DEVICE — GOWN,NON-REINFORCED,SIRUS,SET IN SLV,XL: Brand: MEDLINE

## (undated) DEVICE — THIN FLEXIBLE NOZZLE: Brand: REVOLUTION

## (undated) DEVICE — INTENT TO BE USED WITH SUTURE MATERIAL FOR TISSUE CLOSURE: Brand: RICHARD-ALLAN® NEEDLE STRAIGHT CUTTING

## (undated) DEVICE — KT PUMP INFUBLOCK MDL 2100 PMKITSOLIS

## (undated) DEVICE — SPNG GZ WOVN 4X4IN 12PLY 10/BX STRL

## (undated) DEVICE — STCKNT IMPERV 12IN STRL

## (undated) DEVICE — PK MAJ SHLDR SPLT 10

## (undated) DEVICE — GLV SURG SIGNATURE TOUCH PF LTX 8 STRL BX/50

## (undated) DEVICE — PENCL SMOKE/EVAC MEGADYNE TELESCP 10FT

## (undated) DEVICE — PAD,ARMBOARD,CONV,FOAM,2X8X20",12PR/CS: Brand: MEDLINE

## (undated) DEVICE — Device

## (undated) DEVICE — PRECISION (12.0 X 0.51 X 34.5MM)

## (undated) DEVICE — BNDG ESMARK 4IN 9FT LF STRL BLU

## (undated) DEVICE — PUMP PAIN AUTOFUSER AUTO SELCT NOBOLUS 1TO14ML/HR 550ML DISP

## (undated) DEVICE — UNDERGLV SURG BIOGEL INDICAT PI SZ8 BLU

## (undated) DEVICE — STOCKINETTE,IMPERVIOUS,12X48,STERILE: Brand: MEDLINE

## (undated) DEVICE — SST TWIST DRILL, STANDARD, 2MM DIA. X 127MM: Brand: MICROAIRE®

## (undated) DEVICE — GOWN,SIRUS,NONRNF,SETINSLV,XL,20/CS: Brand: MEDLINE

## (undated) DEVICE — UNDERCAST PADDING: Brand: DEROYAL

## (undated) DEVICE — STERILE PVP: Brand: MEDLINE INDUSTRIES, INC.

## (undated) DEVICE — ARM SLING: Brand: DEROYAL